# Patient Record
Sex: MALE | Race: WHITE | HISPANIC OR LATINO | Employment: PART TIME | URBAN - METROPOLITAN AREA
[De-identification: names, ages, dates, MRNs, and addresses within clinical notes are randomized per-mention and may not be internally consistent; named-entity substitution may affect disease eponyms.]

---

## 2018-09-30 ENCOUNTER — HOSPITAL ENCOUNTER (INPATIENT)
Facility: HOSPITAL | Age: 43
LOS: 9 days | Discharge: HOME | DRG: 753 | End: 2018-10-09
Attending: PSYCHIATRY & NEUROLOGY | Admitting: PSYCHIATRY & NEUROLOGY
Payer: COMMERCIAL

## 2018-09-30 ENCOUNTER — HOSPITAL ENCOUNTER (EMERGENCY)
Facility: HOSPITAL | Age: 43
End: 2018-09-30
Attending: EMERGENCY MEDICINE
Payer: COMMERCIAL

## 2018-09-30 VITALS
DIASTOLIC BLOOD PRESSURE: 79 MMHG | SYSTOLIC BLOOD PRESSURE: 120 MMHG | HEART RATE: 64 BPM | OXYGEN SATURATION: 98 % | RESPIRATION RATE: 16 BRPM | TEMPERATURE: 97.7 F

## 2018-09-30 DIAGNOSIS — F10.10 ALCOHOL ABUSE, CONTINUOUS: Chronic | ICD-10-CM

## 2018-09-30 DIAGNOSIS — J30.89 SEASONAL ALLERGIC RHINITIS DUE TO OTHER ALLERGIC TRIGGER: ICD-10-CM

## 2018-09-30 DIAGNOSIS — B35.9 TINEA: ICD-10-CM

## 2018-09-30 DIAGNOSIS — Z00.8 MEDICAL CLEARANCE FOR PSYCHIATRIC ADMISSION: Primary | ICD-10-CM

## 2018-09-30 DIAGNOSIS — F29 PSYCHOSIS (HCC): ICD-10-CM

## 2018-09-30 DIAGNOSIS — F12.20 CANNABIS DEPENDENCE, CONTINUOUS (HCC): Chronic | ICD-10-CM

## 2018-09-30 DIAGNOSIS — F31.2 BIPOLAR I DISORDER, MOST RECENT EPISODE MANIC, SEVERE WITH PSYCHOTIC FEATURES (HCC): Primary | Chronic | ICD-10-CM

## 2018-09-30 DIAGNOSIS — Z72.0 TOBACCO USE: ICD-10-CM

## 2018-09-30 LAB
AMPHETAMINES SERPL QL SCN: NEGATIVE
BARBITURATES UR QL: NEGATIVE
BENZODIAZ UR QL: NEGATIVE
COCAINE UR QL: NEGATIVE
ETHANOL EXG-MCNC: 0 MG/DL
METHADONE UR QL: NEGATIVE
OPIATES UR QL SCN: NEGATIVE
PCP UR QL: NEGATIVE
THC UR QL: POSITIVE

## 2018-09-30 PROCEDURE — 82075 ASSAY OF BREATH ETHANOL: CPT | Performed by: EMERGENCY MEDICINE

## 2018-09-30 PROCEDURE — 99285 EMERGENCY DEPT VISIT HI MDM: CPT

## 2018-09-30 PROCEDURE — 80307 DRUG TEST PRSMV CHEM ANLYZR: CPT | Performed by: EMERGENCY MEDICINE

## 2018-09-30 PROCEDURE — 96372 THER/PROPH/DIAG INJ SC/IM: CPT

## 2018-09-30 RX ORDER — HYDROXYZINE HYDROCHLORIDE 25 MG/1
25 TABLET, FILM COATED ORAL EVERY 6 HOURS PRN
Status: DISCONTINUED | OUTPATIENT
Start: 2018-09-30 | End: 2018-10-09 | Stop reason: HOSPADM

## 2018-09-30 RX ORDER — ACETAMINOPHEN 325 MG/1
650 TABLET ORAL EVERY 6 HOURS PRN
Status: CANCELLED | OUTPATIENT
Start: 2018-09-30

## 2018-09-30 RX ORDER — ACETAMINOPHEN 325 MG/1
975 TABLET ORAL EVERY 6 HOURS PRN
Status: CANCELLED | OUTPATIENT
Start: 2018-09-30

## 2018-09-30 RX ORDER — LORAZEPAM 1 MG/1
1 TABLET ORAL EVERY 6 HOURS PRN
Status: CANCELLED | OUTPATIENT
Start: 2018-09-30

## 2018-09-30 RX ORDER — HALOPERIDOL 5 MG/ML
10 INJECTION INTRAMUSCULAR ONCE
Status: COMPLETED | OUTPATIENT
Start: 2018-09-30 | End: 2018-09-30

## 2018-09-30 RX ORDER — BENZTROPINE MESYLATE 0.5 MG/1
1 TABLET ORAL
Status: CANCELLED | OUTPATIENT
Start: 2018-09-30

## 2018-09-30 RX ORDER — HALOPERIDOL 5 MG/ML
5 INJECTION INTRAMUSCULAR EVERY 6 HOURS PRN
Status: CANCELLED | OUTPATIENT
Start: 2018-09-30

## 2018-09-30 RX ORDER — BENZTROPINE MESYLATE 1 MG/ML
1 INJECTION INTRAMUSCULAR; INTRAVENOUS
Status: CANCELLED | OUTPATIENT
Start: 2018-09-30

## 2018-09-30 RX ORDER — TRAZODONE HYDROCHLORIDE 50 MG/1
50 TABLET ORAL
Status: DISCONTINUED | OUTPATIENT
Start: 2018-09-30 | End: 2018-10-09 | Stop reason: HOSPADM

## 2018-09-30 RX ORDER — BENZTROPINE MESYLATE 1 MG/ML
2 INJECTION INTRAMUSCULAR; INTRAVENOUS EVERY 6 HOURS PRN
Status: DISCONTINUED | OUTPATIENT
Start: 2018-09-30 | End: 2018-10-01

## 2018-09-30 RX ORDER — BENZTROPINE MESYLATE 1 MG/1
2 TABLET ORAL EVERY 6 HOURS PRN
Status: DISCONTINUED | OUTPATIENT
Start: 2018-09-30 | End: 2018-10-01

## 2018-09-30 RX ORDER — MAGNESIUM HYDROXIDE/ALUMINUM HYDROXICE/SIMETHICONE 120; 1200; 1200 MG/30ML; MG/30ML; MG/30ML
15 SUSPENSION ORAL EVERY 4 HOURS PRN
Status: DISCONTINUED | OUTPATIENT
Start: 2018-09-30 | End: 2018-10-09 | Stop reason: HOSPADM

## 2018-09-30 RX ORDER — LORAZEPAM 2 MG/ML
INJECTION INTRAMUSCULAR
Status: COMPLETED
Start: 2018-09-30 | End: 2018-09-30

## 2018-09-30 RX ORDER — RISPERIDONE 1 MG/1
1 TABLET, ORALLY DISINTEGRATING ORAL
Status: CANCELLED | OUTPATIENT
Start: 2018-09-30

## 2018-09-30 RX ORDER — ZIPRASIDONE MESYLATE 20 MG/ML
20 INJECTION, POWDER, LYOPHILIZED, FOR SOLUTION INTRAMUSCULAR EVERY 4 HOURS PRN
Status: CANCELLED | OUTPATIENT
Start: 2018-09-30

## 2018-09-30 RX ORDER — MAGNESIUM HYDROXIDE/ALUMINUM HYDROXICE/SIMETHICONE 120; 1200; 1200 MG/30ML; MG/30ML; MG/30ML
30 SUSPENSION ORAL EVERY 4 HOURS PRN
Status: CANCELLED | OUTPATIENT
Start: 2018-09-30

## 2018-09-30 RX ORDER — ACETAMINOPHEN 325 MG/1
650 TABLET ORAL EVERY 4 HOURS PRN
Status: CANCELLED | OUTPATIENT
Start: 2018-09-30

## 2018-09-30 RX ORDER — HALOPERIDOL 5 MG
5 TABLET ORAL EVERY 6 HOURS PRN
Status: CANCELLED | OUTPATIENT
Start: 2018-09-30

## 2018-09-30 RX ORDER — ACETAMINOPHEN 325 MG/1
325 TABLET ORAL EVERY 6 HOURS PRN
Status: DISCONTINUED | OUTPATIENT
Start: 2018-09-30 | End: 2018-10-09 | Stop reason: HOSPADM

## 2018-09-30 RX ADMIN — LORAZEPAM 2 MG: 2 INJECTION INTRAMUSCULAR; INTRAVENOUS at 10:20

## 2018-09-30 RX ADMIN — HALOPERIDOL LACTATE 10 MG: 5 INJECTION, SOLUTION INTRAMUSCULAR at 10:20
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
## 2018-09-30 NOTE — ED NOTES
Insurance Authorization:   Phone call placed to [facility] number: [phone]     Spoke to Judd Chang    3 days approved    Level of care: Inpatient psychiatry  Accepting facility to call and obtain authorization

## 2018-09-30 NOTE — ED NOTES
Patient aware they have been involuntarily committed for inpatient psychiatric treatment  Patient aware of their rights, form provided       302 and Act 77 faxed to [facility] [phone] faxed to Stewart Knowles crisis worker, who will present to [facility]

## 2018-09-30 NOTE — ED NOTES
Patient urinated on self  Nurse, Tech and Security in to clean up and re adjust restraints  Offered food and drink  Changed patient around in fresh scrubs and linens       Lexx Maria  09/30/18 7234

## 2018-09-30 NOTE — EMTALA/ACUTE CARE TRANSFER
[address] [address] [address]  Dept: [phone]      EMTALA TRANSFER CONSENT    NAME Marilyn Route                                         1975                              MRN [id number]    I have been informed of my rights regarding examination, treatment, and transfer   by Dr Live Sales MD    Benefits: Specialized equipment and/or services available at the receiving facility (Include comment)________________________    Risks: Potential for delay in receiving treatment, Potential deterioration of medical condition, Increased discomfort during transfer, Possible worsening of condition or death during transfer      Consent for Transfer:  I acknowledge that my medical condition has been evaluated and explained to me by the emergency department physician or other qualified medical person and/or my attending physician, who has recommended that I be transferred to the service of  Accepting Physician: Dr Paramjit Manning at [address] [place] [address] Name, [facility] : [address]  The above potential benefits of such transfer, the potential risks associated with such transfer, and the probable risks of not being transferred have been explained to me, and I fully understand them  The doctor has explained that, in my case, the benefits of transfer outweigh the risks  I agree to be transferred  I authorize the performance of emergency medical procedures and treatments upon me in both transit and upon arrival at the receiving facility  Additionally, I authorize the release of any and all medical records to the receiving facility and request they be transported with me, if possible  I understand that the safest mode of transportation during a medical emergency is an ambulance and that the Hospital advocates the use of this mode of transport   Risks of traveling to the receiving facility by car, including absence of medical control, life sustaining equipment, such as oxygen, and medical personnel has been explained to me and I fully understand them  (YESSI CORRECT BOX BELOW)  [  ]  I consent to the stated transfer and to be transported by ambulance/helicopter  [  ]  I consent to the stated transfer, but refuse transportation by ambulance and accept full responsibility for my transportation by car  I understand the risks of non-ambulance transfers and I exonerate the Hospital and its staff from any deterioration in my condition that results from this refusal     X___________________________________________    DATE  18  TIME________  Signature of patient or legally responsible individual signing on patient behalf           RELATIONSHIP TO PATIENT_________________________          Provider Certification    NAME Garett Vivas                                         1975                              MRN [id number]    A medical screening exam was performed on the above named patient  Based on the examination:    Condition Necessitating Transfer The primary encounter diagnosis was Medical clearance for psychiatric admission  A diagnosis of Psychosis ([facility] [address] ) was also pertinent to this visit      Patient Condition: The patient has been stabilized such that within reasonable medical probability, no material deterioration of the patient condition or the condition of the unborn child(rey) is likely to result from the transfer    Reason for Transfer: Level of Care needed not available at this facility    Transfer Requirements: [address]   · Space available and qualified personnel available for treatment as acknowledged by    · Agreed to accept transfer and to provide appropriate medical treatment as acknowledged by       Dr Piero Rayo  · Appropriate medical records of the examination and treatment of the patient are provided at the time of transfer   [address] _______  · Transfer will be performed by qualified personnel from and appropriate transfer equipment as required, including the use of necessary and appropriate life support measures  Provider Certification: I have examined the patient and explained the following risks and benefits of being transferred/refusing transfer to the patient/family:  General risk, such as traffic hazards, adverse weather conditions, rough terrain or turbulence, possible failure of equipment (including vehicle or aircraft), or consequences of actions of persons outside the control of the transport personnel      Based on these reasonable risks and benefits to the patient and/or the unborn child(rey), and based upon the information available at the time of the patients examination, I certify that the medical benefits reasonably to be expected from the provision of appropriate medical treatments at another medical facility outweigh the increasing risks, if any, to the individuals medical condition, and in the case of labor to the unborn child, from effecting the transfer      X____________________________________________ DATE 09/30/18        TIME_______      ORIGINAL - SEND TO MEDICAL RECORDS   COPY - SEND WITH PATIENT DURING TRANSFER

## 2018-09-30 NOTE — ED NOTES
Called control team due to patient irate and threat of elopement  Patient placed in 4 point restraints       Candance Breaker A Pasquali  09/30/18 1021

## 2018-09-30 NOTE — ED NOTES
Chief Complaint   Patient presents with   [employer] Psychiatric Evaluation     pt was brought in by ems for inappropriate behavior  per ems police were called multiple times to the residence, he was out in the street saying inappropriate things  per family, pt has a hx of "mental illness" and has been 302 in past; with reoccurrence of symptoms yearly  pt doesn't take previsously prescribed medications  only "self-medicates with marijuana"  his roomate reported agressive behavior and constant burping  brother reports pt has been awake for 4 days     Crisis worker completed Behavioral Health Evaluation and Risk Assessment for 42 y/o male whom presents to ED by [facility] due to being found wondering the street, acting erratic, bizarre, and constantly looking up into the payam  A neighbor called 911 due to patient's behavior  Patient presents agitated, with tangential speech, and appears to be responding to internal stimuli  Patient very paranoid and reports he wants to see his  mother  Patient reports his mom calls his names  Patient denies hallucinations  Patient denies Suicidal ideations and homicidal ideations  Brother reports patient has not slept in four days  Patient reports being involuntarily psychiatrically hospitalized in the past and is self-medicating daily with marijuana  Family reports history of mental illness and that he acts erratic and bizarre this time of year  They  feel he not taking medications and are worried for his safety   Patient denies he needs any treatment and does not wish to sign a 201

## 2018-09-30 NOTE — ED PROVIDER NOTES
68-year-old male brought in by paramedics, needed to be medicated by primary ER provider , I was consult for 302  I saw and examined the patient  , patient very aggressive upon entry to emergency department appeared to be a danger to self and others  patient very tangential, poor to no insight into his illness  , unsure why he is in the emergency department  , seems to be responding to external stimuli , frequently throughout the conversation will keep making references to an ex girlfriend's father and how he owes him money  Patient admits to not taking any medications stating that he does not need them, supplements medications for illegally smoking marijuana  I do recommend patient will require inpatient psychiatric treatment and observation    Therefore will up hold [address],   09/30/18 6483

## 2018-09-30 NOTE — ED NOTES
Patient is accepted at [facility]  Patient is accepted by Dr Audra North per Anne Cuello 32 is arranged with [facility]  Transportation is scheduled for 7:30 pm  Patient may go to the floor at upon arrival        Nurse report is to be called to [phone] prior to patient transfer

## 2018-09-30 NOTE — ED NOTES
Nurse in with security to undue 2 restraints so patient can urinate and stretch  All 4 put back on when finished       Denney Goltz  09/30/18 3309

## 2018-09-30 NOTE — ED NOTES
Pt able to give urine specimen, gave some verbal resistance to being placed back in 4 point locked restraints, but then agreeable  Explained to pt that restraints could begin to be removed if he continued to be calm and cooperative  Pt remains under continuous obs       Shawn Hidalgo RN  09/30/18 1893

## 2018-10-01 PROBLEM — F10.10 ALCOHOL ABUSE, CONTINUOUS: Chronic | Status: ACTIVE | Noted: 2018-10-01

## 2018-10-01 PROBLEM — F31.81 BIPOLAR 2 DISORDER, MAJOR DEPRESSIVE EPISODE (HCC): Chronic | Status: ACTIVE | Noted: 2018-10-01

## 2018-10-01 PROBLEM — F31.2 BIPOLAR I DISORDER, MOST RECENT EPISODE MANIC, SEVERE WITH PSYCHOTIC FEATURES (HCC): Status: ACTIVE | Noted: 2018-10-01

## 2018-10-01 PROBLEM — F12.20 CANNABIS DEPENDENCE, CONTINUOUS (HCC): Chronic | Status: ACTIVE | Noted: 2018-10-01

## 2018-10-01 PROBLEM — F31.2 BIPOLAR I DISORDER, MOST RECENT EPISODE MANIC, SEVERE WITH PSYCHOTIC FEATURES (HCC): Chronic | Status: ACTIVE | Noted: 2018-10-01

## 2018-10-01 PROCEDURE — 99222 1ST HOSP IP/OBS MODERATE 55: CPT | Performed by: PSYCHIATRY & NEUROLOGY

## 2018-10-01 RX ORDER — RISPERIDONE 1 MG/1
2 TABLET, ORALLY DISINTEGRATING ORAL EVERY 4 HOURS PRN
Status: DISCONTINUED | OUTPATIENT
Start: 2018-10-01 | End: 2018-10-09 | Stop reason: HOSPADM

## 2018-10-01 RX ORDER — OLANZAPINE 10 MG/1
10 INJECTION, POWDER, LYOPHILIZED, FOR SOLUTION INTRAMUSCULAR
Status: DISCONTINUED | OUTPATIENT
Start: 2018-10-01 | End: 2018-10-09 | Stop reason: HOSPADM

## 2018-10-01 RX ORDER — LORAZEPAM 2 MG/ML
1 INJECTION INTRAMUSCULAR EVERY 4 HOURS PRN
Status: DISCONTINUED | OUTPATIENT
Start: 2018-10-01 | End: 2018-10-09 | Stop reason: HOSPADM

## 2018-10-01 RX ORDER — ACETAMINOPHEN 325 MG/1
650 TABLET ORAL EVERY 4 HOURS PRN
Status: DISCONTINUED | OUTPATIENT
Start: 2018-10-01 | End: 2018-10-01

## 2018-10-01 RX ORDER — NICOTINE 21 MG/24HR
21 PATCH, TRANSDERMAL 24 HOURS TRANSDERMAL DAILY
Status: DISCONTINUED | OUTPATIENT
Start: 2018-10-01 | End: 2018-10-09 | Stop reason: HOSPADM

## 2018-10-01 RX ORDER — BENZTROPINE MESYLATE 1 MG/1
2 TABLET ORAL EVERY 6 HOURS PRN
Status: DISCONTINUED | OUTPATIENT
Start: 2018-10-01 | End: 2018-10-09 | Stop reason: HOSPADM

## 2018-10-01 RX ORDER — LORAZEPAM 1 MG/1
1 TABLET ORAL 3 TIMES DAILY
Status: COMPLETED | OUTPATIENT
Start: 2018-10-01 | End: 2018-10-02

## 2018-10-01 RX ORDER — ACETAMINOPHEN 325 MG/1
650 TABLET ORAL EVERY 6 HOURS PRN
Status: DISCONTINUED | OUTPATIENT
Start: 2018-10-01 | End: 2018-10-01

## 2018-10-01 RX ORDER — RISPERIDONE 1 MG/1
1 TABLET, ORALLY DISINTEGRATING ORAL 2 TIMES DAILY
Status: DISCONTINUED | OUTPATIENT
Start: 2018-10-01 | End: 2018-10-02

## 2018-10-01 RX ORDER — FOLIC ACID 1 MG/1
1 TABLET ORAL DAILY
Status: DISCONTINUED | OUTPATIENT
Start: 2018-10-01 | End: 2018-10-09 | Stop reason: HOSPADM

## 2018-10-01 RX ORDER — ACETAMINOPHEN 325 MG/1
650 TABLET ORAL EVERY 6 HOURS PRN
Status: DISCONTINUED | OUTPATIENT
Start: 2018-10-01 | End: 2018-10-09 | Stop reason: HOSPADM

## 2018-10-01 RX ORDER — THIAMINE MONONITRATE (VIT B1) 100 MG
100 TABLET ORAL DAILY
Status: DISCONTINUED | OUTPATIENT
Start: 2018-10-01 | End: 2018-10-09 | Stop reason: HOSPADM

## 2018-10-01 RX ORDER — HALOPERIDOL 5 MG
5 TABLET ORAL EVERY 6 HOURS PRN
Status: DISCONTINUED | OUTPATIENT
Start: 2018-10-01 | End: 2018-10-09 | Stop reason: HOSPADM

## 2018-10-01 RX ORDER — HALOPERIDOL 5 MG/ML
5 INJECTION INTRAMUSCULAR EVERY 6 HOURS PRN
Status: DISCONTINUED | OUTPATIENT
Start: 2018-10-01 | End: 2018-10-09 | Stop reason: HOSPADM

## 2018-10-01 RX ORDER — ACETAMINOPHEN 325 MG/1
650 TABLET ORAL EVERY 8 HOURS PRN
Status: DISCONTINUED | OUTPATIENT
Start: 2018-10-01 | End: 2018-10-09 | Stop reason: HOSPADM

## 2018-10-01 RX ORDER — BENZTROPINE MESYLATE 1 MG/ML
2 INJECTION INTRAMUSCULAR; INTRAVENOUS EVERY 6 HOURS PRN
Status: DISCONTINUED | OUTPATIENT
Start: 2018-10-01 | End: 2018-10-09 | Stop reason: HOSPADM

## 2018-10-01 RX ADMIN — THIAMINE HCL TAB 100 MG 100 MG: 100 TAB at 12:22

## 2018-10-01 RX ADMIN — LORAZEPAM 1 MG: 1 TABLET ORAL at 16:43

## 2018-10-01 RX ADMIN — Medication 1 TABLET: at 12:22

## 2018-10-01 RX ADMIN — LORAZEPAM 1 MG: 1 TABLET ORAL at 12:21

## 2018-10-01 RX ADMIN — NICOTINE 21 MG: 21 PATCH, EXTENDED RELEASE TRANSDERMAL at 13:37

## 2018-10-01 RX ADMIN — RISPERIDONE 1 MG: 1 TABLET, ORALLY DISINTEGRATING ORAL at 12:22

## 2018-10-01 RX ADMIN — RISPERIDONE 1 MG: 1 TABLET, ORALLY DISINTEGRATING ORAL at 18:03

## 2018-10-01 RX ADMIN — FOLIC ACID 1 MG: 1 TABLET ORAL at 12:22
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
## 2018-10-01 NOTE — TREATMENT PLAN
TREATMENT PLAN REVIEW - [address] 41 y o  1975 male MRN: [id number]    [address] [place] [address] Room / Bed: [place] [address] [id number] Encounter: [id number]        Admit Date/Time:  9/30/2018  9:20 PM    Treatment Team: Attending Provider: Myrene Fothergill, MD; Consulting Physician: Marvin Landers DO; Occupational Therapy Assistant: Simona Gillis, [address] [place] [address]; Registered Nurse: Loyd Li RN; Patient Care Technician: Ashley Montes De Oca;  : Joseph Moon; Patient Care Technician: Magdaline Heimlich; Registered Nurse: Luis E Andrews RN    Diagnosis: Principal Problem:    Bipolar I disorder, most recent episode manic, severe with psychotic features ([facility] [address] )  Active Problems:    Alcohol abuse, continuous    Cannabis dependence, continuous ([facility] [address] )    Patient Strengths/Assets: negotiates basic needs, stable housing, supportive family      Patient Barriers/Limitations: patient is on an involuntary commitment, poor insight, poor past treatment response, poor reasoning ability, poor self-care, resistance to treatment    Short Term Goals: decrease in manic symptoms, decrease in psychotic symptoms, improvement in reality testing, improvement in reasoning ability, acceptance of need for psychiatric treatment, acceptance of psychiatric medications    Long Term Goals: resolution of manic symptoms, stabilization of mood, resolution of psychotic symptoms, improved reasoning ability, acceptance of need for psychiatric medications    Progress Towards Goals: starting psychiatric medications as prescribed, starting alcohol detoxification protocol    Recommended Treatment: medication management, patient medication education, group therapy, milieu therapy, continued Behavioral Health psychiatric evaluation/assessment process     Treatment Frequency: daily medication monitoring, group and milieu therapy daily, monitoring through interdisciplinary rounds, monitoring through weekly patient care conferences    Expected Discharge Date: 15 days - 10/16/2018    Discharge Plan: referral for outpatient medication management with a psychiatrist, referral for outpatient psychotherapy, return to previous living arrangement    Treatment Plan Created/Updated By: Justine Roberts MD
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
## 2018-10-01 NOTE — PROGRESS NOTES
Pt is out in the milieu  Denies all symptoms  Pt doesn't feel that he needs to be here  Pt is superficial and guarded during interaction  Pt doesn't want to take any medication  States he doesn't even take "Tylenol" when he has a headache  When asked what he does for a headache, pt states takes a "kelsi snack" and laughs
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
## 2018-10-01 NOTE — PROGRESS NOTES
Pt was reluctant to take medications  +Paranoia  Pt questing if the pills really what   I was saying they were  Pt inspected packages and looked at pills  Pt did take the medication  States that this always happens when he ends up in places like this  States that his brothers try to 36 him because they are "jealous" of him

## 2018-10-01 NOTE — PLAN OF CARE
Problem: PSYCHOSIS  Goal: Will report no hallucinations or delusions  Interventions:  - Administer medication as  ordered  - Every waking shifts and PRN assess for the presence of hallucinations and or delusions  - Assist with reality testing to support increasing orientation  - Assess if patient's hallucinations or delusions are encouraging self-harm or harm to others and intervene as appropriate   Outcome: Progressing      Problem: SAFETY, RESTRAINT - VIOLENT/SELF-DESTRUCTIVE  Goal: Remains free of harm/injury from restraints (Restraint for Violent/Self-Destructive Behavior)  INTERVENTIONS:  - Instruct patient/family regarding restraint use   - Assess and monitor physiologic and psychological status   - Provide interventions and comfort measures to meet assessed patient needs   - Ensure continuous in person monitoring is provided   - Identify and implement measures to help patient regain control  - Assess readiness for release of restraint   Outcome: Progressing    Goal: Returns to optimal restraint-free functioning  INTERVENTIONS:  - Assess the patient's behavior and symptoms that indicate continued need for restraint  - Identify and implement measures to help patient regain control  - Assess readiness for release of restraint    Outcome: Progressing
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
## 2018-10-02 LAB
ALBUMIN SERPL BCP-MCNC: 4.2 G/DL (ref 3.5–5)
ALP SERPL-CCNC: 75 U/L (ref 46–116)
ALT SERPL W P-5'-P-CCNC: 35 U/L (ref 12–78)
ANION GAP SERPL CALCULATED.3IONS-SCNC: 7 MMOL/L (ref 4–13)
AST SERPL W P-5'-P-CCNC: 39 U/L (ref 5–45)
BASOPHILS # BLD AUTO: 0.04 THOUSANDS/ΜL (ref 0–0.1)
BASOPHILS NFR BLD AUTO: 0 % (ref 0–1)
BILIRUB SERPL-MCNC: 1.16 MG/DL (ref 0.2–1)
BUN SERPL-MCNC: 11 MG/DL (ref 5–25)
CALCIUM SERPL-MCNC: 9.2 MG/DL (ref 8.3–10.1)
CHLORIDE SERPL-SCNC: 105 MMOL/L (ref 100–108)
CHOLEST SERPL-MCNC: 117 MG/DL (ref 50–200)
CO2 SERPL-SCNC: 28 MMOL/L (ref 21–32)
CREAT SERPL-MCNC: 0.85 MG/DL (ref 0.6–1.3)
EOSINOPHIL # BLD AUTO: 0.19 THOUSAND/ΜL (ref 0–0.61)
EOSINOPHIL NFR BLD AUTO: 2 % (ref 0–6)
ERYTHROCYTE [DISTWIDTH] IN BLOOD BY AUTOMATED COUNT: 13.1 % (ref 11.6–15.1)
GFR SERPL CREATININE-BSD FRML MDRD: 108 ML/MIN/1.73SQ M
GLUCOSE P FAST SERPL-MCNC: 97 MG/DL (ref 65–99)
GLUCOSE SERPL-MCNC: 97 MG/DL (ref 65–140)
HCT VFR BLD AUTO: 51 % (ref 36.5–49.3)
HDLC SERPL-MCNC: 43 MG/DL (ref 40–60)
HGB BLD-MCNC: 17.1 G/DL (ref 12–17)
IMM GRANULOCYTES # BLD AUTO: 0.03 THOUSAND/UL (ref 0–0.2)
IMM GRANULOCYTES NFR BLD AUTO: 0 % (ref 0–2)
LDLC SERPL CALC-MCNC: 61 MG/DL (ref 0–100)
LYMPHOCYTES # BLD AUTO: 3.44 THOUSANDS/ΜL (ref 0.6–4.47)
LYMPHOCYTES NFR BLD AUTO: 28 % (ref 14–44)
MCH RBC QN AUTO: 31.7 PG (ref 26.8–34.3)
MCHC RBC AUTO-ENTMCNC: 33.5 G/DL (ref 31.4–37.4)
MCV RBC AUTO: 95 FL (ref 82–98)
MONOCYTES # BLD AUTO: 1.09 THOUSAND/ΜL (ref 0.17–1.22)
MONOCYTES NFR BLD AUTO: 9 % (ref 4–12)
NEUTROPHILS # BLD AUTO: 7.72 THOUSANDS/ΜL (ref 1.85–7.62)
NEUTS SEG NFR BLD AUTO: 61 % (ref 43–75)
NONHDLC SERPL-MCNC: 74 MG/DL
NRBC BLD AUTO-RTO: 0 /100 WBCS
PLATELET # BLD AUTO: 295 THOUSANDS/UL (ref 149–390)
PMV BLD AUTO: 9.9 FL (ref 8.9–12.7)
POTASSIUM SERPL-SCNC: 4.2 MMOL/L (ref 3.5–5.3)
PROT SERPL-MCNC: 7.9 G/DL (ref 6.4–8.2)
RBC # BLD AUTO: 5.39 MILLION/UL (ref 3.88–5.62)
RPR SER QL: NORMAL
SODIUM SERPL-SCNC: 140 MMOL/L (ref 136–145)
TRIGL SERPL-MCNC: 63 MG/DL
TSH SERPL DL<=0.05 MIU/L-ACNC: 1.45 UIU/ML (ref 0.36–3.74)
WBC # BLD AUTO: 12.51 THOUSAND/UL (ref 4.31–10.16)

## 2018-10-02 PROCEDURE — 84443 ASSAY THYROID STIM HORMONE: CPT | Performed by: PSYCHIATRY & NEUROLOGY

## 2018-10-02 PROCEDURE — 86592 SYPHILIS TEST NON-TREP QUAL: CPT | Performed by: PSYCHIATRY & NEUROLOGY

## 2018-10-02 PROCEDURE — 99232 SBSQ HOSP IP/OBS MODERATE 35: CPT | Performed by: PSYCHIATRY & NEUROLOGY

## 2018-10-02 PROCEDURE — 80053 COMPREHEN METABOLIC PANEL: CPT | Performed by: PSYCHIATRY & NEUROLOGY

## 2018-10-02 PROCEDURE — 85025 COMPLETE CBC W/AUTO DIFF WBC: CPT | Performed by: PSYCHIATRY & NEUROLOGY

## 2018-10-02 PROCEDURE — 80061 LIPID PANEL: CPT | Performed by: PSYCHIATRY & NEUROLOGY

## 2018-10-02 RX ORDER — RISPERIDONE 1 MG/1
1 TABLET, ORALLY DISINTEGRATING ORAL DAILY
Status: DISCONTINUED | OUTPATIENT
Start: 2018-10-03 | End: 2018-10-04

## 2018-10-02 RX ORDER — LORAZEPAM 0.5 MG/1
0.5 TABLET ORAL 3 TIMES DAILY
Status: COMPLETED | OUTPATIENT
Start: 2018-10-02 | End: 2018-10-03

## 2018-10-02 RX ORDER — CARBAMAZEPINE 200 MG/1
200 TABLET ORAL 2 TIMES DAILY
Status: DISCONTINUED | OUTPATIENT
Start: 2018-10-02 | End: 2018-10-05

## 2018-10-02 RX ORDER — RISPERIDONE 1 MG/1
2 TABLET, ORALLY DISINTEGRATING ORAL EVERY EVENING
Status: DISCONTINUED | OUTPATIENT
Start: 2018-10-02 | End: 2018-10-09 | Stop reason: HOSPADM

## 2018-10-02 RX ADMIN — LORAZEPAM 1 MG: 1 TABLET ORAL at 17:06

## 2018-10-02 RX ADMIN — NICOTINE 21 MG: 21 PATCH, EXTENDED RELEASE TRANSDERMAL at 08:22

## 2018-10-02 RX ADMIN — LORAZEPAM 1 MG: 1 TABLET ORAL at 00:08

## 2018-10-02 RX ADMIN — THIAMINE HCL TAB 100 MG 100 MG: 100 TAB at 08:22

## 2018-10-02 RX ADMIN — RISPERIDONE 1 MG: 1 TABLET, ORALLY DISINTEGRATING ORAL at 08:22

## 2018-10-02 RX ADMIN — RISPERIDONE 2 MG: 1 TABLET, ORALLY DISINTEGRATING ORAL at 17:06

## 2018-10-02 RX ADMIN — FOLIC ACID 1 MG: 1 TABLET ORAL at 08:22

## 2018-10-02 RX ADMIN — Medication 1 TABLET: at 08:22

## 2018-10-02 RX ADMIN — LORAZEPAM 1 MG: 1 TABLET ORAL at 08:22

## 2018-10-02 RX ADMIN — CARBAMAZEPINE 200 MG: 200 TABLET ORAL at 12:33

## 2018-10-02 RX ADMIN — CARBAMAZEPINE 200 MG: 200 TABLET ORAL at 17:06

## 2018-10-02 RX ADMIN — LORAZEPAM 0.5 MG: 0.5 TABLET ORAL at 22:18

## 2018-10-02 NOTE — PLAN OF CARE
Problem: PSYCHOSIS  Goal: Will report no hallucinations or delusions  Interventions:  - Administer medication as  ordered  - Every waking shifts and PRN assess for the presence of hallucinations and or delusions  - Assist with reality testing to support increasing orientation  - Assess if patient's hallucinations or delusions are encouraging self-harm or harm to others and intervene as appropriate   Outcome: Progressing      Problem: SAFETY, RESTRAINT - VIOLENT/SELF-DESTRUCTIVE  Goal: Remains free of harm/injury from restraints (Restraint for Violent/Self-Destructive Behavior)  INTERVENTIONS:  - Instruct patient/family regarding restraint use   - Assess and monitor physiologic and psychological status   - Provide interventions and comfort measures to meet assessed patient needs   - Ensure continuous in person monitoring is provided   - Identify and implement measures to help patient regain control  - Assess readiness for release of restraint   Outcome: Progressing    Goal: Returns to optimal restraint-free functioning  INTERVENTIONS:  - Assess the patient's behavior and symptoms that indicate continued need for restraint  - Identify and implement measures to help patient regain control  - Assess readiness for release of restraint    Outcome: Progressing      Problem: DISCHARGE PLANNING  Goal: Discharge to home or other facility with appropriate resources  INTERVENTIONS:  - Identify barriers to discharge w/patient and caregiver  - Arrange for needed discharge resources and transportation as appropriate  - Identify discharge learning needs (meds, wound care, etc )  - Refer to Case Management Department for coordinating discharge planning if the patient needs post-hospital services based on physician/advanced practitioner order or complex needs related to functional status, cognitive ability, or social support system    INTERVENTIONS:  - Identify barriers to discharge w/patient and caregiver  - Arrange for needed discharge resources and transportation as appropriate  - Identify discharge learning needs (meds, wound care, etc )  - Refer to Case Management Department for coordinating discharge planning if the patient needs post-hospital services based on physician/advanced practitioner order or complex needs related to functional status, cognitive ability, or social support system   Outcome: Progressing      Problem: Ineffective Coping  Goal: Participates in unit activities  Interventions:  - Provide therapeutic environment   - Provide required programming   - Redirect inappropriate behaviors    Outcome: Progressing
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
## 2018-10-02 NOTE — CASE MANAGEMENT
ASHU called Lamin [place] [facility], re: need for 303 hearing  Told ASHU to fax him the 303 petition, which ASHU did  He said hearing would be on 104  He'll call ASHU & advise of the hearing time

## 2018-10-02 NOTE — PROGRESS NOTES
Pt denies SI or any other symptoms  He believes it was a misunderstanding that brought him to the hospital  He denies any alcohol withdrawal symptoms  The patient has been very preoccupied and almost appeared paranoid regarding medication  He stated to RN, "I don't really like taking any meds but I will to get outta here " Pt also examined each individual med wrapper closely  When RN went to open packages, he stated "Wait, you're going too fast " Pt did take medications  He has been observed since in the hallway doing jumping jacks, pacing the halls and doing yoga  Will monitor

## 2018-10-02 NOTE — PROGRESS NOTES
Patient pacing back and forth in the hallway  Pt given prn ativan at 0010 and standing underneath lighting dissecting the pill  Pt inspected packages and read before taking pill  Will continue monitor

## 2018-10-02 NOTE — PROGRESS NOTES
Progress Note - Behavioral Health     Yusra Kennedy 41 y o  male MRN: [id number]   Unit/Bed#: UN5 561-98 Encounter: [id number]    Behavior over the last 24 hours: limited improvement  Thiago Patterson remains irritable, labile and psychotic  He is still religiously preoccupied, his speech remains pressured and tangential  Per staff report he has been very paranoid about his medications, "examining" and opening pill packages  Bizarre behavior observed today after the session as well: he was pacing the hallways, exercising, also still "burping" at times and talking to himself  Has been taking medications, but believes he does not need any treatment  Attends some groups      Sleep: slept off and on  Appetite: normal  Medication side effects: No   ROS: no complaints, denies any shortness of breath, chest pain or abdominal pain    Mental Status Evaluation:    Appearance:  disheveled   Behavior:  bizarre, limited eye contact, evasive   Speech:  pressured, tangential   Mood:  labile, irritable, manic   Affect:  labile   Thought Process:  disorganized, illogical   Associations: tangential associations   Thought Content:  persecutory and bizarre delusions   Perceptual Disturbances: denies auditory or visual hallucinations when asked, but appears distracted   Risk Potential: Suicidal ideation - None at present  Homicidal ideation - None  Potential for aggression - Yes, due to poor impulse control   Sensorium:  oriented to person, place and time/date   Memory:  recent and remote memory grossly intact   Consciousness:  alert and awake   Attention: poor concentration and poor attention span   Insight:  poor   Judgment: poor   Gait/Station: normal gait/station and normal balance   Motor Activity: no abnormal movements     Vital signs in last 24 hours:    Temp:  [97 6 °F (36 4 °C)] 97 6 °F (36 4 °C)  HR:  [62-97] 97  Resp:  [16] 16  BP: (142-171)/(81-86) 171/81    Laboratory results:  I have personally reviewed all pertinent laboratory/tests results  Most Recent Labs:   Lab Results   Component Value Date    WBC 12 51 (H) 10/02/2018    RBC 5 39 10/02/2018    HGB 17 1 (H) 10/02/2018    HCT 51 0 (H) 10/02/2018     10/02/2018    RDW 13 1 10/02/2018    NEUTROABS 7 72 (H) 10/02/2018     10/02/2018    K 4 2 10/02/2018     10/02/2018    CO2 28 10/02/2018    BUN 11 10/02/2018    CREATININE 0 85 10/02/2018    GLUC 97 10/02/2018    GLUF 97 10/02/2018    CALCIUM 9 2 10/02/2018    AST 39 10/02/2018    ALT 35 10/02/2018    ALKPHOS 75 10/02/2018    TP 7 9 10/02/2018    ALB 4 2 10/02/2018    TBILI 1 16 (H) 10/02/2018    CHOLESTEROL 117 10/02/2018    HDL 43 10/02/2018    TRIG 63 10/02/2018    LDLCALC 61 10/02/2018    [place] 74 10/02/2018    DXS7ANMNSSXD 1 450 10/02/2018       Progress Toward Goals: no significant improvement, still disorganized, still irritable, remains labile, insight remains poor, poor reality testing    Assessment/Plan   Principal Problem:    Bipolar I disorder, most recent episode manic, severe with psychotic features ([facility] )  Active Problems:    Alcohol abuse, continuous    Cannabis dependence, continuous ([facility] )    Recommended Treatment:     Planned medication and treatment changes: All current active medications have been reviewed  Encourage group therapy, milieu therapy and occupational therapy  Behavioral Health checks every 5 minutes  303 hearing this week    Increase Risperdal to 1 mg daily and 2 mg in the evening  Decrease Ativan to 0 5 mg tid and taper off gradually  Start Tegretol 200 mg bid for mood stabilization - he agrees now to try  Check Tegretol level, CBC and CMP in 3 days     Continue all other medications:    Current Facility-Administered Medications:  acetaminophen 325 mg Oral Q6H PRN Mayela Hand MD   acetaminophen 650 mg Oral Q8H PRN Eber Sebastian MD   acetaminophen 650 mg Oral Q6H PRN Eber Sebastian MD   aluminum-magnesium hydroxide-simethicone 15 mL Oral Q4H PRN Mark Morley Mouna Benitez MD   benztropine 2 mg Intramuscular Q6H PRN Sammy Fails, MD   benztropine 2 mg Oral Q6H PRN Sammy Fails, MD   carBAMazepine 200 mg Oral BID Sammy Fails, MD   folic acid 1 mg Oral Daily Sammy Fails, MD   haloperidol 5 mg Oral Q6H PRN Sammy Fails, MD   haloperidol lactate 5 mg Intramuscular Q6H PRN Sammy Fails, MD   hydrOXYzine HCL 25 mg Oral Q6H PRN Wil Broussard, MD   LORazepam 1 mg Intramuscular Q4H PRN Sammy Fails, MD   LORazepam 0 5 mg Oral TID Sammy Fails, MD   LORazepam 1 mg Oral TID Sammy Fails, MD   magnesium hydroxide 20 mL Oral Daily PRN Wil Broussard, MD   multivitamin-minerals 1 tablet Oral Daily Sammy Fails, MD   nicotine 21 mg Transdermal Daily Sammy Fails, MD   OLANZapine 10 mg Intramuscular Q3H PRN Sammy Fails, MD   [START ON 10/3/2018] risperiDONE 1 mg Oral Daily Sammy Fails, MD   risperiDONE 2 mg Oral Q4H PRN Sammy Fails, MD   risperiDONE 2 mg Oral QPM Sammy Fails, MD   thiamine 100 mg Oral Daily Sammy Fails, MD   traZODone 50 mg Oral HS PRN Wil Broussard MD       Risks / Benefits of Treatment:    Risks, benefits, and possible side effects of medications explained to patient  Patient has limited understanding of risks and benefits of treatment at this time, but agrees to take medications as prescribed  Counseling / Coordination of Care:    Patient's progress discussed with staff in treatment team meeting  Medications, treatment progress and treatment plan reviewed with patient  Medication changes discussed with patient      Lauren Livingston MD 10/02/18

## 2018-10-02 NOTE — PROGRESS NOTES
Pt was very apprehensive about taking the newly ordered Tegretol  RN explained the medication and what it was used for  Pt also requested to see the package of the medication again  He said, "Just because I am asking to see the packages doesn't mean anything  Wouldn't you want to know what you were taking?" Pt requested and will be given information on all the medications

## 2018-10-03 PROCEDURE — 99232 SBSQ HOSP IP/OBS MODERATE 35: CPT | Performed by: PSYCHIATRY & NEUROLOGY

## 2018-10-03 RX ORDER — LORAZEPAM 0.5 MG/1
0.5 TABLET ORAL 2 TIMES DAILY
Status: COMPLETED | OUTPATIENT
Start: 2018-10-04 | End: 2018-10-05

## 2018-10-03 RX ADMIN — RISPERIDONE 2 MG: 1 TABLET, ORALLY DISINTEGRATING ORAL at 17:42

## 2018-10-03 RX ADMIN — NICOTINE 21 MG: 21 PATCH, EXTENDED RELEASE TRANSDERMAL at 09:00

## 2018-10-03 RX ADMIN — Medication 1 TABLET: at 08:53

## 2018-10-03 RX ADMIN — THIAMINE HCL TAB 100 MG 100 MG: 100 TAB at 08:54

## 2018-10-03 RX ADMIN — LORAZEPAM 0.5 MG: 0.5 TABLET ORAL at 08:54

## 2018-10-03 RX ADMIN — FOLIC ACID 1 MG: 1 TABLET ORAL at 08:54

## 2018-10-03 RX ADMIN — LORAZEPAM 0.5 MG: 0.5 TABLET ORAL at 17:43

## 2018-10-03 RX ADMIN — LORAZEPAM 0.5 MG: 0.5 TABLET ORAL at 21:45

## 2018-10-03 RX ADMIN — CARBAMAZEPINE 200 MG: 200 TABLET ORAL at 08:54

## 2018-10-03 RX ADMIN — CARBAMAZEPINE 200 MG: 200 TABLET ORAL at 17:42

## 2018-10-03 RX ADMIN — RISPERIDONE 1 MG: 1 TABLET, ORALLY DISINTEGRATING ORAL at 08:54

## 2018-10-03 NOTE — PLAN OF CARE
Problem: PSYCHOSIS  Goal: Will report no hallucinations or delusions  Interventions:  - Administer medication as  ordered  - Every waking shifts and PRN assess for the presence of hallucinations and or delusions  - Assist with reality testing to support increasing orientation  - Assess if patient's hallucinations or delusions are encouraging self-harm or harm to others and intervene as appropriate   Outcome: Progressing      Problem: SAFETY, RESTRAINT - VIOLENT/SELF-DESTRUCTIVE  Goal: Remains free of harm/injury from restraints (Restraint for Violent/Self-Destructive Behavior)  INTERVENTIONS:  - Instruct patient/family regarding restraint use   - Assess and monitor physiologic and psychological status   - Provide interventions and comfort measures to meet assessed patient needs   - Ensure continuous in person monitoring is provided   - Identify and implement measures to help patient regain control  - Assess readiness for release of restraint   Outcome: Progressing    Goal: Returns to optimal restraint-free functioning  INTERVENTIONS:  - Assess the patient's behavior and symptoms that indicate continued need for restraint  - Identify and implement measures to help patient regain control  - Assess readiness for release of restraint    Outcome: Progressing      Problem: DISCHARGE PLANNING  Goal: Discharge to home or other facility with appropriate resources  INTERVENTIONS:  - Identify barriers to discharge w/patient and caregiver  - Arrange for needed discharge resources and transportation as appropriate  - Identify discharge learning needs (meds, wound care, etc )  - Refer to Case Management Department for coordinating discharge planning if the patient needs post-hospital services based on physician/advanced practitioner order or complex needs related to functional status, cognitive ability, or social support system    INTERVENTIONS:  - Identify barriers to discharge w/patient and caregiver  - Arrange for needed discharge resources and transportation as appropriate  - Identify discharge learning needs (meds, wound care, etc )  - Refer to Case Management Department for coordinating discharge planning if the patient needs post-hospital services based on physician/advanced practitioner order or complex needs related to functional status, cognitive ability, or social support system   Outcome: Not Progressing      Problem: Ineffective Coping  Goal: Participates in unit activities  Interventions:  - Provide therapeutic environment   - Provide required programming   - Redirect inappropriate behaviors    Outcome: Progressing

## 2018-10-03 NOTE — MEDICAL STUDENT
Progress Note - Behavioral Health   Gianna Vasquez 41 y o  male MRN: [id number]  Unit/Bed#: BZ3 502-03 Encounter: [id number]    Assessment/Plan   Principal Problem:    Bipolar I disorder, most recent episode manic, severe with psychotic features ([facility] )  Active Problems:    Alcohol abuse, continuous    Cannabis dependence, continuous ([facility] )    Plan:  1  Bipolar Disorder Type I, with psychotic features  -Patient is still manic on evaluation today and having paranoid delusions  -Continue risperidone at 1mg in the mornings and 2 mg evenings  -Continue carbamazepine 200 mg BID  -Encourage group therapy and milieu therapy    2  Alcohol abuse  -Last drink 4 days ago; patient has exhibited no withdrawal symptoms  -Continue Lorazepam 0 5 mg TID  -Continue folic acid, thiamine and vitamin supplements    Subjective:  Gisel Green reports no concerns today except repeatedly saying that he doesn't need to be here  He is very focused on discharge throughout the entire evaluation  He repeatedly describes his positive interactions with patients and how he helps out by calming them down  He also stated that he cleaned the group room when he woke up this morning at 4:45 am  It seems he may be trying to show us that he is ready for discharge through his behaviors  He also showed me the discharge page from his handbook and was asking me questions about it  He is taking his medications as scheduled and reports no adverse effects  He states that he believes the medications are helping him and that he is feeling better  He is still displaying paranoid behavior about his medication regimen, wanting to read the packages and verify that he is getting the correct dose and pill  He also has handouts on each of the medications he is receiving   I explained to Gisel Green the difference between the generic and the brand name of the medicine as he seemed to be confused about what he was taking since his handouts use the generic name but others have used the brand name when talking to him  Ralf Gauthier is additionally paranoid about people stealing from him  He states that he had his mother's phone number in a book he keeps on his nightstand and that this piece of paper is now missing  He also claims he was admitted with a "gold coin that had the statue of ralf on the front and a man who looks like Angeli Christy on the back" which has since gone missing and wasn't with his belongings when he got here  Behavior over the last 24 hours:  unchanged  Sleep: Slept from 6 pm to 11 am last night and then got up to take his medications and went back to bed until 4:45 am   Appetite: normal  Medication side effects: No  ROS: no complaints    Mental Status Evaluation:  Appearance:  age appropriate and casually dressed   Behavior:  cooperative, calmer but still irritable   Speech:  normal pitch and normal volume   Mood:  manic   Affect:  mood-congruent   Thought Process:  tangential   Thought Content:  delusions  paranoid   Perceptual Disturbances: None   Risk Potential: Suicidal Ideations none, Homicidal Ideations none and Potential for Aggression Yes patient is still manic and irritable but appears to get along with residents and staff   Sensorium:  person, place and time/date   Cognition:  grossly intact   Consciousness:  alert and awake    Attention: attention span and concentration were age appropriate   Insight:  limited   Judgment: limited   Gait/Station: normal gait/station and normal balance   Motor Activity: no abnormal movements     Progress Toward Goals: Patient continues to make no significant progress towards his goals due to poor insight into his condition  Recommended Treatment: Continue with group therapy, milieu therapy and occupational therapy  Risks, benefits and possible side effects of Medications:   Risks, benefits, and possible side effects of medications explained to patient and patient verbalizes understanding        Medications: all current active meds have been reviewed

## 2018-10-03 NOTE — PROGRESS NOTES
Patient denies all psychiatric symptoms but appears paranoid in our interaction  I scanned his morning medications and threw the packages away  Patient stated, "I would like to see the packages "  But they were already garbage  I took them out of accu-dose again and showed him the packages as I gave them  I also gave him hand-outs on Risperdal and Tegretol

## 2018-10-03 NOTE — PROGRESS NOTES
Progress Note - Behavioral Health     Marion Lee 41 y o  male MRN: [id number]   Unit/Bed#: TI0 319-21 Encounter: [id number]    Behavior over the last 24 hours: eliana Elizondo is still irritable and labile, still has rambling, pressured speech with flight of ideas  Remains paranoid and still preoccupied with medication names; still has been "inspecting" medication packages "I need to know what they give me"  Still has Congregational preoccupation and talks about God frequently during the session  Grandaurea, has been "telling other patients what to do, trying to calm them when they get upset  I have a biggest heart that exists"  States that he is proud that he woke up at 4 AM and was "cleaning the whole unit"  Bizarre behavior noted at times, he still makes loud burping noises, talks to self and sings to self frequently  Has been taking medications on the unit, but argues titration schedule and need for continuing medications  Attends some groups       Sleep: slept off and on, early awakening  Appetite: normal  Medication side effects: No   ROS: no complaints, denies any shortness of breath, chest pain or abdominal pain    Mental Status Evaluation:    Appearance:  disheveled, wearing hospital clothes   Behavior:  bizarre, evasive   Speech:  pressured, tangential   Mood:  labile, manic   Affect:  labile   Thought Process:  disorganized, illogical   Associations: tangential associations   Thought Content:  grandiose, paranoid and bizarre delusions, Congregational preoccupation   Perceptual Disturbances: denies auditory or visual hallucinations when asked, but appears preoccupied   Risk Potential: Suicidal ideation - None  Homicidal ideation - None  Potential for aggression - Not at present   Sensorium:  oriented to person, place and time/date   Memory:  recent and remote memory grossly intact   Consciousness:  alert and awake   Attention: poor concentration and poor attention span   Insight:  poor   Judgment: poor Gait/Station: normal gait/station and normal balance   Motor Activity: no abnormal movements     Vital signs in last 24 hours:    Temp:  [98 °F (36 7 °C)-98 2 °F (36 8 °C)] 98 2 °F (36 8 °C)  HR:  [] 87  Resp:  [16] 16  BP: (135-171)/(87-98) 140/98    Laboratory results:  I have personally reviewed all pertinent laboratory/tests results  RPR:   Lab Results   Component Value Date    RPR Non-Reactive 10/02/2018       Progress Toward Goals: no progress, remains disorganized, still labile, poor insight, still has psychotic symptoms    Assessment/Plan   Principal Problem:    Bipolar I disorder, most recent episode manic, severe with psychotic features ([facility] )  Active Problems:    Alcohol abuse, continuous    Cannabis dependence, continuous ([facility] )    Recommended Treatment:     Planned medication and treatment changes: All current active medications have been reviewed  Encourage group therapy, milieu therapy and occupational therapy  Behavioral Health checks every 5 minutes  303 hearing scheduled for tomorrow    Check Tegretol level, CBC and CMP in 2 days  Taper off ativan gradually  Titrate Risperdal    Continue all other medications:    Current Facility-Administered Medications:  acetaminophen 325 mg Oral Q6H PRN Bebeto Yanes MD   acetaminophen 650 mg Oral Q8H PRN Martin Berumen MD   acetaminophen 650 mg Oral Q6H PRN Mercarlos enriquel MD [place]   aluminum-magnesium hydroxide-simethicone 15 mL Oral Q4H PRN Bebeto Yanes MD   benztropine 2 mg Intramuscular Q6H PRN Austin Smith MD   benztropine 2 mg Oral Q6H PRN Austin Smith MD   carBAMazepine 200 mg Oral BID Austin Smith MD   folic acid 1 mg Oral Daily Austin Smith MD   haloperidol 5 mg Oral Q6H PRN Austin Smith MD   haloperidol lactate 5 mg Intramuscular Q6H PRN Austin Smith MD   hydrOXYzine HCL 25 mg Oral Q6H PRN Bebeto Yanes MD   LORazepam 1 mg Intramuscular Q4H PRN Austin Smith MD   LORazepam 0 5 mg Oral TID Lynnette Soliman MD   [START ON 10/4/2018] LORazepam 0 5 mg Oral BID Lynnette Soliman MD   magnesium hydroxide 20 mL Oral Daily PRN Rodríguez Johnson MD   multivitamin-minerals 1 tablet Oral Daily Lynnette Soliman MD   nicotine 21 mg Transdermal Daily Lynnette Soliman MD   OLANZapine 10 mg Intramuscular Q3H PRN Lynnette Soliman MD   risperiDONE 1 mg Oral Daily Lynnette Soliman MD   risperiDONE 2 mg Oral Q4H PRN Lynnette Soliman MD   risperiDONE 2 mg Oral QPM Lynnette Soliman MD   thiamine 100 mg Oral Daily Lynnette Soliman MD   traZODone 50 mg Oral HS PRN Rodríguez Johnson MD       Risks / Benefits of Treatment:    Risks, benefits, and possible side effects of medications explained to patient  Patient has limited understanding of risks and benefits of treatment at this time, but agrees to take medications as prescribed  Counseling / Coordination of Care:    Patient's progress discussed with staff in treatment team meeting  Medications, treatment progress and treatment plan reviewed with patient  Medication changes discussed with patient  Educated on importance of medication and treatment compliance      Yanet Houston MD 10/03/18

## 2018-10-03 NOTE — PROGRESS NOTES
It was reported to me that patient was counseling J F  about what things she should do when she goes home  I explained to patient that he should only concentrate on himself and do not  other patients  Patient denied saying anything about medications only that she should get a job not matter if it is minimum wage  Patient verbalized understanding of what I meant

## 2018-10-03 NOTE — CASE MANAGEMENT
ASHU called Piedad Meigs, county MH, re: time for 303 hearing tomorrow  Terekalen Pelayopooja said hearing would be @ 9 AM  ASHU met with pt  Signed tx plan & ROIs for Olene Blizzard, cousin, [phone] (said she lived in [facility]); [facility], [facility], & [facility] for out pt follow-up  Pt lives with a roommate  Layo Florencia he was employed as a   Currently not involved in out pt tx  Had 2 previous admits (in 2009 & "yrs ago")  Pt tangential  Talked about past issues with brother & his mother  Has a cousin, who is supportive  C/O the dr wanting to increase his meds  Pt said he felt good with the meds he was currently taking  SW spoke to pt re: his marijuana use  Seems pt self-medicates  SW said he did not need that, since he was now on prescribed meds & that drug use was illegal  Pt has hx of incarcerations, which he talked about  Pt agreed with referral for D&A follow-up  ASHU reviewed 303 rts & copy given to pt  Pt hopeful for D/C soon  Agreed with  follow-up  Layo Don he wanted to talk to his  prior to 303 hearing tomorrow  Pt cooperative with SW  Very talkative  Pleasant  Polite  Good eye contact  Said he was putting everything in "God's hands"  Met with pt for over 1 hr  NOTE: NO burping was noted by pt while ASHU was mtg with him

## 2018-10-03 NOTE — CASE MANAGEMENT
Email sent to Luisito Thomas MD to inform of 303 hearing to be held on 10/4/18 at 9am on [address]  The assigned  did attempt to reach this physician by phone yesterday, however, was unable to connect with him

## 2018-10-04 PROCEDURE — 87591 N.GONORRHOEAE DNA AMP PROB: CPT | Performed by: PSYCHIATRY & NEUROLOGY

## 2018-10-04 PROCEDURE — 99232 SBSQ HOSP IP/OBS MODERATE 35: CPT | Performed by: PSYCHIATRY & NEUROLOGY

## 2018-10-04 PROCEDURE — 87491 CHLMYD TRACH DNA AMP PROBE: CPT | Performed by: PSYCHIATRY & NEUROLOGY

## 2018-10-04 RX ORDER — RISPERIDONE 1 MG/1
2 TABLET, ORALLY DISINTEGRATING ORAL DAILY
Status: DISCONTINUED | OUTPATIENT
Start: 2018-10-05 | End: 2018-10-09 | Stop reason: HOSPADM

## 2018-10-04 RX ADMIN — CARBAMAZEPINE 200 MG: 200 TABLET ORAL at 17:53

## 2018-10-04 RX ADMIN — Medication 1 TABLET: at 08:43

## 2018-10-04 RX ADMIN — FOLIC ACID 1 MG: 1 TABLET ORAL at 08:44

## 2018-10-04 RX ADMIN — RISPERIDONE 1 MG: 1 TABLET, ORALLY DISINTEGRATING ORAL at 08:44

## 2018-10-04 RX ADMIN — THIAMINE HCL TAB 100 MG 100 MG: 100 TAB at 08:43

## 2018-10-04 RX ADMIN — NICOTINE 21 MG: 21 PATCH, EXTENDED RELEASE TRANSDERMAL at 08:45

## 2018-10-04 RX ADMIN — LORAZEPAM 0.5 MG: 0.5 TABLET ORAL at 17:53

## 2018-10-04 RX ADMIN — CARBAMAZEPINE 200 MG: 200 TABLET ORAL at 08:44

## 2018-10-04 RX ADMIN — RISPERIDONE 2 MG: 1 TABLET, ORALLY DISINTEGRATING ORAL at 17:53

## 2018-10-04 RX ADMIN — LORAZEPAM 0.5 MG: 0.5 TABLET ORAL at 08:49

## 2018-10-04 NOTE — CASE MANAGEMENT
ASHU rec'd call @ 3:15 PM, from [employer], [facility] crisis, sating that the PD, [employer] [employer], who represented the pt @ this morning's 303 hearing, filed an appeal  The hearing is scheduled on Tues, 10/9 @ 11 Brianna Arellano said that he'd come to the hospital 1/2 hr earlier to set up the video link to court room # 4  Said pt's  would need to testify

## 2018-10-04 NOTE — PROGRESS NOTES
Pt denies all symptoms  He was calm, with an irritable edge  Pt has been cooperative and was hopeful to be d/c today after 303 hearing  Pt has been in behavioral control

## 2018-10-04 NOTE — PROGRESS NOTES
Progress Note - Behavioral Health     Ivania Huynh 41 y o  male MRN: [id number]   Unit/Bed#: WX8 468-29 Encounter: [id number]    Behavior over the last 24 hours: limited improvement  Jessi Baldwin remains religiously preoccupied, hypertalkative and manic  He still has excessive energy, woke up at 4 AM to work out on the unit and "read my Bible"  Remains very grandiose "I am here to love everybody  God gives me 2 hands and 2 feet  If I see a smile I am happy " Has been taking medications  Socializes with peers  Sleep: early awakening  Appetite: normal  Medication side effects: No   ROS: no complaints, denies any shortness of breath, chest pain or abdominal pain    Mental Status Evaluation:    Appearance:  casually dressed   Behavior:  bizarre, evasive   Speech:  pressured, hypertalkative, tangential   Mood:  labile, manic   Affect:  labile   Thought Process:  disorganized, illogical   Associations: tangential associations, flight of ideas   Thought Content:  grandiose, paranoid and bizarre delusions, Christianity preoccupation   Perceptual Disturbances: denies auditory or visual hallucinations when asked, but appears distracted   Risk Potential: Suicidal ideation - None  Homicidal ideation - None  Potential for aggression - Not at present   Sensorium:  oriented to person, place and time/date   Memory:  recent and remote memory grossly intact   Consciousness:  alert and awake   Attention: poor concentration and poor attention span   Insight:  poor   Judgment: poor   Gait/Station: normal gait/station and normal balance   Motor Activity: no abnormal movements     Vital signs in last 24 hours:    Temp:  [97 5 °F (36 4 °C)-97 8 °F (36 6 °C)] 97 5 °F (36 4 °C)  HR:  [80-83] 83  Resp:  [16] 16  BP: (152-169)/(88) 152/88    Laboratory results:  I have personally reviewed all pertinent laboratory/tests results      Progress Toward Goals: no significant improvement, still very manic, still labile, insight remains poor    Assessment/Plan   Principal Problem:    Bipolar I disorder, most recent episode manic, severe with psychotic features ([facility] )  Active Problems:    Alcohol abuse, continuous    Cannabis dependence, continuous ([facility] )    Recommended Treatment:     Planned medication and treatment changes: All current active medications have been reviewed  Encourage group therapy, milieu therapy and occupational therapy  Behavioral Health checks every 5 minutes  On 303 commitment up to 20 days - but filed an appeal  Appeal hearing scheduled for next week    Increase Risperdal to 2 mg bid  Decrease Ativan to 0 5 mg bid and taper off  Check Tegretol level, CBC and CMP in the morning     Continue all other medications:    Current Facility-Administered Medications:  acetaminophen 325 mg Oral Q6H PRN Khadar Pham MD   acetaminophen 650 mg Oral Q8H PRN Suki Pisano MD   acetaminophen 650 mg Oral Q6H PRN Suki Pisano MD   aluminum-magnesium hydroxide-simethicone 15 mL Oral Q4H PRN Khadar Pham MD   benztropine 2 mg Intramuscular Q6H PRN Barak Jeff MD   benztropine 2 mg Oral Q6H PRN Barak Jeff MD   carBAMazepine 200 mg Oral BID Barak Jeff MD   folic acid 1 mg Oral Daily Barak Jeff, MD   haloperidol 5 mg Oral Q6H PRN Barak Jeff MD   haloperidol lactate 5 mg Intramuscular Q6H PRN Barak Jeff MD   hydrOXYzine HCL 25 mg Oral Q6H PRN Khadar Pham MD   LORazepam 1 mg Intramuscular Q4H PRN Barak Jeff MD   LORazepam 0 5 mg Oral BID Barak Jeff MD   magnesium hydroxide 20 mL Oral Daily PRN Khadar Pham MD   multivitamin-minerals 1 tablet Oral Daily Barak Jeff MD   nicotine 21 mg Transdermal Daily Barak Jeff MD   OLANZapine 10 mg Intramuscular Q3H PRN Barak Jeff MD   risperiDONE 2 mg Oral Q4H PRN Barak Jeff MD   risperiDONE 2 mg Oral QPM Barak Jeff MD   [START ON 10/5/2018] risperiDONE 2 mg Oral Daily Barak Jeff, MD   thiamine 100 mg Oral Daily Maria Del Carmen Hernadez MD   traZODone 50 mg Oral HS PRN Grabiel Singer MD       Risks / Benefits of Treatment:    Risks, benefits, and possible side effects of medications explained to patient  Patient has limited understanding of risks and benefits of treatment at this time, but agrees to take medications as prescribed  Counseling / Coordination of Care: Total floor / unit time spent today 35 minutes  Greater than 50% of total time was spent with the patient and / or family counseling and / or coordination of care  A description of counseling / coordination of care:    Patient's progress discussed with staff in treatment team meeting  Medications, treatment progress and treatment plan reviewed with patient  Medication changes discussed with patient  Medication education provided to patient  Importance of medication and treatment compliance reviewed with patient  Discussed with patient acceptance of mental illness diagnosis and need for ongoing treatment after discharge  Reoriented to reality and reassured  I attended and testified at the 303 hearing today, with patient present at the hearing  The patient was committed for up to 20 days at the hearing      Walker Paget, MD 10/04/18

## 2018-10-04 NOTE — CASE MANAGEMENT
303 hearing held this AM  Pt attended  Pt met with PD prior to hearing  Committed for up to 20 days  NOTE: On 10/02, ASHU called [facility], [phone], to advise Dr Roxanne Owen (petitioning dr), of need for his appearance or testimony by phone for 303 hearing  On 10/2  SW spoke to nurse Lin, re: above  She was repeating what ASHU was asking  SW heard a male voice in the background, saying, "well that ain't gonna happen" (meaning that the dr would testify at the hearing)  Lin said she did not have a cell  # for the dr, & said she'd advise her charge nurse of ASHU's request  On 10/3, SW advised Elizabeth Tinsley CM supervisor, of above  She emailed Dr Ирина Marques, advising him of 303 hearing on 10/4, & that either his presence or his availability by phone was needed  Advised the dr to contact ASHU  On 10/5, SW had not heard from Dr Ирина WAKEFIELD called [facility], & spoke to nurse Lin, who ASHU spoke to on 10/2  She said Dr Dominique Sadler had been emailed by their staff re: [phone] hearing  Lin said that the dr was not at their ER today

## 2018-10-04 NOTE — PROGRESS NOTES
Pt denies all symptoms, is cooperative with medication  Pt continues to ask to see all medication packaging, wipes sink clean, appears obsessive  Pt full of energy, yells hello down the halls, can be loud, appears hypomanic  Pt is social and visible in the milieu, very talkative

## 2018-10-04 NOTE — MEDICAL STUDENT
Progress Note - Behavioral Health   Jefferson Stewart 41 y o  male MRN: [id number]  Unit/Bed#: KR0 627-01 Encounter: [id number]    Assessment/Plan   Principal Problem:    Bipolar I disorder, most recent episode manic, severe with psychotic features ([facility] )  Active Problems:    Alcohol abuse, continuous    C[facility] dependence, continuous ([facility] )    Plan:  1  Bipolar Disorder Type I, with psychotic features   -Patient is still exhibiting features of citlali  -Continue risperidone at 1 mg in the mornings and 2 mg in the evenings  -Continue carbamazepine 200 mg BID; check carbamazepine levels, CBC and CMP in the AM  -Encourage group therapy, occupational therapy and milieu therapy    2  Alcohol abuse   -Last drink 5 days ago; patient has exhibited no withdrawal symptoms  -Continue Lorazepam 0 5 mg BID; last dose tomorrow PM  -Continue folic acid, thiamine and vitamin supplements    Subjective:    Stephany Mccormick has no complaints today  He states his mood has improved since being admitted but again didn't offer how he has improved  He rambles at times during our conversation and is very tangential  He also stated he woke up in the middle of the night and exercised  He has been taking his medications, however he is still paranoid about them and continues checking the packages to ensure he is receiving the correct medications  Dannyia Anny has been counseling other patients on what to do to keep calm while they're in the unit and what to do after discharge  I asked Stephany Javierrené about what changes he has made to help himself improve rather than focusing on other patients and he only offered that he "changed the way I think"  He had his 303 hearing this morning where it was decided to continue his admission for up to 20 days      Behavior over the last 24 hours:  unchanged  Sleep: States he went to bed at 10:30 pm, woke up at 4:30 to read the bible and exercise and then went to sleep for another hour  Appetite: normal  Medication side effects: No  ROS: no complaints    Mental Status Evaluation:  Appearance:  age appropriate and casually dressed   Behavior:  cooperative   Speech:  pressured, tangential and repetitive   Mood:  Manic   Affect:  mood-congruent   Thought Process:  disorganized and tangential   Thought Content:  grandiose, paranoid ideations, Buddhist preoccupation   Perceptual Disturbances: None   Risk Potential: Suicidal Ideations none, Homicidal Ideations none and Potential for Aggression Yes patient is still manic   Sensorium:  person, place and time/date   Cognition:  grossly intact   Consciousness:  alert and awake    Attention: Poor concentration and attention span   Insight:  limited   Judgment: limited   Gait/Station: normal gait/station and normal balance   Motor Activity: no abnormal movements     Progress Toward Goals: Patient continues to make no improvement in his condition due to poor insight and inability to accept his diagnosis  Recommended Treatment: Continue with group therapy, milieu therapy and occupational therapy  Risks, benefits and possible side effects of Medications:   Risks, benefits, and possible side effects of medications explained to patient and patient verbalizes understanding  Medications: all current active meds have been reviewed

## 2018-10-05 LAB
ALBUMIN SERPL BCP-MCNC: 3.8 G/DL (ref 3.5–5)
ALP SERPL-CCNC: 79 U/L (ref 46–116)
ALT SERPL W P-5'-P-CCNC: 27 U/L (ref 12–78)
ANION GAP SERPL CALCULATED.3IONS-SCNC: 5 MMOL/L (ref 4–13)
AST SERPL W P-5'-P-CCNC: 20 U/L (ref 5–45)
BASOPHILS # BLD AUTO: 0.08 THOUSANDS/ΜL (ref 0–0.1)
BASOPHILS NFR BLD AUTO: 1 % (ref 0–1)
BILIRUB SERPL-MCNC: 0.29 MG/DL (ref 0.2–1)
BUN SERPL-MCNC: 13 MG/DL (ref 5–25)
CALCIUM SERPL-MCNC: 8.6 MG/DL (ref 8.3–10.1)
CARBAMAZEPINE SERPL-MCNC: 5.5 UG/ML (ref 4–12)
CHLAMYDIA DNA CVX QL NAA+PROBE: NORMAL
CHLORIDE SERPL-SCNC: 105 MMOL/L (ref 100–108)
CO2 SERPL-SCNC: 28 MMOL/L (ref 21–32)
CREAT SERPL-MCNC: 0.77 MG/DL (ref 0.6–1.3)
EOSINOPHIL # BLD AUTO: 0.26 THOUSAND/ΜL (ref 0–0.61)
EOSINOPHIL NFR BLD AUTO: 2 % (ref 0–6)
ERYTHROCYTE [DISTWIDTH] IN BLOOD BY AUTOMATED COUNT: 13.2 % (ref 11.6–15.1)
GFR SERPL CREATININE-BSD FRML MDRD: 113 ML/MIN/1.73SQ M
GLUCOSE P FAST SERPL-MCNC: 95 MG/DL (ref 65–99)
GLUCOSE SERPL-MCNC: 95 MG/DL (ref 65–140)
HBV CORE AB SER QL: NORMAL
HBV CORE IGM SER QL: NORMAL
HBV SURFACE AG SER QL: NORMAL
HCT VFR BLD AUTO: 46.6 % (ref 36.5–49.3)
HCV AB SER QL: NORMAL
HGB BLD-MCNC: 16.1 G/DL (ref 12–17)
HIV 1+2 AB+HIV1 P24 AG SERPL QL IA: NORMAL
HIV1 P24 AG SER QL: NORMAL
IMM GRANULOCYTES # BLD AUTO: 0.05 THOUSAND/UL (ref 0–0.2)
IMM GRANULOCYTES NFR BLD AUTO: 0 % (ref 0–2)
LYMPHOCYTES # BLD AUTO: 2.69 THOUSANDS/ΜL (ref 0.6–4.47)
LYMPHOCYTES NFR BLD AUTO: 23 % (ref 14–44)
MCH RBC QN AUTO: 32.7 PG (ref 26.8–34.3)
MCHC RBC AUTO-ENTMCNC: 34.5 G/DL (ref 31.4–37.4)
MCV RBC AUTO: 95 FL (ref 82–98)
MONOCYTES # BLD AUTO: 1.08 THOUSAND/ΜL (ref 0.17–1.22)
MONOCYTES NFR BLD AUTO: 9 % (ref 4–12)
N GONORRHOEA DNA GENITAL QL NAA+PROBE: NORMAL
NEUTROPHILS # BLD AUTO: 7.76 THOUSANDS/ΜL (ref 1.85–7.62)
NEUTS SEG NFR BLD AUTO: 65 % (ref 43–75)
NRBC BLD AUTO-RTO: 0 /100 WBCS
PLATELET # BLD AUTO: 272 THOUSANDS/UL (ref 149–390)
PMV BLD AUTO: 9.6 FL (ref 8.9–12.7)
POTASSIUM SERPL-SCNC: 4.2 MMOL/L (ref 3.5–5.3)
PROT SERPL-MCNC: 7.5 G/DL (ref 6.4–8.2)
RBC # BLD AUTO: 4.92 MILLION/UL (ref 3.88–5.62)
SODIUM SERPL-SCNC: 138 MMOL/L (ref 136–145)
WBC # BLD AUTO: 11.92 THOUSAND/UL (ref 4.31–10.16)

## 2018-10-05 PROCEDURE — 86705 HEP B CORE ANTIBODY IGM: CPT | Performed by: PSYCHIATRY & NEUROLOGY

## 2018-10-05 PROCEDURE — 80053 COMPREHEN METABOLIC PANEL: CPT | Performed by: PSYCHIATRY & NEUROLOGY

## 2018-10-05 PROCEDURE — 87806 HIV AG W/HIV1&2 ANTB W/OPTIC: CPT | Performed by: PSYCHIATRY & NEUROLOGY

## 2018-10-05 PROCEDURE — 99232 SBSQ HOSP IP/OBS MODERATE 35: CPT | Performed by: PSYCHIATRY & NEUROLOGY

## 2018-10-05 PROCEDURE — 86704 HEP B CORE ANTIBODY TOTAL: CPT | Performed by: PSYCHIATRY & NEUROLOGY

## 2018-10-05 PROCEDURE — 86803 HEPATITIS C AB TEST: CPT | Performed by: PSYCHIATRY & NEUROLOGY

## 2018-10-05 PROCEDURE — 87340 HEPATITIS B SURFACE AG IA: CPT | Performed by: PSYCHIATRY & NEUROLOGY

## 2018-10-05 PROCEDURE — 80156 ASSAY CARBAMAZEPINE TOTAL: CPT | Performed by: PSYCHIATRY & NEUROLOGY

## 2018-10-05 PROCEDURE — 85025 COMPLETE CBC W/AUTO DIFF WBC: CPT | Performed by: PSYCHIATRY & NEUROLOGY

## 2018-10-05 RX ORDER — CARBAMAZEPINE 200 MG/1
200 TABLET ORAL DAILY
Status: DISCONTINUED | OUTPATIENT
Start: 2018-10-06 | End: 2018-10-09 | Stop reason: HOSPADM

## 2018-10-05 RX ORDER — CARBAMAZEPINE 200 MG/1
400 TABLET ORAL EVERY EVENING
Status: DISCONTINUED | OUTPATIENT
Start: 2018-10-05 | End: 2018-10-09 | Stop reason: HOSPADM

## 2018-10-05 RX ADMIN — Medication 1 TABLET: at 09:00

## 2018-10-05 RX ADMIN — CARBAMAZEPINE 200 MG: 200 TABLET ORAL at 09:00

## 2018-10-05 RX ADMIN — RISPERIDONE 2 MG: 1 TABLET, ORALLY DISINTEGRATING ORAL at 17:11

## 2018-10-05 RX ADMIN — THIAMINE HCL TAB 100 MG 100 MG: 100 TAB at 09:00

## 2018-10-05 RX ADMIN — FOLIC ACID 1 MG: 1 TABLET ORAL at 09:00

## 2018-10-05 RX ADMIN — LORAZEPAM 0.5 MG: 0.5 TABLET ORAL at 08:59

## 2018-10-05 RX ADMIN — NICOTINE 21 MG: 21 PATCH, EXTENDED RELEASE TRANSDERMAL at 08:59

## 2018-10-05 RX ADMIN — CARBAMAZEPINE 400 MG: 200 TABLET ORAL at 17:11

## 2018-10-05 RX ADMIN — RISPERIDONE 2 MG: 1 TABLET, ORALLY DISINTEGRATING ORAL at 09:00
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
## 2018-10-05 NOTE — CASE MANAGEMENT
This Clinical Student spoke with Berlin Orozco, he reports he is aware of the appeal hearing on Tuesday 10/9, here on the unit  He reports he will attend the hearing
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
## 2018-10-05 NOTE — PROGRESS NOTES
Patient was running in murray, was able to redirect patient  Patient is grandiose    Patient speech remains pressured

## 2018-10-05 NOTE — PLAN OF CARE
DISCHARGE PLANNING     Discharge to home or other facility with appropriate resources Progressing        Ineffective Coping     Participates in unit activities [address] Will report no hallucinations or delusions Progressing        SAFETY, RESTRAINT - VIOLENT/SELF-DESTRUCTIVE     Remains free of harm/injury from restraints (Restraint for Violent/Self-Destructive Behavior) Progressing     Returns to optimal restraint-free functioning Progressing

## 2018-10-05 NOTE — PROGRESS NOTES
Patient called his   Patient thinks he is being discharged and is upset medications were increased/changed  Patient speech remains pressured/rapid  Patient has good eye contact and is cooperative

## 2018-10-05 NOTE — PROGRESS NOTES
Pt denies all symptoms  He displays some manic behavior, is sometimes loud, full of energy, exhibits pressured speech  He is cooperative with medications, possibly paranoid or obsessive- needs to see all medication packaging  Pt father visited today, visit went well  He is social and visible in the milieu

## 2018-10-06 PROCEDURE — 99232 SBSQ HOSP IP/OBS MODERATE 35: CPT | Performed by: PSYCHIATRY & NEUROLOGY

## 2018-10-06 RX ORDER — CLOTRIMAZOLE 1 %
CREAM (GRAM) TOPICAL 2 TIMES DAILY
Status: DISCONTINUED | OUTPATIENT
Start: 2018-10-06 | End: 2018-10-09 | Stop reason: HOSPADM

## 2018-10-06 RX ADMIN — NICOTINE 21 MG: 21 PATCH, EXTENDED RELEASE TRANSDERMAL at 09:03

## 2018-10-06 RX ADMIN — CLOTRIMAZOLE 1 APPLICATION: 10 CREAM TOPICAL at 17:17

## 2018-10-06 RX ADMIN — FOLIC ACID 1 MG: 1 TABLET ORAL at 09:04

## 2018-10-06 RX ADMIN — CARBAMAZEPINE 400 MG: 200 TABLET ORAL at 17:19

## 2018-10-06 RX ADMIN — THIAMINE HCL TAB 100 MG 100 MG: 100 TAB at 09:04

## 2018-10-06 RX ADMIN — CARBAMAZEPINE 200 MG: 200 TABLET ORAL at 09:04

## 2018-10-06 RX ADMIN — RISPERIDONE 2 MG: 1 TABLET, ORALLY DISINTEGRATING ORAL at 17:19

## 2018-10-06 RX ADMIN — RISPERIDONE 2 MG: 1 TABLET, ORALLY DISINTEGRATING ORAL at 09:03

## 2018-10-06 RX ADMIN — Medication 1 TABLET: at 09:03

## 2018-10-06 RX ADMIN — CLOTRIMAZOLE 1 APPLICATION: 10 CREAM TOPICAL at 13:12

## 2018-10-06 NOTE — PROGRESS NOTES
Patient out on unit, overly bright, pressured speech, intrusive, and social   Patient is med compliant  Patient denies SI/HI and A/V hallucinations

## 2018-10-06 NOTE — PROGRESS NOTES
Pt approached the nurses station at 0330 stating that he feels guilty that he has not been completely honest with staff  Pt then showed this writer a vape pen that he had in his possession  Pt states, "I had this in my things but no one noticed that I had it  I didn't use it, the battery isn't even working  It's a gift I got from my son and I wanted to keep it with me  I just feel bad that I haven't been honest " Pt's vape pen taken and placed in a bag with pt's label, and placed in the nurses station with other pt tobacco products
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
## 2018-10-06 NOTE — PROGRESS NOTES
Progress Note - Behavioral Health     Tres Keith 41 y o  male MRN: [id number]   Unit/Bed#: HF3 187-46 Encounter: [id number]    Behavior over the last 24 hours: limited improvement  Hector Gil is still grandiose and labile, but seems less irritable and has slightly less pressured speech  Compliant with medications  Attends groups  Sleep: early awakening  Appetite: normal  Medication side effects: No   ROS: no complaints, denies any headache, shortness of breath or back pain    Mental Status Evaluation:    Appearance:  casually dressed   Behavior:  cooperative   Speech:  tangential, slightly less pressured   Mood:  labile, less irritable   Affect:  labile   Thought Process:  tangential   Associations: tangential associations   Thought Content:  grandiose and paranoid delusions   Perceptual Disturbances: no auditory hallucinations, no visual hallucinations   Risk Potential: Suicidal ideation - None  Homicidal ideation - None  Potential for aggression - No   Sensorium:  oriented to person, place and time/date   Memory:  recent and remote memory grossly intact   Consciousness:  alert and awake   Attention: decreased concentration and decreased attention span   Insight:  impaired   Judgment: impaired   Gait/Station: normal gait/station and normal balance   Motor Activity: no abnormal movements     Vital signs in last 24 hours:    Temp:  [97 4 °F (36 3 °C)-98 °F (36 7 °C)] 98 °F (36 7 °C)  HR:  [63-72] 63  Resp:  [16] 16  BP: (153-159)/(86-93) 153/93    Laboratory results:  I have personally reviewed all pertinent laboratory/tests results      Hepatitis Panel:   Lab Results   Component Value Date    HEPBIGM Non-reactive 10/05/2018    HEPBCAB Non-reactive 10/05/2018    HEPBSAG Non-reactive 10/05/2018    HEPCAB Non-reactive 10/05/2018   HIV Tests:   Lab Results   Component Value Date    [id number] Non-Reactive 10/05/2018   MARISSA Mcclain   Lab Results   Component Value Date    LABNGO N  gonorrhoeae Amplified DNA Negative 10/04/2018   Chlamydia, DNA   Lab Results   Component Value Date    LABCHLA C  trachomatis Amplified DNA Negative 10/04/2018       Progress Toward Goals: limited progress, less irritable, still labile, insight remains poor    Assessment/Plan   Principal Problem:    Bipolar I disorder, most recent episode manic, severe with psychotic features ([facility] )  Active Problems:    Alcohol abuse, continuous    Cannabis dependence, continuous ([facility] )    Recommended Treatment:     Planned medication and treatment changes: All current active medications have been reviewed  Encourage group therapy, milieu therapy and occupational therapy  Behavioral Health checks every 5 minutes  On 303 commitment up to 20 days    Discontinue scheduled Ativan  Recheck Tegretol level in the morning     Continue all other medications:    Current Facility-Administered Medications:  acetaminophen 325 mg Oral Q6H PRN Mane Goodson MD   acetaminophen 650 mg Oral Q8H PRN Singh Beck MD   acetaminophen 650 mg Oral Q6H PRN Singh Beck MD   aluminum-magnesium hydroxide-simethicone 15 mL Oral Q4H PRN Mane Goodson MD   benztropine 2 mg Intramuscular Q6H PRN Lynne Gomez MD   benztropine 2 mg Oral Q6H PRN Lynne Gomez MD   carBAMazepine 200 mg Oral Daily Lynne Gomez MD   carBAMazepine 400 mg Oral QPM Lynne Gomez MD   folic acid 1 mg Oral Daily Lynne Gomez MD   haloperidol 5 mg Oral Q6H PRN Lynne Gomez MD   haloperidol lactate 5 mg Intramuscular Q6H PRN Lynne Gomez MD   hydrOXYzine HCL 25 mg Oral Q6H PRN Mane Goodson MD   LORazepam 1 mg Intramuscular Q4H PRN Lynne Gomez MD   magnesium hydroxide 20 mL Oral Daily PRN Mane Goodson MD   multivitamin-minerals 1 tablet Oral Daily Lynne oGmez MD   nicotine 21 mg Transdermal Daily Lynne Gomez MD   OLANZapine 10 mg Intramuscular Q3H PRN Lynne Gomez MD   risperiDONE 2 mg Oral Q4H PRN Lynne Gomez MD risperiDONE 2 mg Oral QPM Sammy Fisher MD   risperiDONE 2 mg Oral Daily Sammy Fisher MD   thiamine 100 mg Oral Daily Sammy Fisher MD   traZODone 50 mg Oral HS PRN Wil Broussard MD       Risks / Benefits of Treatment:    Risks, benefits, and possible side effects of medications explained to patient  Patient has limited understanding of risks and benefits of treatment at this time, but agrees to take medications as prescribed  Counseling / Coordination of Care:    Patient's progress reviewed with nursing staff  Medications, treatment progress and treatment plan reviewed with patient      Lauren Livingston MD 10/06/18

## 2018-10-06 NOTE — PLAN OF CARE
DISCHARGE PLANNING     Discharge to home or other facility with appropriate resources Progressing        Ineffective Coping     Participates in unit activities [address] Will report no hallucinations or delusions Progressing        SAFETY, RESTRAINT - VIOLENT/SELF-DESTRUCTIVE     Remains free of harm/injury from restraints (Restraint for Violent/Self-Destructive Behavior) Progressing     Returns to optimal restraint-free functioning Progressing
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
## 2018-10-07 LAB — CARBAMAZEPINE SERPL-MCNC: 7.2 UG/ML (ref 4–12)

## 2018-10-07 PROCEDURE — 99232 SBSQ HOSP IP/OBS MODERATE 35: CPT | Performed by: PSYCHIATRY & NEUROLOGY

## 2018-10-07 PROCEDURE — 80156 ASSAY CARBAMAZEPINE TOTAL: CPT | Performed by: PSYCHIATRY & NEUROLOGY

## 2018-10-07 RX ADMIN — CLOTRIMAZOLE 1 APPLICATION: 10 CREAM TOPICAL at 09:28

## 2018-10-07 RX ADMIN — NICOTINE 21 MG: 21 PATCH, EXTENDED RELEASE TRANSDERMAL at 09:26

## 2018-10-07 RX ADMIN — CARBAMAZEPINE 200 MG: 200 TABLET ORAL at 09:35

## 2018-10-07 RX ADMIN — RISPERIDONE 2 MG: 1 TABLET, ORALLY DISINTEGRATING ORAL at 09:26

## 2018-10-07 RX ADMIN — CARBAMAZEPINE 400 MG: 200 TABLET ORAL at 17:15

## 2018-10-07 RX ADMIN — RISPERIDONE 2 MG: 1 TABLET, ORALLY DISINTEGRATING ORAL at 17:15

## 2018-10-07 RX ADMIN — Medication 1 TABLET: at 09:25

## 2018-10-07 RX ADMIN — CLOTRIMAZOLE 1 APPLICATION: 10 CREAM TOPICAL at 17:12

## 2018-10-07 RX ADMIN — THIAMINE HCL TAB 100 MG 100 MG: 100 TAB at 09:25

## 2018-10-07 RX ADMIN — FOLIC ACID 1 MG: 1 TABLET ORAL at 09:27

## 2018-10-07 NOTE — PLAN OF CARE
DISCHARGE PLANNING     Discharge to home or other facility with appropriate resources Progressing        Ineffective Coping     Participates in unit activities [address] Will report no hallucinations or delusions Progressing        SAFETY, RESTRAINT - VIOLENT/SELF-DESTRUCTIVE     Remains free of harm/injury from restraints (Restraint for Violent/Self-Destructive Behavior) Progressing     Returns to optimal restraint-free functioning Progressing
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
## 2018-10-07 NOTE — PROGRESS NOTES
Patient is out on unit, social, loud, intrusive  Patient stated "Renetta Smith try out for the [employer] "  This writer reminded patient of his age and patient replied "I still got what it takes "  Patient has his room set up as a studio apartment  Patient was reminded that he may get a room mate Monday and he would need to rearrange furnishings to accommodate a new patient  Patient response was "It better not be some asshole "  Patient appears to be very comfortable on the unit

## 2018-10-08 PROCEDURE — 99232 SBSQ HOSP IP/OBS MODERATE 35: CPT | Performed by: PSYCHIATRY & NEUROLOGY

## 2018-10-08 RX ADMIN — Medication 1 TABLET: at 08:22

## 2018-10-08 RX ADMIN — CARBAMAZEPINE 200 MG: 200 TABLET ORAL at 08:23

## 2018-10-08 RX ADMIN — NICOTINE 21 MG: 21 PATCH, EXTENDED RELEASE TRANSDERMAL at 08:24

## 2018-10-08 RX ADMIN — CARBAMAZEPINE 400 MG: 200 TABLET ORAL at 17:32

## 2018-10-08 RX ADMIN — RISPERIDONE 2 MG: 1 TABLET, ORALLY DISINTEGRATING ORAL at 17:32

## 2018-10-08 RX ADMIN — CLOTRIMAZOLE 1 APPLICATION: 10 CREAM TOPICAL at 17:32

## 2018-10-08 RX ADMIN — THIAMINE HCL TAB 100 MG 100 MG: 100 TAB at 08:23

## 2018-10-08 RX ADMIN — RISPERIDONE 2 MG: 1 TABLET, ORALLY DISINTEGRATING ORAL at 08:23

## 2018-10-08 RX ADMIN — CLOTRIMAZOLE: 10 CREAM TOPICAL at 08:22

## 2018-10-08 RX ADMIN — FOLIC ACID 1 MG: 1 TABLET ORAL at 08:23
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
## 2018-10-08 NOTE — PROGRESS NOTES
Patient stated first thing about his medications, "You know I like to see the packages before they are opened?" I said that I was not informed of this but this request is not uncommon and no problem  Patient also instructed about the fact that there are 2 names for each drug, which he verbalized understanding  Patient denies SI/HI and denies any hallucinations at this time  Patient informed me that he slept well, already exercised this morning and is ready for his day  Patient also stated when asked about his treatment plan,"I don't want to talk about it but I know I want to go home " Patient was cooperative with medications and ate his breakfast as well

## 2018-10-08 NOTE — PROGRESS NOTES
Asha Irizarry CANDIDO helped patient shave his head and afterward he stated, "How does it look, it looks better right?"

## 2018-10-08 NOTE — PROGRESS NOTES
Progress Note - Behavioral Health     Garett Vivas 41 y o  male MRN: [id number]   Unit/Bed#: WH7 795-80 Encounter: [id number]    Behavior over the last 24 hours: improving  [place] continues to improve, is calm, cooperative with session and pleasant  His speech is much less pressured now and more organized  Does not seem irritable or paranoid during the session, although he still has some [employer] preoccupation (likely baseline)  Went to [employer] last night  Agrees now that he has bipolar disorder "being here woke me up", states he will take medications after discharge  Sleep: improved  Appetite: normal  Medication side effects: No   ROS: no complaints, denies any shortness of breath, chest pain or back pain    Mental Status Evaluation:    Appearance:  casually dressed   Behavior:  pleasant, cooperative, calm   Speech:  more organized, no longer pressured   Mood:  euthymic, no longer irritable   Affect:  appropriate   Thought Process:  concrete, more organized   Associations: concrete associations   Thought Content:  no overt delusions, [employer] preoccupation, paranoid ideation is resolved   Perceptual Disturbances: no auditory hallucinations, no visual hallucinations   Risk Potential: Suicidal ideation - None  Homicidal ideation - None  Potential for aggression - No   Sensorium:  oriented to person, place and time/date   Memory:  recent and remote memory grossly intact   Consciousness:  alert and awake   Attention: attention span and concentration are improving   Insight:  improving and partial   Judgment: improving and partial   Gait/Station: normal gait/station and normal balance   Motor Activity: no abnormal movements     Vital signs in last 24 hours:    Temp:  [97 8 °F (36 6 °C)] 97 8 °F (36 6 °C)  HR:  [74-76] 76  Resp:  [16] 16  BP: (151-161)/(82-83) 161/83    Laboratory results:  I have personally reviewed all pertinent laboratory/tests results      Progress Toward Goals: progressing, insight is slowly improving, mood is stabilizing, discharge planning    Assessment/Plan   Principal Problem:    Bipolar I disorder, most recent episode manic, severe with psychotic features ([facility] [address] )  Active Problems:    Alcohol abuse, continuous    Cannabis dependence, continuous ([facility] [address] )    Recommended Treatment:     Planned medication and treatment changes: All current active medications have been reviewed  Encourage group therapy, milieu therapy and occupational therapy  Behavioral Health checks every 5 minutes  On 303 commitment up to 20 days - appeal scheduled for tomorrow  Possible discharge tomorrow if he continues to improve - at present no criteria to further extend 303  Considering cancelling 303 appeal tomorrow if he continues to make progress     to contact father to assess support and baseline  Recheck Tegretol level, CBC and CMP in the morning     Continue current medications:    Current Facility-Administered Medications:  acetaminophen 325 mg Oral Q6H PRN Yovana Fung MD   acetaminophen 650 mg Oral Q8H PRN Aayush Lord MD   acetaminophen 650 mg Oral Q6H PRN Aayush Lord MD   aluminum-magnesium hydroxide-simethicone 15 mL Oral Q4H PRN Yovana Fung MD   benztropine 2 mg Intramuscular Q6H PRN Constance Haywood, MD   benztropine 2 mg Oral Q6H PRN Constance Haywood, MD   carBAMazepine 200 mg Oral Daily Constance Haywood, MD   carBAMazepine 400 mg Oral QPM Constance Haywood, MD   clotrimazole  Topical BID Constance Haywood, MD   folic acid 1 mg Oral Daily Constance Haywood, MD   haloperidol 5 mg Oral Q6H PRN Constance Haywood, MD   haloperidol lactate 5 mg Intramuscular Q6H PRN Constance Haywood, MD   hydrOXYzine HCL 25 mg Oral Q6H PRN Yovana Fung MD   LORazepam 1 mg Intramuscular Q4H PRN Constance Haywood, MD   magnesium hydroxide 20 mL Oral Daily PRN Yovana Fung MD   multivitamin-minerals 1 tablet Oral Daily Constance Haywood, MD   nicotine 21 mg Transdermal Daily Cristian Batch Hermila Arellano MD   OLANZapine 10 mg Intramuscular Q3H PRN Morena Abebe MD   risperiDONE 2 mg Oral Q4H PRN Morena Abebe MD   risperiDONE 2 mg Oral QPM Morena Abebe MD   risperiDONE 2 mg Oral Daily Morena Abebe MD   thiamine 100 mg Oral Daily Morena Abebe MD   traZODone 50 mg Oral HS PRN Sheri Charles MD       Risks / Benefits of Treatment:    Risks, benefits, and possible side effects of medications explained to patient and patient verbalizes understanding and agreement for treatment  Counseling / Coordination of Care:    Patient's progress discussed with staff in treatment team meeting  Medications, treatment progress and treatment plan reviewed with patient  Discharge plan discussed with patient      [place] Sad[place] MD 10/08/18

## 2018-10-08 NOTE — DISCHARGE INSTR - OTHER ORDERS
[facility] - [phone]    [place] [address] Hotline # - [phone]                       - Attend AA meetings as scheduled - list of meetings given to pt    NA Hotline # - [phone]                       - Attend NA meetings as scheduled - list of meetings given to pt

## 2018-10-08 NOTE — PROGRESS NOTES
Patient has repeatedly asked for the razor to shave his head  At first, there were no blades for the razor and then there was no staff to stay with him  Patient wanted this writer to sit and watch him shave  I told him, if I am caught up and no one has sat with him to shave then I would do it  I explained that his request is not being ignored, just cannot be done now

## 2018-10-08 NOTE — CASE MANAGEMENT
ASHU met with pt  SW asked pt about his behavior over past wkend re: removing things off of doors, etc  Pt denied doing anything like that  Asked pt to sign DAMIAN for his dad, Jennifer Beard, [phone], which pt did  SW said he told her that he was "ramírez"  Pt said that his brother was ramírez  Ramesh Stable his dad visited over past wkend  Said their visit was good  Denied S/S of admission  Pleasant  Polite  ASHU called [facility]  Scheduled intake appt on 10/13 @ 11:15 AM with Erika WAKEFIELD called TERRENCE [facility] office)  Intake appt scheduled on 10/17 @ 1:30 PM with Anay WAKEFIELD called pt's dad  Said he's talked to pt via phone & has visited pt twice  Dad said that pt seemed better  Dad asked about D/C  ASHU said she didn't know when pt would be D/C  ASHU asked if he had any concerns re: pt's D/C  Dad said no  He asked about follow-up  ASHU advised him of appts for MH & D&A  He was in agreement  He agreed with D/C plan  SW rec'd call from [facility], re: appeal hearing for tomorrow @ 11 AM  Told SW to contact her IT dept to hook-up the computer to the [facility] for the hearing  SW called IT dept  Spoke to [employer] re: above  Said he'd come to the unit tomorrow @ 10:30 AM,  to hook-up the computer for the hearing

## 2018-10-08 NOTE — PLAN OF CARE
Problem: PSYCHOSIS  Goal: Will report no hallucinations or delusions  Interventions:  - Administer medication as  ordered  - Every waking shifts and PRN assess for the presence of hallucinations and or delusions  - Assist with reality testing to support increasing orientation  - Assess if patient's hallucinations or delusions are encouraging self-harm or harm to others and intervene as appropriate   Outcome: Progressing      Problem: SAFETY, RESTRAINT - VIOLENT/SELF-DESTRUCTIVE  Goal: Remains free of harm/injury from restraints (Restraint for Violent/Self-Destructive Behavior)  INTERVENTIONS:  - Instruct patient/family regarding restraint use   - Assess and monitor physiologic and psychological status   - Provide interventions and comfort measures to meet assessed patient needs   - Ensure continuous in person monitoring is provided   - Identify and implement measures to help patient regain control  - Assess readiness for release of restraint   Outcome: Progressing    Goal: Returns to optimal restraint-free functioning  INTERVENTIONS:  - Assess the patient's behavior and symptoms that indicate continued need for restraint  - Identify and implement measures to help patient regain control  - Assess readiness for release of restraint    Outcome: Progressing      Problem: DISCHARGE PLANNING  Goal: Discharge to home or other facility with appropriate resources  INTERVENTIONS:  - Identify barriers to discharge w/patient and caregiver  - Arrange for needed discharge resources and transportation as appropriate  - Identify discharge learning needs (meds, wound care, etc )  - Refer to Case Management Department for coordinating discharge planning if the patient needs post-hospital services based on physician/advanced practitioner order or complex needs related to functional status, cognitive ability, or social support system    INTERVENTIONS:  - Identify barriers to discharge w/patient and caregiver  - Arrange for needed discharge resources and transportation as appropriate  - Identify discharge learning needs (meds, wound care, etc )  - Refer to Case Management Department for coordinating discharge planning if the patient needs post-hospital services based on physician/advanced practitioner order or complex needs related to functional status, cognitive ability, or social support system   Outcome: Progressing      Problem: Ineffective Coping  Goal: Participates in unit activities  Interventions:  - Provide therapeutic environment   - Provide required programming   - Redirect inappropriate behaviors    Outcome: Progressing      Comments: Patient reports no hallucinations and has been cooperative so far

## 2018-10-09 VITALS
BODY MASS INDEX: 25.86 KG/M2 | WEIGHT: 174.6 LBS | HEART RATE: 101 BPM | RESPIRATION RATE: 16 BRPM | HEIGHT: 69 IN | SYSTOLIC BLOOD PRESSURE: 153 MMHG | TEMPERATURE: 97.9 F | DIASTOLIC BLOOD PRESSURE: 88 MMHG

## 2018-10-09 LAB
ALBUMIN SERPL BCP-MCNC: 3.9 G/DL (ref 3.5–5)
ALP SERPL-CCNC: 66 U/L (ref 46–116)
ALT SERPL W P-5'-P-CCNC: 27 U/L (ref 12–78)
ANION GAP SERPL CALCULATED.3IONS-SCNC: 4 MMOL/L (ref 4–13)
AST SERPL W P-5'-P-CCNC: 15 U/L (ref 5–45)
BASOPHILS # BLD AUTO: 0.11 THOUSANDS/ΜL (ref 0–0.1)
BASOPHILS NFR BLD AUTO: 1 % (ref 0–1)
BILIRUB SERPL-MCNC: 0.3 MG/DL (ref 0.2–1)
BUN SERPL-MCNC: 12 MG/DL (ref 5–25)
CALCIUM SERPL-MCNC: 8.7 MG/DL (ref 8.3–10.1)
CARBAMAZEPINE SERPL-MCNC: 7.4 UG/ML (ref 4–12)
CHLORIDE SERPL-SCNC: 104 MMOL/L (ref 100–108)
CO2 SERPL-SCNC: 30 MMOL/L (ref 21–32)
CREAT SERPL-MCNC: 0.74 MG/DL (ref 0.6–1.3)
EOSINOPHIL # BLD AUTO: 0.3 THOUSAND/ΜL (ref 0–0.61)
EOSINOPHIL NFR BLD AUTO: 3 % (ref 0–6)
ERYTHROCYTE [DISTWIDTH] IN BLOOD BY AUTOMATED COUNT: 13.1 % (ref 11.6–15.1)
GFR SERPL CREATININE-BSD FRML MDRD: 115 ML/MIN/1.73SQ M
GLUCOSE P FAST SERPL-MCNC: 82 MG/DL (ref 65–99)
GLUCOSE SERPL-MCNC: 82 MG/DL (ref 65–140)
HCT VFR BLD AUTO: 48.2 % (ref 36.5–49.3)
HGB BLD-MCNC: 16.1 G/DL (ref 12–17)
IMM GRANULOCYTES # BLD AUTO: 0.03 THOUSAND/UL (ref 0–0.2)
IMM GRANULOCYTES NFR BLD AUTO: 0 % (ref 0–2)
LYMPHOCYTES # BLD AUTO: 2.38 THOUSANDS/ΜL (ref 0.6–4.47)
LYMPHOCYTES NFR BLD AUTO: 25 % (ref 14–44)
MCH RBC QN AUTO: 32.1 PG (ref 26.8–34.3)
MCHC RBC AUTO-ENTMCNC: 33.4 G/DL (ref 31.4–37.4)
MCV RBC AUTO: 96 FL (ref 82–98)
MONOCYTES # BLD AUTO: 0.99 THOUSAND/ΜL (ref 0.17–1.22)
MONOCYTES NFR BLD AUTO: 10 % (ref 4–12)
NEUTROPHILS # BLD AUTO: 5.86 THOUSANDS/ΜL (ref 1.85–7.62)
NEUTS SEG NFR BLD AUTO: 61 % (ref 43–75)
NRBC BLD AUTO-RTO: 0 /100 WBCS
PLATELET # BLD AUTO: 276 THOUSANDS/UL (ref 149–390)
PMV BLD AUTO: 9.5 FL (ref 8.9–12.7)
POTASSIUM SERPL-SCNC: 3.9 MMOL/L (ref 3.5–5.3)
PROT SERPL-MCNC: 7.6 G/DL (ref 6.4–8.2)
RBC # BLD AUTO: 5.02 MILLION/UL (ref 3.88–5.62)
SODIUM SERPL-SCNC: 138 MMOL/L (ref 136–145)
WBC # BLD AUTO: 9.67 THOUSAND/UL (ref 4.31–10.16)

## 2018-10-09 PROCEDURE — 85025 COMPLETE CBC W/AUTO DIFF WBC: CPT | Performed by: PSYCHIATRY & NEUROLOGY

## 2018-10-09 PROCEDURE — 80156 ASSAY CARBAMAZEPINE TOTAL: CPT | Performed by: PSYCHIATRY & NEUROLOGY

## 2018-10-09 PROCEDURE — 80053 COMPREHEN METABOLIC PANEL: CPT | Performed by: PSYCHIATRY & NEUROLOGY

## 2018-10-09 PROCEDURE — 99239 HOSP IP/OBS DSCHRG MGMT >30: CPT | Performed by: PSYCHIATRY & NEUROLOGY

## 2018-10-09 RX ORDER — CARBAMAZEPINE 200 MG/1
200 TABLET ORAL DAILY
Qty: 90 TABLET | Refills: 1 | Status: SHIPPED | OUTPATIENT
Start: 2018-10-10 | End: 2021-09-27

## 2018-10-09 RX ORDER — RISPERIDONE 2 MG/1
2 TABLET, FILM COATED ORAL 2 TIMES DAILY
Qty: 60 TABLET | Refills: 1 | Status: SHIPPED | OUTPATIENT
Start: 2018-10-09 | End: 2021-09-27

## 2018-10-09 RX ORDER — CLOTRIMAZOLE 1 %
CREAM (GRAM) TOPICAL 2 TIMES DAILY
Qty: 30 G | Refills: 0 | Status: SHIPPED | OUTPATIENT
Start: 2018-10-09 | End: 2021-09-27

## 2018-10-09 RX ORDER — NICOTINE 21 MG/24HR
1 PATCH, TRANSDERMAL 24 HOURS TRANSDERMAL DAILY
Qty: 21 PATCH | Refills: 0 | Status: SHIPPED | OUTPATIENT
Start: 2018-10-10 | End: 2021-09-27

## 2018-10-09 RX ADMIN — RISPERIDONE 2 MG: 1 TABLET, ORALLY DISINTEGRATING ORAL at 08:55

## 2018-10-09 RX ADMIN — CLOTRIMAZOLE: 10 CREAM TOPICAL at 08:56

## 2018-10-09 RX ADMIN — CARBAMAZEPINE 200 MG: 200 TABLET ORAL at 08:55

## 2018-10-09 RX ADMIN — NICOTINE 21 MG: 21 PATCH, EXTENDED RELEASE TRANSDERMAL at 08:54

## 2018-10-09 RX ADMIN — Medication 1 TABLET: at 08:55

## 2018-10-09 RX ADMIN — FOLIC ACID 1 MG: 1 TABLET ORAL at 08:55

## 2018-10-09 RX ADMIN — THIAMINE HCL TAB 100 MG 100 MG: 100 TAB at 08:55

## 2018-10-09 NOTE — PLAN OF CARE
Problem: PSYCHOSIS  Goal: Will report no hallucinations or delusions  Interventions:  - Administer medication as  ordered  - Every waking shifts and PRN assess for the presence of hallucinations and or delusions  - Assist with reality testing to support increasing orientation  - Assess if patient's hallucinations or delusions are encouraging self-harm or harm to others and intervene as appropriate   Outcome: Completed Date Met: 10/09/18      Problem: SAFETY, RESTRAINT - VIOLENT/SELF-DESTRUCTIVE  Goal: Remains free of harm/injury from restraints (Restraint for Violent/Self-Destructive Behavior)  INTERVENTIONS:  - Instruct patient/family regarding restraint use   - Assess and monitor physiologic and psychological status   - Provide interventions and comfort measures to meet assessed patient needs   - Ensure continuous in person monitoring is provided   - Identify and implement measures to help patient regain control  - Assess readiness for release of restraint   Outcome: Completed Date Met: 10/09/18    Goal: Returns to optimal restraint-free functioning  INTERVENTIONS:  - Assess the patient's behavior and symptoms that indicate continued need for restraint  - Identify and implement measures to help patient regain control  - Assess readiness for release of restraint    Outcome: Completed Date Met: 10/09/18      Problem: DISCHARGE PLANNING  Goal: Discharge to home or other facility with appropriate resources  INTERVENTIONS:  - Identify barriers to discharge w/patient and caregiver  - Arrange for needed discharge resources and transportation as appropriate  - Identify discharge learning needs (meds, wound care, etc )  - Refer to Case Management Department for coordinating discharge planning if the patient needs post-hospital services based on physician/advanced practitioner order or complex needs related to functional status, cognitive ability, or social support system    INTERVENTIONS:  - Identify barriers to discharge w/patient and caregiver  - Arrange for needed discharge resources and transportation as appropriate  - Identify discharge learning needs (meds, wound care, etc )  - Refer to Case Management Department for coordinating discharge planning if the patient needs post-hospital services based on physician/advanced practitioner order or complex needs related to functional status, cognitive ability, or social support system   Outcome: Completed Date Met: 10/09/18      Problem: Ineffective Coping  Goal: Participates in unit activities  Interventions:  - Provide therapeutic environment   - Provide required programming   - Redirect inappropriate behaviors    Outcome: Completed Date Met: 10/09/18

## 2018-10-09 NOTE — CASE MANAGEMENT
ASHU rec'd ronaldo from [facility], re: appeal hearing scheduled for today @ 11 AM  He heard that the PD heard that pt to be D/C today  Kelsie Lawler he was going to a hearing & would see PD  Will advise after speaking to PD  SW was advised that pt spoke to PD, & advised that he was being D/C today  ASHU called Juan & advised of pt's D/C  He said that the appeal hearing would be cancelled  ASHU called IT dept  Spoke to Ion, & asked if she could let [place] know of cancelled hearing (no need to connect to [facility])  Said she'd [employer] advising he did not have to come to [address]      ASHU spoke to pt prior to D/C  Affect bright  Pt looking forward to D/C  ASHU called pt's father, Aruna Cid, [phone], @ 10:10 AM, & advised of pt's D/C  Said he spoke to pt this AM re: D/C  Kelsie Lawler he was currently on a bus to come to pick pt up  Will be at hospital within an hr  ASHU advised Cynthia Moffett, nurse, of father's arrival time  ASHU scheduled intake appts @ [facility] on 1013 @ 11:15 AM with Raisa Meade, & @ [facility] on 10/17 @ 1:30 PM with Celia Valdez

## 2018-10-09 NOTE — PROGRESS NOTES
Patient spent most of the evening in small TV room with 2-3 peers  They were laughing and socializing all evening

## 2018-10-09 NOTE — DISCHARGE INSTR - ACTIVITY
Contact Information: If you have any questions, concerns, pended studies, tests and/or procedures, or emergencies regarding your inpatient behavioral health visit  Please contact [facility] [phone] and ask to speak to a , nurse or physician  A contact is available 24 hours/ 7 days a week at this number  Summary of Procedures Performed During your Stay:  Below is a list of major procedures performed during your hospital stay and a summary of results:  - No major procedures performed  Pending Studies     Start     Ordered    10/09/18 0000  Carbamazepine level, total      10/09/18 0923    10/09/18 0000  CBC and differential      10/09/18 0923    10/09/18 0000  Comprehensive metabolic panel      26/82/98 0923        If studies are pending at discharge, follow up with your PCP and/or referring provider

## 2018-10-09 NOTE — DISCHARGE INSTRUCTIONS
Abuse of Alcohol   WHAT YOU NEED TO KNOW:   What is alcohol abuse? · Alcohol abuse is unhealthy drinking behavior  You may drink too much once a week, or continue to drink too much daily  You continue to drink even though it causes problems  The problems may include legal problems, problems at work, or problems with relationships  · If you drink too much at one time, you are binge drinking  Binge drinking is when you have a large amount of alcohol in a short time  Your blood alcohol concentrations (REJI) goes above 0 08 g/dLlevel during binge drinking  For men, this usually happens with more than 4 drinks in 2 hours  For women, it is more than 3 drinks in 2 hours  A drink is 12 ounces of beer, 4 ounces of wine, or 1½ ounces of liquor  What are the signs and symptoms of alcohol abuse? Each person that abuses alcohol may have different symptoms  The following are common signs and symptoms of alcohol abuse:  · Loss of interest in activities, work, and school    · Decreased interest in family and friends    · Depression    · Constant thoughts about drinking    · Not able to control the amount you drink    · Restlessness, or erratic and violent behavior  What are the long-term effects of alcohol abuse? · Blackouts    · Memory loss    · Dementia    · Liver disease    · Thiamine (vitamin B1) deficiency  What treatments or therapies are used to treat alcohol abuse? · Detoxification (detox) and withdrawal  is a program that helps you to safely get alcohol out of your body  Detox can also help get rid of the physical need to drink  Healthcare providers monitor the physical symptoms of withdrawal  They may give you medicines to help decrease nausea, dehydration, and seizures  Healthcare providers will also monitor your blood pressure, heart and breathing rates, and your temperature  Symptoms of anxiety, depression, and suicidal thoughts are also monitored and managed during detox   Healthcare providers may give you medicines for these symptoms and therapy sessions will be available to you  Detox is usually done at a detox center or in a hospital  Healthcare providers do not recommend that you try to detox at home or by yourself  Withdrawal symptoms may become life-threatening  The center can help you find 12 step programs or an individual therapist to help with emotional support after detox  · Inpatient and outpatient treatment  focus on your personal needs to help you stop drinking  Treatment helps you understand the reasons you abuse alcohol  Counselors and therapists provide you with support and help you find ways to cope instead of drinking  You may need inpatient treatment to provide a controlled environment  You may need outpatient treatment after your inpatient treatment is complete  · Alcohol aversion therapy  takes away the desire to drink by causing a negative reaction when you drink  Healthcare providers may give you medicines that cause nausea and vomiting when you drink alcohol  They may instead give you a medicine that decreases your urge to drink alcohol  These medicines are used to help you stop drinking or reduce the amount you drink  They can also help you avoid relapse  What are the risks of alcohol abuse? Alcohol abuse increases your risk for gastrointestinal cancers, brain damage and problems with your immune system  It also increases your risk for heart, kidney, and lung damage  The risk of stroke increases with alcohol abuse  If you are pregnant and drink alcohol, you and your baby are at risk for serious health problems  Where can I find support and more information? · Alcoholics Anonymous  Web Address: http://www payne info/  · Substance Abuse and [address] [address]  Web Address: https://Pososhok.ru/  Call 919 for the following:   · You have sudden chest pain or trouble breathing  · You want to harm yourself or others      · You have a seizure or have shaking or trembling  When should I seek immediate care? · You have hallucinations (you see or hear things that are not real)  · You cannot stop vomiting or you vomit blood  When should I contact my healthcare provider? · You need help to stop drinking alcohol  · You have questions or concerns about your condition or care  CARE AGREEMENT:   You have the right to help plan your care  Learn about your health condition and how it may be treated  Discuss treatment options with your caregivers to decide what care you want to receive  You always have the right to refuse treatment  The above information is an  only  It is not intended as medical advice for individual conditions or treatments  Talk to your doctor, nurse or pharmacist before following any medical regimen to see if it is safe and effective for you  © 2017 [address] Information is for End User's use only and may not be sold, redistributed or otherwise used for commercial purposes  All illustrations and images included in CareNotes® are the copyrighted property of [employer]  or [employer]  Bipolar Disorder   WHAT YOU NEED TO KNOW:   What is bipolar disorder? Bipolar disorder is a long-term chemical imbalance that causes rapid changes in mood and behavior  High moods are called citlali  Low moods are called depression  Sometimes you will feel manic and sometimes you will feel depressed  You can have alternating episodes of citlali and depression  This is called a mixed bipolar state  What increases my risk for bipolar disorder? Bipolar disorder is caused by a chemical imbalance  You are more likely to have bipolar disorder if someone in your family has a mood disorder  Stress, and drug or alcohol abuse are the most common triggers for bipolar disorder symptoms  What are the signs and symptoms of citlali?    · Being easily distracted or agitated, or focusing all your attention on a goal    · Insomnia (trouble sleeping) or not needing as much sleep as usual    · Inflated self-esteem or belief in abilities    · Racing thoughts that may not make sense or be understood by others    · Speech that is faster than usual, or you talk more than usual    · Increased thoughts about sex    · Happy and care free, with a sudden change to anger or irritability    · Hallucinations that cause you to see and hear things that are not really there  What are the signs and symptoms depression? · Anger, worry, anxiousness, or irritability    · Lack of energy    · Sadness or emptiness    · Crying for long periods    · Low self-esteem or sense of worthlessness    · Negative thoughts or not caring about anything    · Too much or too little sleep  How is bipolar disorder treated? There is no cure for bipolar disorder, but medicines may be used to control your mood swings  You may need to see a therapist or psychiatrist regularly for counseling  You may need to go into the hospital for tests and treatment  Where can I find support and more information? · [address] [facility], Public Information & Communication Branch  [address], [address], [address]  [place] [address]   Phone: [phone]  Phone: [phone]  Web Address: [facility]  · Depression and [address] ([facility])  [address] [place] [address] , [address] [place] [address]  Phone: [phone]  Web Address: [employer]   Call 911 if:   · You think about hurting yourself or someone else  When should I contact my healthcare provider? · You are having trouble managing your bipolar disorder  · You cannot sleep, or are sleeping all the time  · You cannot eat, or are eating more than usual     · You feel dizzy or your stomach is upset  · You cannot make it to your next appointment      · You have questions or concerns about your condition or care   CARE AGREEMENT:   You have the right to help plan your care  Learn about your health condition and how it may be treated  Discuss treatment options with your caregivers to decide what care you want to receive  You always have the right to refuse treatment  The above information is an  only  It is not intended as medical advice for individual conditions or treatments  Talk to your doctor, nurse or pharmacist before following any medical regimen to see if it is safe and effective for you  © 2017 [address] [employer] Information is for End User's use only and may not be sold, redistributed or otherwise used for commercial purposes  All illustrations and images included in CareNotes® are the copyrighted property of A [employer] M [employer]  or [employer]  Psychotic Disorder   WHAT YOU NEED TO KNOW:   What is a psychotic disorder? A psychotic disorder is a medical condition that causes hallucinations and delusions  Schizophrenia and schizoaffective disorder are examples of psychotic disorders  What are the signs and symptoms of a psychotic disorder? You may have hallucinations and delusions  Hallucinations are seeing, hearing, tasting, or feeling things that are not real  Delusions are beliefs that something is real, true, or right when it is not  These false beliefs do not go away even if there is proof that they are not true  You may believe someone is spying on you, chasing after you, or controlling your mind  You may also believe there is something wrong with how your body works   You may also have any of the following:  · Feeling scared or confused    · Speaking quickly, loudly, or slowly, or saying things that do not make sense    · Feeling restless or irritable    · Quick changes in your mood    · Avoiding others or acting nervous around others    · Trouble concentrating or thinking clearly    · Thoughts of self-harm, suicide, or your own death    · Poor school or work performance    · Poor personal hygiene  What causes or increases my risk for a psychotic disorder? The cause of a psychotic disorder may not be known  A psychotic disorder may happen with other mental illnesses, such as borderline personality disorder or bipolar disorder  Your risk for a psychotic disorder may be higher with any of the following:  · Drug or alcohol abuse    · Stressful events such as the death of a loved one, abuse, parents' divorce, or loss of a friendship    · Certain medicines, such as steroids, antidepressants, or digoxin    · Parents, siblings, or other family members with a history of a psychotic disorder    · Medical conditions such as Parkinson's disease, HIV, stroke, or epilepsy    · Infection, a brain injury, or a brain tumor  How is a psychotic disorder diagnosed? Your healthcare provider will ask about your symptoms and how long you have had them  He or she will ask if you have any family members with a mental illness  Tell your healthcare provider about any stressful events in your life  He or she may ask about any other health conditions or medicines you take  You may need blood tests to get information about your overall health  CT or MRI pictures may show problems in your brain that can cause a psychotic disorder  How is a psychotic disorder treated? A psychotic disorder can be treated  Treatment can help decrease your symptoms and make you feel better  You may need any of the following:  · Medicines  may be given to decrease your symptoms  You may need 1 or more medicines  You may need to take your medicine for several weeks before you begin to feel better  Tell your healthcare provider about any side effects or problems you have with your medicines  The type or amount of medicine may need to be changed  · Cognitive behavioral therapy  (CBT) helps you create more realistic, appropriate thoughts about yourself and your behaviors   You may work individually with a mental health provider  CBT may also be done with a group of people that have a psychotic disorder  CBT may be combined with medicines that help treat your psychotic disorder  · Family counseling  can help you and your family work together to understand and manage your illness  What can I do to manage my symptoms? · Get support  It may be difficult to cope with your illness  You may feel lonely, anxious, or depressed  It may help to join a support group  A support group lets you talk with others who have a mental illness  For information and more support visit:  [employer] on Mental Illness  [address]  [facility]  , [address] [facility] , [address] [place] [address]  Phone: [phone]  Phone: [phone]  Web Address: http://www yang com/  org      · Do not drink alcohol or use illegal drugs  Alcohol and illegal drugs can make your symptoms worse  Ask your healthcare provider for information if you currently drink alcohol or use illegal drugs and need help to quit  · Do not smoke  Nicotine and other chemicals in cigarettes and cigars can cause lung damage  They can also decrease how well your medicine works  Ask your healthcare provider for information if you currently smoke and need help to quit  E-cigarettes or smokeless tobacco still contain nicotine  Talk to your healthcare provider before you use these products  · Exercise regularly  Exercise can help improve your mood and decrease symptoms  Ask about the best exercise plan for you  · Manage your stress  Stress can make your condition worse  Ask your healthcare provider for more information about practicing mindfulness and deep breathing exercises to help decrease your stress  You may learn other ways to manage stress during therapy  Call 931 if:   · You feel like you could harm yourself or someone else  When should I contact my healthcare provider? · Your symptoms do not improve  · You cannot make it to your next appointment       · You have new symptoms  · You have questions or concerns about your condition or care  CARE AGREEMENT:   You have the right to help plan your care  Learn about your health condition and how it may be treated  Discuss treatment options with your caregivers to decide what care you want to receive  You always have the right to refuse treatment  The above information is an  only  It is not intended as medical advice for individual conditions or treatments  Talk to your doctor, nurse or pharmacist before following any medical regimen to see if it is safe and effective for you  © 2017 [address] [employer]  Information is for End User's use only and may not be sold, redistributed or otherwise used for commercial purposes  All illustrations and images included in CareNotes® are the copyrighted property of [employer] M [employer]  or [employer] Chávez

## 2018-12-04 NOTE — PROGRESS NOTES
Patient was very preoccupied this evening with how he could get the 302 reversed  Once the process was explained to him he accepted this  He was ambivalent about taking medication but when the rationale for the medication was explained he accepted this also  He said he didn't think he needed to be here but he was going to stay calm  He has been appropriate in the milieu and has maintained impulse control  No withdrawal symptoms noted or reported  He did not receive HS Ativan because he was asleep at that time  All other review of systems negative, except as noted in HPI

## 2021-09-27 ENCOUNTER — HOSPITAL ENCOUNTER (EMERGENCY)
Facility: HOSPITAL | Age: 46
Discharge: HOME | End: 2021-09-27

## 2021-09-27 ENCOUNTER — APPOINTMENT (EMERGENCY)
Dept: RADIOLOGY | Facility: HOSPITAL | Age: 46
End: 2021-09-27

## 2021-09-27 VITALS
WEIGHT: 210.4 LBS | DIASTOLIC BLOOD PRESSURE: 103 MMHG | HEART RATE: 69 BPM | RESPIRATION RATE: 18 BRPM | OXYGEN SATURATION: 98 % | SYSTOLIC BLOOD PRESSURE: 153 MMHG | BODY MASS INDEX: 31.16 KG/M2 | TEMPERATURE: 98 F | HEIGHT: 69 IN

## 2021-09-27 DIAGNOSIS — S62.635B OPEN DISPLACED FRACTURE OF DISTAL PHALANX OF LEFT RING FINGER, INITIAL ENCOUNTER: Primary | ICD-10-CM

## 2021-09-27 PROCEDURE — 90471 IMMUNIZATION ADMIN: CPT

## 2021-09-27 PROCEDURE — 12001 RPR S/N/AX/GEN/TRNK 2.5CM/<: CPT | Performed by: PHYSICIAN ASSISTANT

## 2021-09-27 PROCEDURE — 99283 EMERGENCY DEPT VISIT LOW MDM: CPT

## 2021-09-27 PROCEDURE — 73140 X-RAY EXAM OF FINGER(S): CPT

## 2021-09-27 PROCEDURE — 90715 TDAP VACCINE 7 YRS/> IM: CPT | Performed by: PHYSICIAN ASSISTANT

## 2021-09-27 PROCEDURE — 99284 EMERGENCY DEPT VISIT MOD MDM: CPT | Performed by: PHYSICIAN ASSISTANT

## 2021-09-27 RX ORDER — BACITRACIN, NEOMYCIN, POLYMYXIN B 400; 3.5; 5 [USP'U]/G; MG/G; [USP'U]/G
1 OINTMENT TOPICAL ONCE
Status: COMPLETED | OUTPATIENT
Start: 2021-09-27 | End: 2021-09-27

## 2021-09-27 RX ORDER — CEPHALEXIN 250 MG/1
500 CAPSULE ORAL ONCE
Status: COMPLETED | OUTPATIENT
Start: 2021-09-27 | End: 2021-09-27

## 2021-09-27 RX ORDER — CEPHALEXIN 500 MG/1
500 CAPSULE ORAL EVERY 8 HOURS SCHEDULED
Qty: 21 CAPSULE | Refills: 0 | Status: SHIPPED | OUTPATIENT
Start: 2021-09-27 | End: 2021-10-04

## 2021-09-27 RX ORDER — IBUPROFEN 600 MG/1
600 TABLET ORAL EVERY 6 HOURS PRN
Qty: 30 TABLET | Refills: 0 | Status: SHIPPED | OUTPATIENT
Start: 2021-09-27 | End: 2022-03-11 | Stop reason: ALTCHOICE

## 2021-09-27 RX ORDER — BUPIVACAINE HYDROCHLORIDE 5 MG/ML
5 INJECTION, SOLUTION EPIDURAL; INTRACAUDAL ONCE
Status: COMPLETED | OUTPATIENT
Start: 2021-09-27 | End: 2021-09-27

## 2021-09-27 RX ORDER — OXYCODONE HYDROCHLORIDE AND ACETAMINOPHEN 5; 325 MG/1; MG/1
1 TABLET ORAL EVERY 8 HOURS PRN
Qty: 5 TABLET | Refills: 0 | Status: SHIPPED | OUTPATIENT
Start: 2021-09-27 | End: 2021-09-29

## 2021-09-27 RX ADMIN — BACITRACIN, NEOMYCIN, POLYMYXIN B 1 SMALL APPLICATION: 400; 3.5; 5 OINTMENT TOPICAL at 16:48

## 2021-09-27 RX ADMIN — CEPHALEXIN 500 MG: 250 CAPSULE ORAL at 16:48

## 2021-09-27 RX ADMIN — TETANUS TOXOID, REDUCED DIPHTHERIA TOXOID AND ACELLULAR PERTUSSIS VACCINE, ADSORBED 0.5 ML: 5; 2.5; 8; 8; 2.5 SUSPENSION INTRAMUSCULAR at 15:25

## 2021-09-27 RX ADMIN — BUPIVACAINE HYDROCHLORIDE 5 ML: 5 INJECTION, SOLUTION EPIDURAL; INTRACAUDAL; PERINEURAL at 15:29

## 2022-03-11 ENCOUNTER — APPOINTMENT (EMERGENCY)
Dept: CT IMAGING | Facility: HOSPITAL | Age: 47
End: 2022-03-11
Payer: MEDICARE

## 2022-03-11 ENCOUNTER — HOSPITAL ENCOUNTER (EMERGENCY)
Facility: HOSPITAL | Age: 47
End: 2022-03-14
Attending: EMERGENCY MEDICINE | Admitting: EMERGENCY MEDICINE
Payer: MEDICARE

## 2022-03-11 DIAGNOSIS — R79.89 LOW TSH LEVEL: ICD-10-CM

## 2022-03-11 DIAGNOSIS — F31.2 BIPOLAR I DISORDER, MOST RECENT EPISODE MANIC, SEVERE WITH PSYCHOTIC FEATURES (HCC): Primary | Chronic | ICD-10-CM

## 2022-03-11 LAB
ALBUMIN SERPL BCP-MCNC: 4.6 G/DL (ref 3.5–5)
ALP SERPL-CCNC: 67 U/L (ref 34–104)
ALT SERPL W P-5'-P-CCNC: 41 U/L (ref 7–52)
AMPHETAMINES SERPL QL SCN: NEGATIVE
ANION GAP SERPL CALCULATED.3IONS-SCNC: 8 MMOL/L (ref 4–13)
AST SERPL W P-5'-P-CCNC: 73 U/L (ref 13–39)
BARBITURATES UR QL: NEGATIVE
BASOPHILS # BLD AUTO: 0.04 THOUSANDS/ΜL (ref 0–0.1)
BASOPHILS NFR BLD AUTO: 0 % (ref 0–1)
BENZODIAZ UR QL: NEGATIVE
BILIRUB SERPL-MCNC: 0.77 MG/DL (ref 0.2–1)
BUN SERPL-MCNC: 22 MG/DL (ref 5–25)
CALCIUM SERPL-MCNC: 9.4 MG/DL (ref 8.4–10.2)
CHLORIDE SERPL-SCNC: 103 MMOL/L (ref 96–108)
CO2 SERPL-SCNC: 26 MMOL/L (ref 21–32)
COCAINE UR QL: NEGATIVE
CREAT SERPL-MCNC: 0.88 MG/DL (ref 0.6–1.3)
EOSINOPHIL # BLD AUTO: 0.04 THOUSAND/ΜL (ref 0–0.61)
EOSINOPHIL NFR BLD AUTO: 0 % (ref 0–6)
ERYTHROCYTE [DISTWIDTH] IN BLOOD BY AUTOMATED COUNT: 12.7 % (ref 11.6–15.1)
ETHANOL EXG-MCNC: 0 MG/DL
FLUAV RNA RESP QL NAA+PROBE: NEGATIVE
FLUBV RNA RESP QL NAA+PROBE: NEGATIVE
GFR SERPL CREATININE-BSD FRML MDRD: 104 ML/MIN/1.73SQ M
GLUCOSE SERPL-MCNC: 166 MG/DL (ref 65–140)
HCT VFR BLD AUTO: 43.5 % (ref 36.5–49.3)
HGB BLD-MCNC: 15.4 G/DL (ref 12–17)
IMM GRANULOCYTES # BLD AUTO: 0.05 THOUSAND/UL (ref 0–0.2)
IMM GRANULOCYTES NFR BLD AUTO: 0 % (ref 0–2)
LYMPHOCYTES # BLD AUTO: 2 THOUSANDS/ΜL (ref 0.6–4.47)
LYMPHOCYTES NFR BLD AUTO: 14 % (ref 14–44)
MCH RBC QN AUTO: 31.3 PG (ref 26.8–34.3)
MCHC RBC AUTO-ENTMCNC: 35.4 G/DL (ref 31.4–37.4)
MCV RBC AUTO: 88 FL (ref 82–98)
METHADONE UR QL: NEGATIVE
MONOCYTES # BLD AUTO: 0.86 THOUSAND/ΜL (ref 0.17–1.22)
MONOCYTES NFR BLD AUTO: 6 % (ref 4–12)
NEUTROPHILS # BLD AUTO: 11.08 THOUSANDS/ΜL (ref 1.85–7.62)
NEUTS SEG NFR BLD AUTO: 80 % (ref 43–75)
NRBC BLD AUTO-RTO: 0 /100 WBCS
OPIATES UR QL SCN: NEGATIVE
OXYCODONE+OXYMORPHONE UR QL SCN: NEGATIVE
PCP UR QL: NEGATIVE
PLATELET # BLD AUTO: 247 THOUSANDS/UL (ref 149–390)
PMV BLD AUTO: 9.2 FL (ref 8.9–12.7)
POTASSIUM SERPL-SCNC: 4.3 MMOL/L (ref 3.5–5.3)
PROT SERPL-MCNC: 7.7 G/DL (ref 6.4–8.4)
RBC # BLD AUTO: 4.92 MILLION/UL (ref 3.88–5.62)
RSV RNA RESP QL NAA+PROBE: NEGATIVE
SARS-COV-2 RNA RESP QL NAA+PROBE: NEGATIVE
SODIUM SERPL-SCNC: 137 MMOL/L (ref 135–147)
THC UR QL: POSITIVE
TSH SERPL DL<=0.05 MIU/L-ACNC: 0.39 UIU/ML (ref 0.45–5.33)
WBC # BLD AUTO: 14.07 THOUSAND/UL (ref 4.31–10.16)

## 2022-03-11 PROCEDURE — 99285 EMERGENCY DEPT VISIT HI MDM: CPT | Performed by: EMERGENCY MEDICINE

## 2022-03-11 PROCEDURE — 0241U HB NFCT DS VIR RESP RNA 4 TRGT: CPT | Performed by: EMERGENCY MEDICINE

## 2022-03-11 PROCEDURE — 99285 EMERGENCY DEPT VISIT HI MDM: CPT

## 2022-03-11 PROCEDURE — 36415 COLL VENOUS BLD VENIPUNCTURE: CPT | Performed by: EMERGENCY MEDICINE

## 2022-03-11 PROCEDURE — 80307 DRUG TEST PRSMV CHEM ANLYZR: CPT | Performed by: EMERGENCY MEDICINE

## 2022-03-11 PROCEDURE — 82075 ASSAY OF BREATH ETHANOL: CPT | Performed by: EMERGENCY MEDICINE

## 2022-03-11 PROCEDURE — G1004 CDSM NDSC: HCPCS

## 2022-03-11 PROCEDURE — 99203 OFFICE O/P NEW LOW 30 MIN: CPT | Performed by: PSYCHIATRY & NEUROLOGY

## 2022-03-11 PROCEDURE — 70450 CT HEAD/BRAIN W/O DYE: CPT

## 2022-03-11 PROCEDURE — 85025 COMPLETE CBC W/AUTO DIFF WBC: CPT | Performed by: EMERGENCY MEDICINE

## 2022-03-11 PROCEDURE — 80053 COMPREHEN METABOLIC PANEL: CPT | Performed by: EMERGENCY MEDICINE

## 2022-03-11 PROCEDURE — 84443 ASSAY THYROID STIM HORMONE: CPT | Performed by: EMERGENCY MEDICINE

## 2022-03-11 RX ORDER — OLANZAPINE 5 MG/1
10 TABLET, ORALLY DISINTEGRATING ORAL DAILY
Status: DISCONTINUED | OUTPATIENT
Start: 2022-03-12 | End: 2022-03-11

## 2022-03-11 RX ORDER — OLANZAPINE 5 MG/1
10 TABLET, ORALLY DISINTEGRATING ORAL DAILY
Status: DISCONTINUED | OUTPATIENT
Start: 2022-03-11 | End: 2022-03-14 | Stop reason: HOSPADM

## 2022-03-11 RX ORDER — LORAZEPAM 1 MG/1
2 TABLET ORAL ONCE
Status: COMPLETED | OUTPATIENT
Start: 2022-03-11 | End: 2022-03-11

## 2022-03-11 RX ADMIN — OLANZAPINE 10 MG: 5 TABLET, ORALLY DISINTEGRATING ORAL at 18:36

## 2022-03-11 RX ADMIN — LORAZEPAM 2 MG: 1 TABLET ORAL at 13:45
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
## 2022-03-11 NOTE — ED NOTES
Pt sleeping at this time with easy respirations, will continue to monitor       Maynor Means  03/11/22 5944
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
## 2022-03-11 NOTE — ED NOTES
Assumed watch of pt  Pt laying on stretcher at this time with eyes closed and easy respirations  Will continue to monitor       Christine Oro  03/11/22 3059

## 2022-03-11 NOTE — ED NOTES
Patient is a 42 y o, male with reported past psychiatric h/o bipolar disorder, PTSD, ETOC abuse, brought to the ED by EMS on delegated 302 petitioned by patient's brother, Floridalma Moreau  Per 302, patient has h/o numerous inpatient hospitalizations; currently not been taking any psychiatric medications; not sleeping; walking up to is [facility] homes while making loud noises and gestures and wearing a pillow on his head, and barefoot; paranoid and delusional, believing that people are out to kill him; patient is decompensated, a danger to self/ others, and unable to care for self due to his mental illness and in need of further evaluation  Patient medicated with Ativan in ED  Upon assessment patient presented anxious, irritable, manic  His speech was rapid, pressured and loud  He would intermittently belch loudly and pace  Patient was easily distracted with poor concentration  Patient was paranoid  He accused his brother and a previous girlfriend of setting him up  Says that his brother has cameras that are always watching and recording him  Also accused this worker of looking like the Gregoria Sandoval for his pre trial services and demanded that this writer show him his "badge" to identify himself after this writer verbally identified himself multiple times to patient  Patient denied any SI/HI/AVH  Patient is possibly hearing voices and responding to internal stimuli  Reported he is eating and sleeping normal  Patient reported to not taking any medications or following up with any outpatient psychiatry care  Patient does not believe he needs any medications  Stated he was forced to take "2000mg of depakote" in the past  Patient reported he works and stays at a house owned by his brother  Patient reportedly is unable to return and is homeless  His brother reportedly changed the locks due to patient history of damaging property  Patient reported he smokes marijuana, but not today  He denied any additional drug or ETOH use   BAT 0 00 in ED  UDS is pending  Patient reportedly quit his job 2 days ago and had a recent break up with girlfriend per brother  Patient stated he has had pre trial services for the past 2 years and does not know when his hearing is scheduled  Patient is paranoid and delusional  Poor judgement, insight  No aggression in ED, verbally redirectable at present  EMR indicated patient was committed in September 2018 for similar symptoms and was  treated at [facility]  Patient unable to care for himself  Patient refused to sign a 201 after treatment options were explained  YOSELIN Griffin upheld 302  Patient given/served his rights  He does not appear to understand his rights   Intake assessment completed, Safety Risk Assessment completed    Faxed completed 302 and ACT 77 to [facility]

## 2022-03-11 NOTE — ED NOTES
Pt told they have to provide urine sample  Pt unable to provide sample at this time  Will continue to monitor        Antonio Chau  03/11/22 0751

## 2022-03-11 NOTE — ED NOTES
Sadia Verma, pt's friend, called would like pt to call her  [phone]         Melanie Bull RN  03/11/22 0393

## 2022-03-11 NOTE — ED NOTES
Pt unable to stay still  Pt opened shower gel and rubbed it on his head for about 2 minutes without rinsing shower gel off  Pt then ran his hands under water for a few minutes without washing them just stared at hands under water  Pt also walked up to computer trying to read what was on screen  Explained to pt that he can not read what is on screen to protect all pt privacy  Pt looked a little agitated after I explained  Pt then expressed how hot it was in the room and tried to move stretcher on his own and was being very loud  This tech walked into room and informed pt he is not to move stretcher on his own  Pt seems a little irritable at the moment talking to nurse about why he shouldn't take the olanzapine  Pt is reluctant but after about 5 minutes of pt rambling he took the medication  Pt now laying on stretcher watching tv with easy respiration  Will continue to monitor         Maynor Means  03/11/22 2719

## 2022-03-11 NOTE — ED NOTES
Assumed 1:1 of pt at the time  Pt is sitting on stretcher talking loudly to staff member(s)  Pt is currently not behaving in a threatening manor towards staff but occasionally curses in conversation but is speaking loudly and rambling with conversation  Will continue to monitor         Caro Ax  03/11/22 8282

## 2022-03-11 NOTE — ED NOTES
Pt talking with Psych consult at this time  Will continue to monitor       Maynor Means  03/11/22 0958

## 2022-03-11 NOTE — ED NOTES
Pt ambulated to shower with [facility] at this time  Will continue to monitor       Tushar Kim  03/11/22 5337

## 2022-03-11 NOTE — ED NOTES
Pt given phone to call friend Tomás Viveros  Was advised to take phone if pt is agitated  Pt otp now will continue to monitor          Loretta Vukimmie  03/11/22 6698

## 2022-03-12 RX ORDER — LORAZEPAM 1 MG/1
2 TABLET ORAL ONCE
Status: COMPLETED | OUTPATIENT
Start: 2022-03-12 | End: 2022-03-13

## 2022-03-12 RX ADMIN — OLANZAPINE 10 MG: 5 TABLET, ORALLY DISINTEGRATING ORAL at 08:21

## 2022-03-12 NOTE — ED NOTES
Comfort Note    Pt has taken shower & performed oral care; pt bed sheet, pillow cases, & blankets switched out with fresh supply       John Mccain  03/12/22 4471

## 2022-03-12 NOTE — ED NOTES
Pt's mother called requesting to speak speak with pt  However pt is sleeping  Will inform pt in morning       Rodrigo Juarez  03/11/22 2101

## 2022-03-12 NOTE — ED NOTES
Patient is accepted at [facility]  Patient is accepted by Dr Nelida Saldana per [address] [address] is arranged with [facility] is scheduled for **  TBD  Patient may go to the floor at **  Saturday after 1930 pm        Nurse report is to be called to [phone] prior to patient transfer

## 2022-03-12 NOTE — ED NOTES
Hourly Note    Pt is quiet, awake, and pleasant; pt reading newspaper; will continue to monitor        [place] Epp  03/12/22 140

## 2022-03-12 NOTE — ED NOTES
Spoke to Pinky Cooley at [facility] to give transport specifics:  Not appropriate for C[facility], going to [place] and cannot arrive before 1930 on Saturday  Pinky Cooley advised she will call with time

## 2022-03-12 NOTE — ED NOTES
Nourishment Note    Pt has received lunch tray; pt currently eating       Montse Zamudion  03/12/22 5603

## 2022-03-12 NOTE — ED NOTES
Bed Search    Suzan:  No beds for today or tomorrow per Juliet Aguilar:  No male beds per Joycelyn Care:  No beds per Rona Marcus  First:  Fax clinical per Gabriel Hanna:  [facility]  Nothing now but try overnight per [facility]:  No appropriate beds per Zeke Cunningham:  Fax clinical per Zeny Race:   Full for the weekend per Guillermina  PPI:  No admits this weekend per Kimberly Ríos:  No availability before Monday per [facility]    302 faxed to Intake
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
## 2022-03-12 NOTE — ED CARE HANDOFF
Emergency Department Sign Out Note        Signout and transfer of care from my colleague, Dr Joan Escalante  See Separate Emergency Department note  The patient, Adrienne Akhtar, was evaluated by the previous provider for bizarre behavior  Labs Reviewed   RAPID DRUG SCREEN, URINE - Abnormal       Result Value Ref Range Status    Amph/Meth UR Negative  Negative Final    Barbiturate Ur Negative  Negative Final    Benzodiazepine Urine Negative  Negative Final    Cocaine Urine Negative  Negative Final    Methadone Urine Negative  Negative Final    Opiate Urine Negative  Negative Final    PCP Ur Negative  Negative Final    THC Urine Positive (*) Negative Final    Oxycodone Urine Negative  Negative Final    Narrative:     Presumptive report  If requested, specimen will be sent to reference lab for confirmation  FOR MEDICAL PURPOSES ONLY  IF CONFIRMATION NEEDED PLEASE CONTACT THE LAB WITHIN 5 DAYS      Drug Screen Cutoff Levels:  AMPHETAMINE/METHAMPHETAMINES  1000 ng/mL  BARBITURATES     200 ng/mL  BENZODIAZEPINES     200 ng/mL  COCAINE      300 ng/mL  METHADONE      300 ng/mL  OPIATES      300 ng/mL  PHENCYCLIDINE     25 ng/mL  THC       50 ng/mL  OXYCODONE      100 ng/mL   CBC AND DIFFERENTIAL - Abnormal    WBC 14 07 (*) 4 31 - 10 16 Thousand/uL Final    RBC 4 92  3 88 - 5 62 Million/uL Final    Hemoglobin 15 4  12 0 - 17 0 g/dL Final    Hematocrit 43 5  36 5 - 49 3 % Final    MCV 88  82 - 98 fL Final    MCH 31 3  26 8 - 34 3 pg Final    MCHC 35 4  31 4 - 37 4 g/dL Final    RDW 12 7  11 6 - 15 1 % Final    MPV 9 2  8 9 - 12 7 fL Final    Platelets 596  498 - 390 Thousands/uL Final    nRBC 0  /100 WBCs Final    Neutrophils Relative 80 (*) 43 - 75 % Final    Immat GRANS % 0  0 - 2 % Final    Lymphocytes Relative 14  14 - 44 % Final    Monocytes Relative 6  4 - 12 % Final    Eosinophils Relative 0  0 - 6 % Final    Basophils Relative 0  0 - 1 % Final    Neutrophils Absolute 11 08 (*) 1 85 - 7 62 Thousands/µL Final Immature Grans Absolute 0 05  0 00 - 0 20 Thousand/uL Final    Lymphocytes Absolute 2 00  0 60 - 4 47 Thousands/µL Final    Monocytes Absolute 0 86  0 17 - 1 22 Thousand/µL Final    Eosinophils Absolute 0 04  0 00 - 0 61 Thousand/µL Final    Basophils Absolute 0 04  0 00 - 0 10 Thousands/µL Final   COMPREHENSIVE METABOLIC PANEL - Abnormal    Sodium 137  135 - 147 mmol/L Final    Potassium 4 3  3 5 - 5 3 mmol/L Final    Chloride 103  96 - 108 mmol/L Final    CO2 26  21 - 32 mmol/L Final    ANION GAP 8  4 - 13 mmol/L Final    BUN 22  5 - 25 mg/dL Final    Creatinine 0 88  0 60 - 1 30 mg/dL Final    Comment: Standardized to IDMS reference method    Glucose 166 (*) 65 - 140 mg/dL Final    Comment: If the patient is fasting, the ADA then defines impaired fasting glucose as > 100 mg/dL and diabetes as > or equal to 123 mg/dL  Specimen collection should occur prior to Sulfasalazine administration due to the potential for falsely depressed results  Specimen collection should occur prior to Sulfapyridine administration due to the potential for falsely elevated results  Calcium 9 4  8 4 - 10 2 mg/dL Final    AST 73 (*) 13 - 39 U/L Final    Comment: Specimen collection should occur prior to Sulfasalazine administration due to the potential for falsely depressed results  ALT 41  7 - 52 U/L Final    Comment: Specimen collection should occur prior to Sulfasalazine administration due to the potential for falsely depressed results       Alkaline Phosphatase 67  34 - 104 U/L Final    Total Protein 7 7  6 4 - 8 4 g/dL Final    Albumin 4 6  3 5 - 5 0 g/dL Final    Total Bilirubin 0 77  0 20 - 1 00 mg/dL Final    eGFR 104  ml/min/1 73sq m Final    Narrative:     [place] guidelines for Chronic Kidney Disease (CKD):     Stage 1 with normal or high GFR (GFR > 90 mL/min/1 73 square meters)    Stage 2 Mild CKD (GFR = 60-89 mL/min/1 73 square meters)    Stage 3A Moderate CKD (GFR = 45-59 mL/min/1 73 square meters)    Stage 3B Moderate CKD (GFR = 30-44 mL/min/1 73 square meters)    Stage 4 Severe CKD (GFR = 15-29 mL/min/1 73 square meters)    Stage 5 End Stage CKD (GFR <15 mL/min/1 73 square meters)  Note: GFR calculation is accurate only with a steady state creatinine   TSH, 3RD GENERATION - Abnormal    TSH 3RD GENERATON 0 392 (*) 0 450 - 5 330 uIU/mL Final    Narrative:     Patients undergoing fluorescein dye angiography may retain small amounts of fluorescein in the body for 48-72 hours post procedure  Samples containing fluorescein can produce falsely depressed TSH values  If the patient had this procedure,a specimen should be resubmitted post fluorescein clearance  COVID19, INFLUENZA A/B, RSV PCR, [facility] - Normal    SARS-CoV-2 Negative  Negative Final    INFLUENZA A PCR Negative  Negative Final    INFLUENZA B PCR Negative  Negative Final    RSV PCR Negative  Negative Final    Narrative:     FOR PEDIATRIC PATIENTS - copy/paste COVID Guidelines URL to browser: [email or web address]/  [employer]    SARS-CoV-2 assay is a Nucleic Acid Amplification assay intended for the  qualitative detection of nucleic acid from SARS-CoV-2 in nasopharyngeal  swabs  Results are for the presumptive identification of SARS-CoV-2 RNA  Positive results are indicative of infection with SARS-CoV-2, the virus  causing COVID-19, but do not rule out bacterial infection or co-infection  with other viruses  Laboratories within the [place] and its  territories are required to report all positive results to the appropriate  public health authorities  Negative results do not preclude SARS-CoV-2  infection and should not be used as the sole basis for treatment or other  patient management decisions  Negative results must be combined with  clinical observations, patient history, and epidemiological information  This test has not been FDA cleared or approved      This test has been authorized by FDA under an Emergency Use Authorization  (EUA)  This test is only authorized for the duration of time the  declaration that circumstances exist justifying the authorization of the  emergency use of an in vitro diagnostic tests for detection of SARS-CoV-2  virus and/or diagnosis of COVID-19 infection under section 564(b)(1) of  the Act, 21 U  S C  637QBU-8(F)(4), unless the authorization is terminated  or revoked sooner  The test has been validated but independent review by FDA  and CLIA is pending  Test performed using [employer] Gene[employer]pert: This RT-PCR assay targets N2,  a region unique to SARS-CoV-2  A conserved region in the E-gene was chosen  for pan-Sarbecovirus detection which includes SARS-CoV-2  POCT ALCOHOL BREATH TEST - Normal    EXTBreath Alcohol 0 00   Final     Patient is medically cleared, on bed search for psychiatric admission  No acute events during shift  Patient is to be signed out to my colleague at change of shift, on bed search for psychiatric admission  ED Course as of 03/12/22 0614   Fri Mar 11, 2022   2007 CT BRAIN - WITHOUT CONTRAST     INDICATION:   Mental status change, unknown cause  Mental status change, persistent or worsening  mental status changes, potentially psychiatric, r/o organic/trauma;       COMPARISON:  None      TECHNIQUE:  CT examination of the brain was performed  In addition to axial images, sagittal and coronal 2D reformatted images were created and submitted for interpretation      Radiation dose length product (DLP) for this visit:  917 mGy-cm   This examination, like all CT scans performed in the [facility], was performed utilizing techniques to minimize radiation dose exposure, including the use of iterative   reconstruction and automated exposure control        IMAGE QUALITY:  Diagnostic      FINDINGS:     PARENCHYMA:  No intracranial mass, mass effect or midline shift  No CT signs of acute infarction    No acute parenchymal hemorrhage      VENTRICLES AND EXTRA-AXIAL SPACES:  Normal for the patient's age      VISUALIZED ORBITS AND PARANASAL SINUSES:  No acute abnormality involving the orbits  Mild scattered sinus mucosal thickening is noted  No fluid levels are seen      CALVARIUM AND EXTRACRANIAL SOFT TISSUES:  Normal      IMPRESSION:     No acute intracranial abnormality  Sat Mar 12, 2022   0541 CT head without contrast     Procedures  MDM        Disposition  Final diagnoses:   Bipolar I disorder, most recent episode manic, severe with psychotic features ([facility] [address] )   Low TSH level     Time reflects when diagnosis was documented in both MDM as applicable and the Disposition within this note     Time User Action Codes Description Comment    3/11/2022  3:34 PM Nicholas Higginbotham Add [F31 2] Bipolar I disorder, most recent episode manic, severe with psychotic features ([facility] [address] )     3/12/2022 12:58 AM Brandy Galicia Add [R79 89] Low TSH level       ED Disposition     ED Disposition Condition Date/Time Comment    Transfer to [facility]  Fri Mar 11, 2022  3:36 PM Ivar Face should be transferred out to d and has been medically cleared          MD Documentation      Most Recent Value   Patient Condition The patient has been stabilized such that within reasonable medical probability, no material deterioration of the patient condition or the condition of the unborn child(rey) is likely to result from the transfer   Reason for Transfer Level of Care needed not available at this facility  [facility]]   Benefits of Transfer Specialized equipment and/or services available at the receiving facility (Include comment)________________________  [BHU]   Risks of Transfer Potential for delay in receiving treatment   Accepting Physician Dyllan Nunez [place]    (Name & Tel number) Shellie Acosta   Transported by [facility] and Unit #) Radha Coon Sending MD Dr Mena Figures   Provider Certification General risk, such as traffic hazards, adverse weather conditions, rough terrain or turbulence, possible failure of equipment (including vehicle or aircraft), or consequences of actions of persons outside the control of the transport personnel      RN Documentation      Most [facility] Name, [place]    (Name & Tel number) Jigar Greenberg   Transported by [employer] and Unit #) [facility]      Follow-up Information    None       Patient's Medications   Discharge Prescriptions    No medications on file     No discharge procedures on file         ED Provider  Electronically Signed by     Adama Byrne MD  03/11/22 2006       Adama Byrne MD  03/12/22 0674       Adama Byrne MD  03/12/22 5020

## 2022-03-12 NOTE — ED NOTES
Spoke with Tod Seals from [facility] regarding transport time, [facility] still working on     [employer], [place]  03/12/22 4869

## 2022-03-12 NOTE — ED NOTES
Nourishment Note    Pt has received breakfast tray; pt currently eating        Tyler Beltre  03/12/22 1803

## 2022-03-12 NOTE — ED NOTES
Hourly Note    Pt is quiet, awake, and pleasant; pt laying in bed eating snacks; will continue to monitor        Augie Mims  03/12/22 1201

## 2022-03-12 NOTE — ED NOTES
Hourly Note    Pt is quiet, awake, and pleasant; pt reading newspaper while humming along to music from T V; will continue to monitor        [facility]  03/12/22 3641

## 2022-03-12 NOTE — ED NOTES
Hourly Note    Pt is quiet, awake, and pleasant; pt laying in bed & watching T V; will continue to monitor        Tavon Loud  03/12/22 8351

## 2022-03-12 NOTE — ED CARE HANDOFF
Emergency Department Sign Out Note        Sign out and transfer of care from Dr Tiffany Kowalski  See Separate Emergency Department note  The patient, Josselin Corona, was evaluated by the previous provider for bipolar disorder    Workup Completed: All work up completed  ED Course / Workup Pending (followup):  Pt is loud and has period of agitation  Pt started on zyprexa  Pt waiting for bed search to be admitted to behavioral health  Procedures  MDM        Disposition  Final diagnoses:   Bipolar I disorder, most recent episode manic, severe with psychotic features ([facility] [address] )   Low TSH level     Time reflects when diagnosis was documented in both MDM as applicable and the Disposition within this note     Time User Action Codes Description Comment    3/11/2022  3:34 PM Jeanette Carranza Add [F31 2] Bipolar I disorder, most recent episode manic, severe with psychotic features ([facility] [address] )     3/12/2022 12:58 AM Matthew Ramos Add [R79 89] Low TSH level       ED Disposition     ED Disposition Condition Date/Time Comment    Transfer to [facility]  Fri Mar 11, 2022  3:36 PM Josselin Corona should be transferred out to [facility] and has been medically cleared          MD Documentation      Most Recent Value   Patient Condition The patient has been stabilized such that within reasonable medical probability, no material deterioration of the patient condition or the condition of the unborn child(rye) is likely to result from the transfer   Reason for Transfer Level of Care needed not available at this facility  [facility]]   Benefits of Transfer Specialized equipment and/or services available at the receiving facility (Include comment)________________________  [BHU]   Risks of Transfer Potential for delay in receiving treatment   Accepting Physician Dr Chalino Martinez, [place]    (Name & Tel number) Joesph Dunham Transported by [employer] and Unit #) Meryle Ligas Sending MD Dr Vernell Gelinas   Provider Certification General risk, such as traffic hazards, adverse weather conditions, rough terrain or turbulence, possible failure of equipment (including vehicle or aircraft), or consequences of actions of persons outside the control of the transport personnel      RN Documentation      Most [facility] Name, [facility] [place]    (Name & Tel number) Vidya Noss   Transported by [employer] and Unit #) [facility]      Follow-up Information    None       Patient's Medications   Discharge Prescriptions    No medications on file     No discharge procedures on file         ED Provider  Electronically Signed by     Arie Real MD  03/14/22 5658

## 2022-03-12 NOTE — ED NOTES
Hourly Note    Pt is alert, awake, restless, & pleasant; pt is eating snacks & watching T V; will continue to monitor        Orlena Factor  03/12/22 4520

## 2022-03-12 NOTE — ED NOTES
Insurance Authorization for admission:   Phone call placed to [employer]  Phone number: [phone]  Spoke to [employer]     4 days approved  Level of care: involuntary BH  Review on ** TBD   Authorization # **  Facility to call when patient arrives        PROMISe:  Tianna Singleton0

## 2022-03-12 NOTE — ED NOTES
Cw called and notified [facility] about transport time for Monday morning at 8am

## 2022-03-12 NOTE — ED NOTES
Hourly Note    Pt is quiet, awake, and pleasant; pt eating breakfast & watching T V; will continue to monitor        Shanti Roselyn  03/12/22 3808

## 2022-03-13 VITALS
OXYGEN SATURATION: 99 % | RESPIRATION RATE: 18 BRPM | WEIGHT: 211.64 LBS | SYSTOLIC BLOOD PRESSURE: 145 MMHG | HEIGHT: 69 IN | HEART RATE: 73 BPM | DIASTOLIC BLOOD PRESSURE: 76 MMHG | BODY MASS INDEX: 31.35 KG/M2 | TEMPERATURE: 97.6 F

## 2022-03-13 RX ORDER — LORAZEPAM 1 MG/1
2 TABLET ORAL ONCE
Status: COMPLETED | OUTPATIENT
Start: 2022-03-13 | End: 2022-03-13

## 2022-03-13 RX ADMIN — LORAZEPAM 2 MG: 1 TABLET ORAL at 23:26

## 2022-03-13 RX ADMIN — LORAZEPAM 2 MG: 1 TABLET ORAL at 00:00

## 2022-03-13 RX ADMIN — OLANZAPINE 10 MG: 5 TABLET, ORALLY DISINTEGRATING ORAL at 08:11

## 2022-03-13 NOTE — ED NOTES
Pt ambulated to restroom and provided with a fresh water, orange juice, pretzels, and a breakfast sandwich       Candelario Sacks  03/13/22 5219 palpation

## 2022-03-13 NOTE — ED NOTES
Hourly Note    Pt is quiet, awake, and pleasant; pt watching T V; will continue to monitor        Dakota Gage  03/13/22 5591

## 2022-03-13 NOTE — ED NOTES
Hourly Note    Pt is alert, awake, and pleasant; pt talking on phone with mother; will continue to monitor        Nicole Ramirez  03/13/22 4887

## 2022-03-13 NOTE — ED NOTES
Hourly Note    Pt is quiet, awake, and pleasant; pt watching T V while eating snacks; will continue to monitor        Rafakorina Gabby  03/13/22 5329

## 2022-03-13 NOTE — ED NOTES
Nourishment Note    Pt has received breakfast tray; pt currently eating        Levada Gabby  03/13/22 0956

## 2022-03-13 NOTE — ED NOTES
Hourly Note    Pt is quiet, awake, and pleasant; pt watching T V; will continue to monitor        Alberto Shannon  03/13/22 3536

## 2022-03-13 NOTE — ED NOTES
Nourishment Note    Pt has received dinner tray; pt currently eating        Montse Zamudion  03/13/22 3602

## 2022-03-13 NOTE — ED NOTES
Hourly Note    Pt is quiet, awake, and pleasant; pt on phone with Aunt; will continue to monitor        Fernie Echevarria  03/13/22 0351

## 2022-03-13 NOTE — ED NOTES
Nourishment Note    Pt has received lunch tray; pt currently eating; will continue to monitor        Andriy Atwood  03/13/22 5198

## 2022-03-13 NOTE — ED NOTES
Hourly Note    Pt is quiet, awake, and pleasant; pt on phone with Mother; will continue to monitor        Prasanna Lewismichael  03/13/22 0120

## 2022-03-13 NOTE — ED NOTES
Hourly Note    Pt is quiet, awake, and pleasant; pt on phone with Mother; will continue to monitor        Misty Serrano  03/13/22 1101

## 2022-03-13 NOTE — ED NOTES
Hourly Note    Pt is quiet, awake, and pleasant; pt washing face in sink /w soap & wash cloth; will continue to monitor        Feliciano Evans  03/13/22 8630

## 2022-03-13 NOTE — ED NOTES
Hourly Note    Pt is quiet, awake, and pleasant; on phone /w Son; will continue to monitor        Ann Marie Crane  03/13/22 1272

## 2022-03-13 NOTE — ED NOTES
Pt ambulated to the restroom and provided with pretzels and a peanut butter and jelly sandwich as requested       Bri Tian  03/13/22 1711 Zabrina Major  03/13/22 0035

## 2022-03-13 NOTE — ED NOTES
Hourly Note    Pt is alert, awake, & pleasant; pt doing pushups; will continue to monitor        Montse Farias  03/13/22 6702

## 2022-03-13 NOTE — ED NOTES
Hourly Note    Pt is quiet, awake, and pleasant; pt watching T V; will continue to monitor        Dakota Gage  03/13/22 6736

## 2022-03-14 NOTE — ED NOTES
KYRA needed, patient leaving to [address] [place] [address] transport time for Monday morning at 8am

## 2022-03-14 NOTE — ED NOTES
Pt care taken over at this time and pt walking around room 1:1 continued for pt safety      Aryan Beaverton, RADHA  03/14/22 0783

## 2022-03-14 NOTE — EMTALA/ACUTE CARE TRANSFER
[facility] EMERGENCY DEPARTMENT  [address] [facility] [address]  Dept: [phone]      EMTALA TRANSFER CONSENT    NAME Kathy Clarke                                         1975                              MRN [id number]    I have been informed of my rights regarding examination, treatment, and transfer   by Dr Jennifer Castle DO    Benefits: Specialized equipment and/or services available at the receiving facility (Include comment)________________________ (Inpatient psych)    Risks: Potential for delay in receiving treatment,Potential deterioration of medical condition,Increased discomfort during transfer,Possible worsening of condition or death during transfer      Consent for Transfer:  I acknowledge that my medical condition has been evaluated and explained to me by the emergency department physician or other qualified medical person and/or my attending physician, who has recommended that I be transferred to the service of  Accepting Physician: Inga Craig at [address] Name, [address] : [place]  The above potential benefits of such transfer, the potential risks associated with such transfer, and the probable risks of not being transferred have been explained to me, and I fully understand them  The doctor has explained that, in my case, the benefits of transfer outweigh the risks  I agree to be transferred  I authorize the performance of emergency medical procedures and treatments upon me in both transit and upon arrival at the receiving facility  Additionally, I authorize the release of any and all medical records to the receiving facility and request they be transported with me, if possible  I understand that the safest mode of transportation during a medical emergency is an ambulance and that the Hospital advocates the use of this mode of transport   Risks of traveling to the receiving facility by car, including absence of medical control, life sustaining equipment, such as oxygen, and medical personnel has been explained to me and I fully understand them  (YESSI CORRECT BOX BELOW)  [  ]  I consent to the stated transfer and to be transported by ambulance/helicopter  [  ]  I consent to the stated transfer, but refuse transportation by ambulance and accept full responsibility for my transportation by car  I understand the risks of non-ambulance transfers and I exonerate the Hospital and its staff from any deterioration in my condition that results from this refusal     X___________________________________________    DATE  22  TIME________  Signature of patient or legally responsible individual signing on patient behalf           RELATIONSHIP TO PATIENT_________________________          Provider Certification    NAME Jarocho Stafford                                         1975                              MRN [id number]    A medical screening exam was performed on the above named patient  Based on the examination:    Condition Necessitating Transfer The primary encounter diagnosis was Bipolar I disorder, most recent episode manic, severe with psychotic features ([facility] [address] )  A diagnosis of Low TSH level was also pertinent to this visit      Patient Condition: The patient has been stabilized such that within reasonable medical probability, no material deterioration of the patient condition or the condition of the unborn child(rey) is likely to result from the transfer    Reason for Transfer: No bed available at level of patient's needs    Transfer Requirements: Facility [facility] available and qualified personnel available for treatment as acknowledged by [employer]  · Agreed to accept transfer and to provide appropriate medical treatment as acknowledged by       Kenneth  · Appropriate medical records of the examination and treatment of the patient are provided at the time of transfer   [address] _______  · Transfer will be performed by qualified personnel from [address]  and appropriate transfer equipment as required, including the use of necessary and appropriate life support measures  Provider Certification: I have examined the patient and explained the following risks and benefits of being transferred/refusing transfer to the patient/family:  General risk, such as traffic hazards, adverse weather conditions, rough terrain or turbulence, possible failure of equipment (including vehicle or aircraft), or consequences of actions of persons outside the control of the transport personnel,Unanticipated needs of medical equipment and personnel during transport,Risk of worsening condition      Based on these reasonable risks and benefits to the patient and/or the unborn child(rey), and based upon the information available at the time of the patients examination, I certify that the medical benefits reasonably to be expected from the provision of appropriate medical treatments at another medical facility outweigh the increasing risks, if any, to the individuals medical condition, and in the case of labor to the unborn child, from effecting the transfer      X____________________________________________ DATE 03/14/22        TIME_______      ORIGINAL - SEND TO MEDICAL RECORDS   COPY - SEND WITH PATIENT DURING TRANSFER

## 2022-04-09 ENCOUNTER — HOSPITAL ENCOUNTER (EMERGENCY)
Facility: HOSPITAL | Age: 47
Discharge: HOME | End: 2022-04-09
Attending: EMERGENCY MEDICINE | Admitting: EMERGENCY MEDICINE
Payer: MEDICARE

## 2022-04-09 VITALS
WEIGHT: 212 LBS | DIASTOLIC BLOOD PRESSURE: 91 MMHG | HEIGHT: 69 IN | SYSTOLIC BLOOD PRESSURE: 139 MMHG | HEART RATE: 100 BPM | TEMPERATURE: 97.7 F | OXYGEN SATURATION: 96 % | BODY MASS INDEX: 31.4 KG/M2 | RESPIRATION RATE: 20 BRPM

## 2022-04-09 DIAGNOSIS — F31.9 BIPOLAR DISORDER (HCC): Primary | ICD-10-CM

## 2022-04-09 DIAGNOSIS — Z00.8 ENCOUNTER FOR PSYCHOLOGICAL EVALUATION: ICD-10-CM

## 2022-04-09 LAB
ALBUMIN SERPL BCP-MCNC: 3.8 G/DL (ref 3.5–5)
ALP SERPL-CCNC: 84 U/L (ref 46–116)
ALT SERPL W P-5'-P-CCNC: 62 U/L (ref 12–78)
AMPHETAMINES SERPL QL SCN: NEGATIVE
ANION GAP SERPL CALCULATED.3IONS-SCNC: 10 MMOL/L (ref 4–13)
APAP SERPL-MCNC: <2 UG/ML (ref 10–20)
AST SERPL W P-5'-P-CCNC: 63 U/L (ref 5–45)
BARBITURATES UR QL: NEGATIVE
BASOPHILS # BLD AUTO: 0.04 THOUSANDS/ΜL (ref 0–0.1)
BASOPHILS NFR BLD AUTO: 0 % (ref 0–1)
BENZODIAZ UR QL: NEGATIVE
BILIRUB SERPL-MCNC: 0.46 MG/DL (ref 0.2–1)
BUN SERPL-MCNC: 15 MG/DL (ref 5–25)
CALCIUM SERPL-MCNC: 8.6 MG/DL (ref 8.3–10.1)
CHLORIDE SERPL-SCNC: 105 MMOL/L (ref 100–108)
CO2 SERPL-SCNC: 25 MMOL/L (ref 21–32)
COCAINE UR QL: NEGATIVE
CREAT SERPL-MCNC: 0.74 MG/DL (ref 0.6–1.3)
EOSINOPHIL # BLD AUTO: 0.14 THOUSAND/ΜL (ref 0–0.61)
EOSINOPHIL NFR BLD AUTO: 1 % (ref 0–6)
ERYTHROCYTE [DISTWIDTH] IN BLOOD BY AUTOMATED COUNT: 13 % (ref 11.6–15.1)
ETHANOL SERPL-MCNC: <3 MG/DL (ref 0–3)
FLUAV RNA RESP QL NAA+PROBE: NEGATIVE
FLUBV RNA RESP QL NAA+PROBE: NEGATIVE
GFR SERPL CREATININE-BSD FRML MDRD: 112 ML/MIN/1.73SQ M
GLUCOSE SERPL-MCNC: 166 MG/DL (ref 65–140)
HCT VFR BLD AUTO: 44.2 % (ref 36.5–49.3)
HGB BLD-MCNC: 15.4 G/DL (ref 12–17)
IMM GRANULOCYTES # BLD AUTO: 0.04 THOUSAND/UL (ref 0–0.2)
IMM GRANULOCYTES NFR BLD AUTO: 0 % (ref 0–2)
LYMPHOCYTES # BLD AUTO: 2.54 THOUSANDS/ΜL (ref 0.6–4.47)
LYMPHOCYTES NFR BLD AUTO: 24 % (ref 14–44)
MCH RBC QN AUTO: 31.8 PG (ref 26.8–34.3)
MCHC RBC AUTO-ENTMCNC: 34.8 G/DL (ref 31.4–37.4)
MCV RBC AUTO: 91 FL (ref 82–98)
METHADONE UR QL: NEGATIVE
MONOCYTES # BLD AUTO: 1.27 THOUSAND/ΜL (ref 0.17–1.22)
MONOCYTES NFR BLD AUTO: 12 % (ref 4–12)
NEUTROPHILS # BLD AUTO: 6.58 THOUSANDS/ΜL (ref 1.85–7.62)
NEUTS SEG NFR BLD AUTO: 63 % (ref 43–75)
NRBC BLD AUTO-RTO: 0 /100 WBCS
OPIATES UR QL SCN: NEGATIVE
OXYCODONE+OXYMORPHONE UR QL SCN: NEGATIVE
PCP UR QL: NEGATIVE
PLATELET # BLD AUTO: 315 THOUSANDS/UL (ref 149–390)
PMV BLD AUTO: 8.7 FL (ref 8.9–12.7)
POTASSIUM SERPL-SCNC: 3.7 MMOL/L (ref 3.5–5.3)
PROT SERPL-MCNC: 7 G/DL (ref 6.4–8.2)
RBC # BLD AUTO: 4.85 MILLION/UL (ref 3.88–5.62)
RSV RNA RESP QL NAA+PROBE: NEGATIVE
SALICYLATES SERPL-MCNC: <3 MG/DL (ref 3–20)
SARS-COV-2 RNA RESP QL NAA+PROBE: NEGATIVE
SODIUM SERPL-SCNC: 140 MMOL/L (ref 136–145)
THC UR QL: POSITIVE
TSH SERPL DL<=0.05 MIU/L-ACNC: 0.66 UIU/ML (ref 0.45–4.5)
WBC # BLD AUTO: 10.61 THOUSAND/UL (ref 4.31–10.16)

## 2022-04-09 PROCEDURE — 36415 COLL VENOUS BLD VENIPUNCTURE: CPT | Performed by: EMERGENCY MEDICINE

## 2022-04-09 PROCEDURE — 99285 EMERGENCY DEPT VISIT HI MDM: CPT | Performed by: EMERGENCY MEDICINE

## 2022-04-09 PROCEDURE — 99284 EMERGENCY DEPT VISIT MOD MDM: CPT

## 2022-04-09 PROCEDURE — 84443 ASSAY THYROID STIM HORMONE: CPT | Performed by: EMERGENCY MEDICINE

## 2022-04-09 PROCEDURE — 80307 DRUG TEST PRSMV CHEM ANLYZR: CPT | Performed by: EMERGENCY MEDICINE

## 2022-04-09 PROCEDURE — 96372 THER/PROPH/DIAG INJ SC/IM: CPT

## 2022-04-09 PROCEDURE — 82077 ASSAY SPEC XCP UR&BREATH IA: CPT | Performed by: EMERGENCY MEDICINE

## 2022-04-09 PROCEDURE — 80053 COMPREHEN METABOLIC PANEL: CPT | Performed by: EMERGENCY MEDICINE

## 2022-04-09 PROCEDURE — 80143 DRUG ASSAY ACETAMINOPHEN: CPT | Performed by: EMERGENCY MEDICINE

## 2022-04-09 PROCEDURE — 85025 COMPLETE CBC W/AUTO DIFF WBC: CPT | Performed by: EMERGENCY MEDICINE

## 2022-04-09 PROCEDURE — 0241U HB NFCT DS VIR RESP RNA 4 TRGT: CPT | Performed by: EMERGENCY MEDICINE

## 2022-04-09 PROCEDURE — 80179 DRUG ASSAY SALICYLATE: CPT | Performed by: EMERGENCY MEDICINE

## 2022-04-09 RX ORDER — ZIPRASIDONE MESYLATE 20 MG/ML
20 INJECTION, POWDER, LYOPHILIZED, FOR SOLUTION INTRAMUSCULAR ONCE
Status: COMPLETED | OUTPATIENT
Start: 2022-04-09 | End: 2022-04-09

## 2022-04-09 RX ORDER — LORAZEPAM 1 MG/1
1 TABLET ORAL ONCE
Status: DISCONTINUED | OUTPATIENT
Start: 2022-04-09 | End: 2022-04-09

## 2022-04-09 RX ORDER — GINSENG 100 MG
1 CAPSULE ORAL ONCE
Status: COMPLETED | OUTPATIENT
Start: 2022-04-09 | End: 2022-04-09

## 2022-04-09 RX ORDER — LORAZEPAM 2 MG/ML
2 INJECTION INTRAMUSCULAR ONCE
Status: DISCONTINUED | OUTPATIENT
Start: 2022-04-09 | End: 2022-04-09

## 2022-04-09 RX ORDER — LORAZEPAM 2 MG/ML
2 INJECTION INTRAMUSCULAR ONCE
Status: COMPLETED | OUTPATIENT
Start: 2022-04-09 | End: 2022-04-09

## 2022-04-09 RX ORDER — OLANZAPINE 10 MG/1
10 INJECTION, POWDER, LYOPHILIZED, FOR SOLUTION INTRAMUSCULAR ONCE
Status: DISCONTINUED | OUTPATIENT
Start: 2022-04-09 | End: 2022-04-09

## 2022-04-09 RX ORDER — OLANZAPINE 5 MG/1
10 TABLET, ORALLY DISINTEGRATING ORAL ONCE
Status: DISCONTINUED | OUTPATIENT
Start: 2022-04-09 | End: 2022-04-09

## 2022-04-09 RX ADMIN — WATER 10 ML: 1 INJECTION INTRAMUSCULAR; INTRAVENOUS; SUBCUTANEOUS at 01:58

## 2022-04-09 RX ADMIN — LORAZEPAM 2 MG: 2 INJECTION INTRAMUSCULAR; INTRAVENOUS at 01:58

## 2022-04-09 RX ADMIN — BACITRACIN 1 LARGE APPLICATION: 500 OINTMENT TOPICAL at 10:32

## 2022-04-09 RX ADMIN — ZIPRASIDONE MESYLATE 20 MG: 20 INJECTION, POWDER, LYOPHILIZED, FOR SOLUTION INTRAMUSCULAR at 01:58

## 2022-04-09 NOTE — ED NOTES
Application of bacitracin administered per order to superficial aspects of pts proximal knuckles        Jeri Beth, RADHA  04/09/22 9452

## 2022-04-09 NOTE — ED CARE HANDOFF
Emergency Department Sign Out Note        Sign out and transfer of care from Dr Aviles Sees at 6929  See Separate Emergency Department note  The patient, Nikia Shoulder, was evaluated by the previous provider for bipolar citlali, psychosis  Workup Completed:  Patient has been medically cleared by previous team   Medically cleared, awaiting crisis evaluation  ED Course / Workup Pending (followup): Patient seen evaluated by crisis worker  Patient denying any suicidal or homicidal ideation  No hallucinations  He has a history of bipolar disorder, recent hospitalization but at this point does not want to sign a 201 and meets no criteria for involuntary hospitalization  I saw and evaluated patient  He is alert, oriented, redirectable  Not psychotic  Not hallucinating, not suicidal or homicidal   At this point, no acute indication for involuntary commitment  Patient offered outpatient resources, partial program and is discharged from the emergency department in stable condition                                   Procedures  MDM        Disposition  Final diagnoses:   Bipolar disorder ([facility] 75 )   Encounter for psychological evaluation     Time reflects when diagnosis was documented in both MDM as applicable and the Disposition within this note     Time User Action Codes Description Comment    4/9/2022  1:05 AM Jasen Kraft Add [F31 9] [facility] ([facility] )     4/9/2022  1:52 PM Sameer Ngo Add [Z00 8] Encounter for psychological evaluation       ED Disposition     ED Disposition Condition Date/Time Comment    Discharge Stable Sat Apr 9, 2022  1:52 PM Nikia Shoulder discharge to home/self care              MD Documentation      Most Recent Value   Sending MD Dr William Mays MD      Follow-up Information     Follow up With Specialties Details Why Contact Info Additional [address] Emergency Department Emergency Medicine Go to  If symptoms worsen [address] [address] [facility] [address] [facility] Emergency Department, [address], [place] [facility] [place], [place], [address] [facility]  Family Medicine Schedule an appointment as soon as possible for a visit in 2 days Establish care with primary care doctor [address] [facility] [id number]  [address] , [place], [place] [facility] [place], [place], [address]        Patient's Medications    No medications on file     No discharge procedures on file         ED Provider  Electronically Signed by     Lemon Cogan, MD  04/09/22 6174

## 2022-04-09 NOTE — ED NOTES
[facility]    Name and Date of Birth:  Karolina Marina 42 y o  1975    Date of Referral: April 9, 2022    Presenting Symptoms and Stressors:      Symptoms:  manic symptoms  Stressors:  family issues and being homeless    Access to Weapons:  No    Smoking Status: a few cigarette per day    Substance Use:  Cannabis: current use    Suicidal Ideation: None    Homicidal Ideation: None    Depressed Mood: None    Cecile/Hypomania: poor concentration, difficulty sleeping    Psychosis: None    Agitation: No    Appetite Changes: normal appetite    Sleep Disturbance: decreased sleep    Diagnoses:  1  PTSD  2   ADHD, Hyperactive-Impulsive Type    Current Psychiatrist or Therapist:    Psychiatrist: None  Therapist: Therapist Kaity Call from   [address] they Require Ambulatory Assistance: No    Communication Assistance: not required     Legal Issues: history of past incarcerations        [facility]

## 2022-04-09 NOTE — ED NOTES
Per charge RN, next shift crisis is going to see crisis  Made aware they should be arriving to this facility soon        Rodrigue Dubose, RADHA  04/09/22 6918

## 2022-04-09 NOTE — ED NOTES
Pt medicated for agitation, was exhibiting manic mildly aggressive behavior      Pablo Shepherd RN  04/09/22 4787

## 2022-04-09 NOTE — ED NOTES
Pt sleeping much calmer     Remains one to one      Caterina Weiner, [address] [facility]  04/09/22 5474

## 2022-04-09 NOTE — ED NOTES
Pt in and out of the room, into the hallway, speaking to other patients  Conversation with patient concerning ambulating outside of room, tone and verbage used when vocalating  Offered pt PO meds to relax, pt adamantly refused  Pt voices he "does not need or does not take anything to calm down" that he is "calm"  Conversation with patient about staying in the room, keeping voice down, respecting others privacy  Pt able to verbalize agreement with this  Made pt aware we will be monitoring his agitation and energy level  MD made aware        Calli Carcamo RN  04/09/22 5583

## 2022-04-09 NOTE — ED NOTES
Shift/tech change for 1:1  Recieved report from previous staff  Pt remains sleeping on stretcher with no socks on  Pt appears in no distress, normal bilateral breathing movements noted  Will continue to monitor          Crissy Berkowitz RN  04/09/22 6574

## 2022-04-09 NOTE — ED NOTES
Pt ambulated to room nine to shower, pt remains 1:1 with tech staff at [facility]         Tonja Hess RN  04/09/22 1634

## 2022-04-09 NOTE — ED NOTES
Pt remains sleeping  One to one continued  Due to sleeping VSS not obtained        Argenis Royal RN  04/09/22 1171
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
## 2022-04-09 NOTE — ED NOTES
Breakfast tray provided per pts request  Pt awake, alert, and oriented  Pt calm and cooperative at this time  Aware we are awaiting crisis        Epifanio Jackson RN  04/09/22 2623

## 2022-04-09 NOTE — ED NOTES
Pt remains sleeping  Safe environment, remains one to one   Sitter remains at bedside      Dmitry [facility]  04/09/22 8316

## 2022-04-09 NOTE — ED TRIAGE NOTES
Pt to ed via ems with complaint of manic behavior  Has not been compliant with meds and has been arguing with family and neighbors  Has been wandering around neighborhood by his house for several days  This RN witnessed pt with visual hallucination during triage
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
## 2022-04-09 NOTE — ED NOTES
This writer discussed the patients current presentation and recommended discharge plan with Tarah Parra MD They agree with the patient being discharged at this time with referrals and/or information about [facility] Partial program     The patient was Instructed to follow up with their [facility] therapist, and [facility] partial [facility]   The patient was provided with referral information for:shelters, and outpatient mental health treatment  This writer and the patient completed a safety plan  The patient was provided with a copy of their safety plan with encouragement to utilize the plan following discharge  In addition, the patient was instructed to call [facility] crisis, other crisis services, [facility] or to go to the nearest ER immediately if their situation changes at any time  This writer discussed discharge plans with the patient and family- (if applicable, if not, delete), who agrees with and understands the discharge plans  SAFETY PLAN  Warning Signs (thoughts, images, mood, behavior, situations) of a potential crisis: Increased aggression, thoughts of self harm, or thoughts of harming others  Coping Skills (what can I do to take my mind off the problem, or to keep myself safe): talk with friends, take a walk, listen to music      Outside Support (who can I reach out to for support and help):  [facility] therapist, [facility] partial program         National Suicide Prevention Hotline:  0-239.385.2682    [facility] [address] [phone] - LVF Crisis/Mobile Crisis   [phone] - SLPF Crisis   [facility]: [phone]  [facility]: [place] [address] [place] [address] [address] [phone] - Crisis   [phone] - Peer Support Talk Line (1-9pm daily)  [phone] - Teen Support Talk Line (1-9pm daily)  [address] [place] ([place]) [phone] - [address] [facility]

## 2022-04-11 ENCOUNTER — TELEPHONE (OUTPATIENT)
Dept: PSYCHOLOGY | Facility: CLINIC | Age: 47
End: 2022-04-11

## 2022-04-11 NOTE — TELEPHONE ENCOUNTER
Tried to call patient to offer PHP but according to the message, patient doesn't take calls at this time and no voicemail option  Can try later      Dwayne

## 2022-04-13 ENCOUNTER — HOSPITAL ENCOUNTER (EMERGENCY)
Facility: HOSPITAL | Age: 47
End: 2022-04-18
Attending: EMERGENCY MEDICINE
Payer: MEDICARE

## 2022-04-13 DIAGNOSIS — F12.20 CANNABIS DEPENDENCE, CONTINUOUS (HCC): ICD-10-CM

## 2022-04-13 DIAGNOSIS — F31.2 BIPOLAR I DISORDER, MOST RECENT EPISODE MANIC, SEVERE WITH PSYCHOTIC FEATURES (HCC): Primary | Chronic | ICD-10-CM

## 2022-04-13 DIAGNOSIS — F10.10 ALCOHOL ABUSE, CONTINUOUS: ICD-10-CM

## 2022-04-13 LAB
AMPHETAMINES SERPL QL SCN: NEGATIVE
ANION GAP SERPL CALCULATED.3IONS-SCNC: 6 MMOL/L (ref 4–13)
BARBITURATES UR QL: NEGATIVE
BASOPHILS # BLD AUTO: 0.06 THOUSANDS/ΜL (ref 0–0.1)
BASOPHILS NFR BLD AUTO: 1 % (ref 0–1)
BENZODIAZ UR QL: NEGATIVE
BUN SERPL-MCNC: 13 MG/DL (ref 5–25)
CALCIUM SERPL-MCNC: 9.1 MG/DL (ref 8.4–10.2)
CHLORIDE SERPL-SCNC: 105 MMOL/L (ref 96–108)
CO2 SERPL-SCNC: 25 MMOL/L (ref 21–32)
COCAINE UR QL: NEGATIVE
CREAT SERPL-MCNC: 0.71 MG/DL (ref 0.6–1.3)
EOSINOPHIL # BLD AUTO: 0.25 THOUSAND/ΜL (ref 0–0.61)
EOSINOPHIL NFR BLD AUTO: 2 % (ref 0–6)
ERYTHROCYTE [DISTWIDTH] IN BLOOD BY AUTOMATED COUNT: 12.6 % (ref 11.6–15.1)
ETHANOL SERPL-MCNC: <10 MG/DL
FLUAV RNA RESP QL NAA+PROBE: NEGATIVE
FLUBV RNA RESP QL NAA+PROBE: NEGATIVE
GFR SERPL CREATININE-BSD FRML MDRD: 114 ML/MIN/1.73SQ M
GLUCOSE SERPL-MCNC: 131 MG/DL (ref 65–140)
HCT VFR BLD AUTO: 44.9 % (ref 36.5–49.3)
HGB BLD-MCNC: 15.5 G/DL (ref 12–17)
IMM GRANULOCYTES # BLD AUTO: 0.04 THOUSAND/UL (ref 0–0.2)
IMM GRANULOCYTES NFR BLD AUTO: 0 % (ref 0–2)
LYMPHOCYTES # BLD AUTO: 2.53 THOUSANDS/ΜL (ref 0.6–4.47)
LYMPHOCYTES NFR BLD AUTO: 22 % (ref 14–44)
MCH RBC QN AUTO: 31 PG (ref 26.8–34.3)
MCHC RBC AUTO-ENTMCNC: 34.5 G/DL (ref 31.4–37.4)
MCV RBC AUTO: 90 FL (ref 82–98)
METHADONE UR QL: NEGATIVE
MONOCYTES # BLD AUTO: 0.89 THOUSAND/ΜL (ref 0.17–1.22)
MONOCYTES NFR BLD AUTO: 8 % (ref 4–12)
NEUTROPHILS # BLD AUTO: 7.57 THOUSANDS/ΜL (ref 1.85–7.62)
NEUTS SEG NFR BLD AUTO: 67 % (ref 43–75)
NRBC BLD AUTO-RTO: 0 /100 WBCS
OPIATES UR QL SCN: NEGATIVE
OXYCODONE+OXYMORPHONE UR QL SCN: NEGATIVE
PCP UR QL: NEGATIVE
PLATELET # BLD AUTO: 314 THOUSANDS/UL (ref 149–390)
PMV BLD AUTO: 8.6 FL (ref 8.9–12.7)
POTASSIUM SERPL-SCNC: 3.7 MMOL/L (ref 3.5–5.3)
RBC # BLD AUTO: 5 MILLION/UL (ref 3.88–5.62)
RSV RNA RESP QL NAA+PROBE: NEGATIVE
SARS-COV-2 RNA RESP QL NAA+PROBE: NEGATIVE
SODIUM SERPL-SCNC: 136 MMOL/L (ref 135–147)
THC UR QL: POSITIVE
WBC # BLD AUTO: 11.34 THOUSAND/UL (ref 4.31–10.16)

## 2022-04-13 PROCEDURE — 80307 DRUG TEST PRSMV CHEM ANLYZR: CPT | Performed by: EMERGENCY MEDICINE

## 2022-04-13 PROCEDURE — 80048 BASIC METABOLIC PNL TOTAL CA: CPT | Performed by: EMERGENCY MEDICINE

## 2022-04-13 PROCEDURE — 36415 COLL VENOUS BLD VENIPUNCTURE: CPT | Performed by: EMERGENCY MEDICINE

## 2022-04-13 PROCEDURE — 99285 EMERGENCY DEPT VISIT HI MDM: CPT

## 2022-04-13 PROCEDURE — 96372 THER/PROPH/DIAG INJ SC/IM: CPT

## 2022-04-13 PROCEDURE — 82077 ASSAY SPEC XCP UR&BREATH IA: CPT | Performed by: EMERGENCY MEDICINE

## 2022-04-13 PROCEDURE — 85025 COMPLETE CBC W/AUTO DIFF WBC: CPT | Performed by: EMERGENCY MEDICINE

## 2022-04-13 PROCEDURE — 0241U HB NFCT DS VIR RESP RNA 4 TRGT: CPT | Performed by: EMERGENCY MEDICINE

## 2022-04-13 PROCEDURE — 99285 EMERGENCY DEPT VISIT HI MDM: CPT | Performed by: EMERGENCY MEDICINE

## 2022-04-13 RX ORDER — DIPHENHYDRAMINE HYDROCHLORIDE 50 MG/ML
50 INJECTION INTRAMUSCULAR; INTRAVENOUS ONCE
Status: COMPLETED | OUTPATIENT
Start: 2022-04-13 | End: 2022-04-13

## 2022-04-13 RX ORDER — ZIPRASIDONE MESYLATE 20 MG/ML
20 INJECTION, POWDER, LYOPHILIZED, FOR SOLUTION INTRAMUSCULAR ONCE
Status: COMPLETED | OUTPATIENT
Start: 2022-04-13 | End: 2022-04-13

## 2022-04-13 RX ORDER — NICOTINE 21 MG/24HR
21 PATCH, TRANSDERMAL 24 HOURS TRANSDERMAL ONCE
Status: COMPLETED | OUTPATIENT
Start: 2022-04-13 | End: 2022-04-14

## 2022-04-13 RX ORDER — ZIPRASIDONE MESYLATE 20 MG/ML
20 INJECTION, POWDER, LYOPHILIZED, FOR SOLUTION INTRAMUSCULAR ONCE
Status: DISCONTINUED | OUTPATIENT
Start: 2022-04-13 | End: 2022-04-14

## 2022-04-13 RX ORDER — LORAZEPAM 2 MG/ML
2 INJECTION INTRAMUSCULAR ONCE
Status: COMPLETED | OUTPATIENT
Start: 2022-04-13 | End: 2022-04-13

## 2022-04-13 RX ADMIN — ZIPRASIDONE MESYLATE 20 MG: 20 INJECTION, POWDER, LYOPHILIZED, FOR SOLUTION INTRAMUSCULAR at 06:38

## 2022-04-13 RX ADMIN — LORAZEPAM 2 MG: 2 INJECTION INTRAMUSCULAR; INTRAVENOUS at 06:38

## 2022-04-13 RX ADMIN — DIPHENHYDRAMINE HYDROCHLORIDE 50 MG: 50 INJECTION, SOLUTION INTRAMUSCULAR; INTRAVENOUS at 06:38

## 2022-04-13 RX ADMIN — WATER: 1 INJECTION INTRAMUSCULAR; INTRAVENOUS; SUBCUTANEOUS at 06:38

## 2022-04-13 RX ADMIN — NICOTINE 21 MG: 21 PATCH, EXTENDED RELEASE TRANSDERMAL at 06:36

## 2022-04-13 NOTE — ED NOTES
Patient escorted to bathroom by Renetta Mooney, EDT to shower; supplies and new paper scrubs given to patient  Clean sheets placed on patient stretcher        Amaury Chiu  04/13/22 3361
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
## 2022-04-13 NOTE — ED NOTES
Pt asking to leave Isela Wadsworth from [facility] asked to come to explain  His right to pt        Timmy Branch, RADHA  04/13/22 6961

## 2022-04-13 NOTE — LETTER
[facility] EMERGENCY DEPARTMENT  [address] [facility] [address]  Dept: [phone]      EMTALA TRANSFER CONSENT    NAME Mali Ribeiro                                         1975                              MRN [id number]    I have been informed of my rights regarding examination, treatment, and transfer   by Dr Joanne Flores MD    Benefits: Specialized equipment and/or services available at the receiving facility (Include comment)________________________ [facility])    Risks: Potential for delay in receiving treatment      Transfer Request   I acknowledge that my medical condition has been evaluated and explained to me by the emergency department physician or other qualified medical person and/or my attending physician who has recommended and offered to me further medical examination and treatment  I understand the Hospital's obligation with respect to the treatment and stabilization of my emergency medical condition  I nevertheless request to be transferred  I release the Hospital, the doctor, and any other persons caring for me from all responsibility or liability for any injury or ill effects that may result from my transfer and agree to accept all responsibility for the consequences of my choice to transfer, rather than receive stabilizing treatment at the Hospital  I understand that because the transfer is my request, my insurance may not provide reimbursement for the services  The Hospital will assist and direct me and my family in how to make arrangements for transfer, but the hospital is not liable for any fees charged by the transport service  In spite of this understanding, I refuse to consent to further medical examination and treatment which has been offered to me, and request transfer to [address], [address] : [address] [place], [place]   I authorize the performance of emergency medical procedures and treatments upon me in both transit and upon arrival at the receiving facility  Additionally, I authorize the release of any and all medical records to the receiving facility and request they be transported with me, if possible  I authorize the performance of emergency medical procedures and treatments upon me in both transit and upon arrival at the receiving facility  Additionally, I authorize the release of any and all medical records to the receiving facility and request they be transported with me, if possible  I understand that the safest mode of transportation during a medical emergency is an ambulance and that the Hospital advocates the use of this mode of transport  Risks of traveling to the receiving facility by car, including absence of medical control, life sustaining equipment, such as oxygen, and medical personnel has been explained to me and I fully understand them  (YESSI CORRECT BOX BELOW)  [  ]  I consent to the stated transfer and to be transported by ambulance/helicopter  [  ]  I consent to the stated transfer, but refuse transportation by ambulance and accept full responsibility for my transportation by car  I understand the risks of non-ambulance transfers and I exonerate the Hospital and its staff from any deterioration in my condition that results from this refusal     X___________________________________________    DATE  22  TIME________  Signature of patient or legally responsible individual signing on patient behalf           RELATIONSHIP TO PATIENT_________________________          Provider Certification    NAME Juan Carlos Carreon                                        [facility] 1975                              MRN [id number]    A medical screening exam was performed on the above named patient  Based on the examination:    Condition Necessitating Transfer The primary encounter diagnosis was Bipolar I disorder, most recent episode manic, severe with psychotic features ([facility] [address] )   Diagnoses of Alcohol abuse, continuous and Cannabis dependence, continuous ([facility] [address] ) were also pertinent to this visit  Patient Condition: The patient has been stabilized such that within reasonable medical probability, no material deterioration of the patient condition or the condition of the unborn child(rey) is likely to result from the transfer    Reason for Transfer: Level of Care needed not available at this facility ([address])    Transfer Requirements: [address] [place], [place]   · Space available and qualified personnel available for treatment as acknowledged by Ashleigh Aguilar  [phone]  · Agreed to accept transfer and to provide appropriate medical treatment as acknowledged by       Dr Edwin Patrick  · Appropriate medical records of the examination and treatment of the patient are provided at the time of transfer   [address], [address] _______  · Transfer will be performed by qualified personnel from    and appropriate transfer equipment as required, including the use of necessary and appropriate life support measures  Provider Certification: I have examined the patient and explained the following risks and benefits of being transferred/refusing transfer to the patient/family:         Based on these reasonable risks and benefits to the patient and/or the unborn child(rey), and based upon the information available at the time of the patients examination, I certify that the medical benefits reasonably to be expected from the provision of appropriate medical treatments at another medical facility outweigh the increasing risks, if any, to the individuals medical condition, and in the case of labor to the unborn child, from effecting the transfer      X____________________________________________ DATE 04/18/22        TIME_______      ORIGINAL - SEND TO MEDICAL RECORDS   COPY - SEND WITH PATIENT DURING TRANSFER

## 2022-04-13 NOTE — ED NOTES
Patient eating turkey sandwich, applesauce and pretzels  Warm blanket provided as well  No complaints or outward signs of distress noted at this time        Mimi Chiu  04/13/22 1026
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
## 2022-04-13 NOTE — ED NOTES
Pt changed and given lunch, as well as phone to call his brother       [address], RN  04/13/22 0219

## 2022-04-13 NOTE — ED NOTES
No [facility] or [address] accepted at the following facilities: [facility], [address], [employer] ([address]), [place], [place], [address], [place], [place], [address], [facility], [facility], [facility], [address] [place] [address] / Jorge Lopez / Roxy Alexander (294 petitioner must appear in person and facilities are several hours away)  [facility] - per Zarina Preciado, no acute beds at present    [facility] - per Lai Nair, they can review; faxed clinical and [address], [address] per Naif Goncalves, they have 1 male acute bed, with multiple referrals, but can review; faxed clincal and 302 - subsequently denied, no appropriate beds  [facility] - per Rosana Bone, no beds     [facility] - per Yashira Pagan - no beds     [facility] - per Mariya, no beds at present    [facility] - per Vicki Bartholomew, they can review; faxed clinical and [address] [facility],[address] - per [place], no beds  Intake has 302  [place] - per [employer], no beds    Referrals were faxed to the following facilities: [facility] and [facility]  Crisis to follow-up

## 2022-04-13 NOTE — ED NOTES
Pt asking for a nicotine patch  Pt advised it was placed on at 0630 this morning and would be good until tomorrow at the same time        Flor Stout RN  04/13/22 7376

## 2022-04-13 NOTE — ED NOTES
Insurance Authorization for admission:   Phone call placed to [facility] number: [phone] to Jonah Renae  3 days approved  Level of care: [facility]; 302  Review on TBD  Authorization # to be given to accepting facility upon patient's arrival         EVS (Eligibility Verification System) called - [phone]    Automated system indicates: Eligible for [address] [place] [address] for Transportation:    TBD

## 2022-04-13 NOTE — ED NOTES
Security currently trying to change patient and gather belongings  Patient continues to be restless and yelling out       Belen Farias RN  04/13/22 3819
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
## 2022-04-13 NOTE — ED NOTES
Order for Geodon put on hold at this time  Dr Yogesh Caro aware, pt went back to sleep after changing       Flor Stout, RN  04/13/22 1037
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
## 2022-04-13 NOTE — ED NOTES
RN, CIS and security at patient bedside to discuss patient rights, obtain blood work and change patient into paper scrubs        Sterling Schulte  04/13/22 0676

## 2022-04-13 NOTE — ED NOTES
1:1 observation of patient initiated at this time  Patient resting comfortably in stretcher with no outward signs of distress at this time  Will continue to monitor        Eddye Calender  04/13/22 0122

## 2022-04-14 PROCEDURE — 99243 OFF/OP CNSLTJ NEW/EST LOW 30: CPT | Performed by: PSYCHIATRY & NEUROLOGY

## 2022-04-14 RX ORDER — ZIPRASIDONE HYDROCHLORIDE 20 MG/1
60 CAPSULE ORAL ONCE
Status: COMPLETED | OUTPATIENT
Start: 2022-04-14 | End: 2022-04-14

## 2022-04-14 RX ORDER — OLANZAPINE 5 MG/1
5 TABLET, ORALLY DISINTEGRATING ORAL 4 TIMES DAILY
Status: DISCONTINUED | OUTPATIENT
Start: 2022-04-14 | End: 2022-04-14

## 2022-04-14 RX ORDER — DIVALPROEX SODIUM 500 MG/1
500 TABLET, EXTENDED RELEASE ORAL 2 TIMES DAILY
Status: DISCONTINUED | OUTPATIENT
Start: 2022-04-14 | End: 2022-04-18 | Stop reason: HOSPADM

## 2022-04-14 RX ORDER — LORAZEPAM 2 MG/ML
2 INJECTION INTRAMUSCULAR ONCE
Status: COMPLETED | OUTPATIENT
Start: 2022-04-14 | End: 2022-04-15

## 2022-04-14 RX ORDER — DIPHENHYDRAMINE HYDROCHLORIDE 50 MG/ML
50 INJECTION INTRAMUSCULAR; INTRAVENOUS ONCE
Status: DISCONTINUED | OUTPATIENT
Start: 2022-04-14 | End: 2022-04-18 | Stop reason: HOSPADM

## 2022-04-14 RX ORDER — CLONAZEPAM 1 MG/1
1 TABLET ORAL 2 TIMES DAILY
Status: DISCONTINUED | OUTPATIENT
Start: 2022-04-14 | End: 2022-04-18 | Stop reason: HOSPADM

## 2022-04-14 RX ORDER — OLANZAPINE 5 MG/1
5 TABLET, ORALLY DISINTEGRATING ORAL 4 TIMES DAILY
Status: DISCONTINUED | OUTPATIENT
Start: 2022-04-14 | End: 2022-04-18 | Stop reason: HOSPADM

## 2022-04-14 RX ORDER — HALOPERIDOL 5 MG/ML
5 INJECTION INTRAMUSCULAR ONCE
Status: COMPLETED | OUTPATIENT
Start: 2022-04-14 | End: 2022-04-15

## 2022-04-14 RX ORDER — LORAZEPAM 1 MG/1
2 TABLET ORAL ONCE
Status: COMPLETED | OUTPATIENT
Start: 2022-04-14 | End: 2022-04-14

## 2022-04-14 RX ADMIN — LORAZEPAM 2 MG: 1 TABLET ORAL at 08:41

## 2022-04-14 RX ADMIN — OLANZAPINE 5 MG: 5 TABLET, ORALLY DISINTEGRATING ORAL at 07:22

## 2022-04-14 RX ADMIN — OLANZAPINE 5 MG: 5 TABLET, ORALLY DISINTEGRATING ORAL at 18:01

## 2022-04-14 RX ADMIN — ZIPRASIDONE HYDROCHLORIDE 60 MG: 20 CAPSULE ORAL at 08:41

## 2022-04-14 RX ADMIN — CLONAZEPAM 1 MG: 1 TABLET ORAL at 18:02

## 2022-04-14 NOTE — ED RE-EVALUATION NOTE
I received sign-out on the patient at change of shift  1  Bipolar I disorder, most recent episode manic, severe with psychotic features [facility])      ED Course as of 04/14/22 2250   Thu Apr 14, 2022 1914 Case signed out to me pending psychiatric placement  2250 Case signed out to Dr Ottoniel Stockton pending psychiatric placement  Disposition: Awaiting psychiatric placement         Day Vegas MD  04/14/22 2250

## 2022-04-14 NOTE — ED NOTES
[address] [facility]: Kept ringing    [place]: Per Ashleigh Ill, no beds   [facility]: Per [facility], no beds   [facility]: Per Lele Morning, no [employer]: Per John, no beds   [facility]: Per Shorty Rocha, high acuity male  [place]: Per Pl[place], no beds   [place]: Per Ramon, no beds     Faxed referral to [facility] for review

## 2022-04-14 NOTE — ED NOTES
Pt's mother's phone number obtained from personal phone     James Patricio [phone]     April Shushan  04/14/22 9693

## 2022-04-14 NOTE — ED NOTES
Security called to patients room patient hitting walls and doors     [facility] [employer]  04/14/22 5024

## 2022-04-14 NOTE — ED NOTES
Patient asking to make phone call again this writer started to dial the number when patient started snoring and fell right asleep patient is now resting no signs of distress will continue to monitor      Jeannine Parry  04/14/22 [phone]

## 2022-04-14 NOTE — ED NOTES
Patient denied at [facility] due to them not having any high acuity beds available at this time

## 2022-04-14 NOTE — ED NOTES
Patient yelling on the phone to his mother that he is going to "senia for false imprisonments and 302 her ass" patient continues to try and dial other phone numbers and are not able to connect             Harris Noblse  04/14/22 7456

## 2022-04-14 NOTE — ED NOTES
Per Dr Yogesh Caro, ok to administer medication ordered once patient is awake        Dannie Yap RN  04/14/22 4553

## 2022-04-14 NOTE — ED NOTES
Assumed 1:1 at this time  Pt is up and walking around room, talking to self  Pt requested shower and a coffee  Male employee to escort pt to shower  Will continue to monitor        Enedelia Wilfred  04/14/22 7259

## 2022-04-14 NOTE — ED NOTES
Pt provided w/ a new pair of pants, pt stated multiple times "his balls were hanging out "     Beau Colin  04/14/22 7962

## 2022-04-14 NOTE — ED NOTES
Patient yelling that he is going to senia because we are holding him hostage        Deepak Cespedes  04/14/22 8793

## 2022-04-14 NOTE — ED NOTES
Patient started yelling that he wanted to speak with the crisis  Crisis went into the room and he started yelling at her that hid rights were never read to him  Rights were given to patient again and he broke the remote in half  Patient will no longer be aloud to have the remote, table or chair in his room  Patient was asked to stop yelling an stay in his room  Patient continues to hit the wall in the room and will not stay seated        Britta Kennedy  04/14/22 8164

## 2022-04-14 NOTE — ED NOTES
Patient keeps walking to the sink to wash his head patient was told this is the last time he is coming to the sink      Mazin Stokes  04/14/22 4231

## 2022-04-14 NOTE — ED NOTES
Patient asked to use the phone, this writer dialed the phone number and handed patient the phone      Rocío Garcia  04/14/22 [phone]
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
## 2022-04-14 NOTE — ED NOTES
Taking over 1:1 at this time patient is in bed laying down resting at this time no signs of distress   Will continue to monitor      Sujey Anthony  04/14/22 5604

## 2022-04-14 NOTE — ED NOTES
Pt offered his scheduled medications, pt adamantly refusing stating "I dont take no pills, I only smoke marijuana   im not taking no pills until I know how many days Im staying here "     Khadar Breen RN  04/14/22 1800

## 2022-04-14 NOTE — ED NOTES
Pt acting erratic in room provider at bedside  Nurses and provider encouraging pt to calm down with no success  Medication ordered to deescalate behavior        Teresa Knowles RN  04/14/22 9180

## 2022-04-14 NOTE — ED NOTES
Patient ripping blanket into strips and tieing it around his ankle patient was told he needs to stop ripping the blankets        Jenny Tineo  04/14/22 183

## 2022-04-14 NOTE — ED NOTES
Pt talking with Psychiatrist, Dr Sophia Sierra via iPad for consult      Manpreet Andrea  04/14/22 1016

## 2022-04-14 NOTE — ED NOTES
Pt continues with odd, erratic behavior  Security escorted pt to shower, Writing Tech checked changed pt's sheets and checked room pt had taken socks and stuffed cardboard and garbage in sock and tied sock into ball  Writing Tech observed pt striking it on ground earlier  Pt had also similarly tied pillow/pillow case together  Nurse made aware of findings  Nurse instructed Writing Tech to remove excess linen from room  Pt now only has one sheet and one blanket  When pt returned from shower, pt had items viable in pocket  With Security present, Writing Tech asked pt to remove objects from room  Pt had crayon hidden in soap bottle and had towels in pocket  Pt has two pairs of socks, which pt is currently wearing  Writing Tech informed pt that if the socks are removed from feet, they would be taken away  Will continue to monitor        Nilsa Moralez  04/14/22 9095

## 2022-04-14 NOTE — ED NOTES
Pt provided with w/ warm decaf coffee and breakfast sandwich as requested       Shellie Steele  04/14/22 0056

## 2022-04-14 NOTE — ED NOTES
Psych consult called to [address] [place] [address]        Carlos Hyatt RN  04/14/22 1491
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
## 2022-04-15 PROCEDURE — 96372 THER/PROPH/DIAG INJ SC/IM: CPT

## 2022-04-15 RX ORDER — LORAZEPAM 2 MG/ML
2 INJECTION INTRAMUSCULAR ONCE
Status: DISCONTINUED | OUTPATIENT
Start: 2022-04-15 | End: 2022-04-18 | Stop reason: HOSPADM

## 2022-04-15 RX ORDER — LORAZEPAM 2 MG/ML
2 INJECTION INTRAMUSCULAR ONCE
Status: COMPLETED | OUTPATIENT
Start: 2022-04-15 | End: 2022-04-15

## 2022-04-15 RX ORDER — HALOPERIDOL 5 MG/ML
5 INJECTION INTRAMUSCULAR ONCE
Status: COMPLETED | OUTPATIENT
Start: 2022-04-15 | End: 2022-04-15

## 2022-04-15 RX ADMIN — OLANZAPINE 5 MG: 5 TABLET, ORALLY DISINTEGRATING ORAL at 08:45

## 2022-04-15 RX ADMIN — LORAZEPAM 2 MG: 2 INJECTION INTRAMUSCULAR; INTRAVENOUS at 12:00

## 2022-04-15 RX ADMIN — HALOPERIDOL LACTATE 5 MG: 5 INJECTION, SOLUTION INTRAMUSCULAR at 12:08

## 2022-04-15 RX ADMIN — LORAZEPAM 2 MG: 2 INJECTION INTRAMUSCULAR; INTRAVENOUS at 14:54

## 2022-04-15 RX ADMIN — CLONAZEPAM 1 MG: 1 TABLET ORAL at 08:45

## 2022-04-15 RX ADMIN — HALOPERIDOL LACTATE 5 MG: 5 INJECTION, SOLUTION INTRAMUSCULAR at 11:14

## 2022-04-15 NOTE — ED NOTES
Pt came out of room and pushed the chair and computer away before trying to enter another pt's room and began to burp in all of the nurses faces        Shar Cough  04/15/22 1121

## 2022-04-15 NOTE — ED NOTES
Per [place], Intake, patient denied by Dr Meme Reddy at [facility] for acuity, they are unable to safely manage patient on unit right now  Dr Meme Reddy was asked by Intake to reach to EDMD Sheryle Odor to make medication recommendations for stabilization  This writer informed EDMD Sheryle Odor of the same

## 2022-04-15 NOTE — ED NOTES
Pt stepping out of their room and indicating that they wanted a shower  Pt then crushed their drink on the floor  Explained that the pt will be taken for a shower when they are able to follow instructions and not be destructive  Pt slammed the door on the nurse who was in the room with the pt  Pt then punched the plastic window in the room  Pt said he will destroy everything in the room, pt then ripped the pillow with his teeth and took out the stuffing from the pillow  Pt non compliant and threatening staff at this time, pt "I will fuck anyone up that comes near me right now"  Security on hand, attending aware of the pt's current actions   Awaiting orders      Ashley Davis RN  04/15/22 9643

## 2022-04-15 NOTE — RESTRAINT FACE TO FACE
Restraint Face to Face   Danny Cooney 42 y o  male MRN: [id number]  Unit/Bed#: [facility] 02 Encounter: [id number]      Physical Evaluation patient has become increasingly violent, is threatening to hurt staff and punching and kicking walls and windows    Purpose for Restraints/ Seclusion High risk for self harm, High risk for causing significant disruption of treatment environment  and High risk for harm to others  Patient's reaction to the intervention with calming medications and restraints patient has become more calm  Patient's medical condition bipolar disorder with psychotic features  Patient's Behavioral condition of the agitation and violent behavior  Restraints to be Continued

## 2022-04-15 NOTE — ED NOTES
Phillip Screen- no appropriate bed  Joanna Hernandez: Marilin-lno appropriate bed  [place]:  Shay Villalobos- no appropriate bed  [facility]: no answer  [facility]: Isabel Fontenot- no appropriate bed  [facility]: Vadim Hayden- no appropriate bed  [place]:  Ramirez-no beds  [place]: Annia- denied due to acuity  [place]: Gilbert- no beds  [place]: Jimmy Baird- no beds  [facility]: Corazon Park- no beds  [place]: no answer  [facility]: no answer  Lizzy Roch: Zhen [place]- no beds  [place]: Ed- no beds  [facility]: Neyda [place]- no beds

## 2022-04-15 NOTE — ED NOTES
Patient asked for new socks and lotion  Location was placed into hand and bottle was removed from room  Socks were given        Camille Carolina  04/15/22 8707

## 2022-04-15 NOTE — ED NOTES
Unable to assess pt at this time due to sleeping   Will continue to monitor      Trini Jama RN  04/15/22 1656

## 2022-04-15 NOTE — ED NOTES
Pt was punching walls and threatening staff members and therefore restraints were ordered and pt was placed into four point locked restraints  1:1 at bedside, will continue to monitor        Krissy Alcocer  04/15/22 0738

## 2022-04-15 NOTE — ED NOTES
No [facility] or [address] accepted at the following facilities: [facility], [address], [employer] ([facility]), [facility], [address] [place], [place], [place], [address], [facility], [facility] 374, 0[facility], [address] [place] [address], [facility], [address] [place] [address] / Kimberlyn Severino / Chrissy Sanchez (594 petitioner must appear in person and facilities are several hours away)  [place] - per Jeancarlos King, they can review; referral faxed  [facility] - per Dustin Roberts, they are unable to review due to 302 expiring before they hold their 303 hearings  Could review if [address] [place] [address] Previously denied  [address] [facility] [place] per Rafael Benton, no appropriate beds    [facility] - per David Brooke, no beds available today  [facility] - per Shelia Seymour, no appropriate beds due to their current unit acuity  [facility] - per KAR[employer]E [employer], no acute beds available  [facility] [place] -  Previously denied  [facility], [facility] - no beds per [facility] - voice mail message left, requesting a return call  [address] - no beds per Yoel Turner - no beds per [place]  [facility] - per [facility], no beds at current time  Intake has [address] per Lorie Selby, no beds today or tomorrow on unit    Referrals were faxed to the following facilities: Shalini  Crisis to follow-up

## 2022-04-15 NOTE — ED NOTES
Patient refused all intramuscular medications ordered at this time  MD made aware, will continue to monitor        [address] Michel, RN  04/15/22 5384

## 2022-04-15 NOTE — ED NOTES
Taking over 1:1 at this time patient asked to call his mom he is on the phone speaking loud about getting the 302 canceled        Nimco Horta  04/15/22 0062

## 2022-04-15 NOTE — ED NOTES
[facility]:  Fax clinical per Jimmy France:  No beds per Abhijit Reyeser:  Fax clinical per Francisca Lorenzana:  No acute beds available per Andrei Kendall  [facility]:  No beds per Grabiel Minor  [facility]:  [facility] beds per [facility]:  No beds per [facility]:  No beds per Sami Mcfarland:  No beds per Jose Luis Hernandez  [facility]:  No beds per Miguel Pedro:  No beds per Rj Hernandez  [facility]:  No beds per Calli Plane

## 2022-04-15 NOTE — ED NOTES
Patient is feeling him self and stating "do you want me to do this rite here I will give you a sample" patient was redirected to stop touching himself   Asked to sit down patient stated " I don't need to"     Johann Alvarez  04/15/22 0968

## 2022-04-15 NOTE — ED CARE HANDOFF
Emergency Department Sign Out Note        Sign out and transfer of care from Dr Gayle Britton  See Separate Emergency Department note  The patient, Romy Meneses, was evaluated by the previous provider for aggression, psychosis  Workup Completed:  Patient workup is complete, patient is medically clear for psychiatric evaluation    ED Course / Workup Pending (followup):  Pending psychiatric evaluation, signed a 302           Procedures  [facility]  Number of Diagnoses or Management Options  Bipolar I disorder, most recent episode manic, severe with psychotic features [facility])  Diagnosis management comments: 1207  Patient has become increasingly aggressive, is punching the walls and the windows, threatening staff, patient will be given additional medications to help calm him and will be placed in restraints  2:54 PM  Patient is again becoming increasingly agitated, patient will be given additional calming medications, restraints should be continued at this time due to violent behavior  Patient care transferred to Dr Pardeep Anderson for further management, pending psychiatric disposition         Amount and/or Complexity of Data Reviewed  Clinical lab tests: reviewed            Disposition  Final diagnoses:   Bipolar I disorder, most recent episode manic, severe with psychotic features ([facility] 75 )     Time reflects when diagnosis was documented in both MDM as applicable and the Disposition within this note     Time User Action Codes Description Comment    4/14/2022  7:07 AM Stewart Hazard Add [F31 2] Bipolar I disorder, most recent episode manic, severe with psychotic features ([facility] )     4/18/2022 12:24 PM Harjit Edmond Add [F10 10] Alcohol abuse, continuous     4/18/2022 12:24 PM Sloan Ramirez Add [F12 20] Cannabis dependence, continuous [facility])       ED Disposition     ED Disposition Condition Date/Time Comment    Transfer to [facility] Apr 18, 2022  1:54 PM Romy Meneses should be transferred out to behavioral health and has been medically cleared  MD Documentation      Most Recent Value   Patient Condition The patient has been stabilized such that within reasonable medical probability, no material deterioration of the patient condition or the condition of the unborn child(rey) is likely to result from the transfer   Reason for Transfer Level of Care needed not available at this facility  [Adult Behavioral Health]   Benefits of Transfer Specialized equipment and/or services available at the receiving facility (Include comment)________________________  [facility]]   Risks of Transfer Potential for delay in receiving treatment   Accepting Physician Dr Pricila Cheng Name, [place], [place]    (Name & Tel number) Clarence Prior  [phone]   Sending MD Dr Jackson Simpson      RN Documentation      [address] Name, [place], [place]    (Name & Tel number) Clarence Prior  [phone]   Medications Reviewed with Next Provider of Service Yes   Transport Mode Ambulance   Level of Care Basic life support   Copies of Medical Records Sent History and Physical   Patient Belongings Disposition Sent with patient   Transfer Date 04/18/22   Transfer Time 1900      Follow-up Information    None       There are no discharge medications for this patient  No discharge procedures on file         ED Provider  Electronically Signed by     Heide Hansen MD  04/20/22 7451

## 2022-04-15 NOTE — ED NOTES
S/w Libertad Morales with [facility] and he authorized 1 day extension to 302 for 303 hearing to be held Tuesday, 4/19 for A  Hao Hanna  No 303 petitioned at present  Advised Intake of the same

## 2022-04-15 NOTE — ED NOTES
Patient was given breakfast tray ate all of it and is asking for more food        Nancy Barnesdale  04/15/22 5940

## 2022-04-15 NOTE — ED NOTES
Anna Almazan at [facility]; Admissions, called to report they do not have any appropriate bed for patient at this time due to acuity

## 2022-04-15 NOTE — ED NOTES
Patient soaked blanket and put it on his head  Patient keeps coming out to play with water, patient was told he needs to stay in his room   Patient dumped urine from urinal down the sink      Chuy Escalera  04/15/22 1012

## 2022-04-16 RX ORDER — LORAZEPAM 1 MG/1
2 TABLET ORAL ONCE
Status: COMPLETED | OUTPATIENT
Start: 2022-04-16 | End: 2022-04-16

## 2022-04-16 RX ORDER — NICOTINE 21 MG/24HR
21 PATCH, TRANSDERMAL 24 HOURS TRANSDERMAL ONCE
Status: COMPLETED | OUTPATIENT
Start: 2022-04-16 | End: 2022-04-17

## 2022-04-16 RX ADMIN — NICOTINE 21 MG: 21 PATCH, EXTENDED RELEASE TRANSDERMAL at 11:18

## 2022-04-16 RX ADMIN — LORAZEPAM 2 MG: 1 TABLET ORAL at 20:45

## 2022-04-16 NOTE — ED NOTES
Spoke with patient by phone, as patient was requesting to speak to Crisis  Attempted to explain that patient is on a 36, and needs to complete mental health treatment  Patient was impatient with the wait so far, and the lack of beds  Encouraged patient to be patient, and also stated that patient would be re-evaluated on Monday, as bed placements become more available on Monday  Patient appeared to understand, but then wanted CW to come and sit and talk with him  Cw informed patient that was not possible at this time, and he needs to be patient, and relax watching tv, and enjoying snacks  Patient wanted to go outside, and cw informed patient that was not possible with him on a 302  Bed search continues  Encouraged patient to take his medication  Pt stated that he was unable to go to the partial program as recommended last time, because he has a different phone, and did not get their messages  His new number is [phone]  CW informed patient that the partial program is not an option for him at this time, that he needed to complete inpatient treatment at this time, due to the 302 status

## 2022-04-16 NOTE — ED NOTES
Hourly Note    Pt is alert, awake, and pleasant; pt watching T V & eating snacks; will continue to monitor        Lona Course  04/16/22 0797

## 2022-04-16 NOTE — ED NOTES
Hourly Note    Pt is quiet, awake, and pleasant; pt is watching T V & eating lunch leftovers; will continue to monitor        Jules Noss  04/16/22 8220

## 2022-04-16 NOTE — ED NOTES
Visitation Note    Pt visitor at beside; "Akiko Chacon," Pt significant other        Leslye Graham  04/16/22 6389

## 2022-04-16 NOTE — ED CARE HANDOFF
Emergency Department Sign Out Note        Sign out and transfer of care from Dr Ana M Wong See Separate Emergency Department note  The patient, Danielle Phelps, was evaluated by the previous provider for psychosis    Workup Completed:  Completed and reviewed    ED Course / Workup Pending (followup): Pt seen and evalauated and pt keep talking all times, has no disruptive behavior so far  Still waiting for bed availablity  Dx;  Bipolar disorder , manic type, with severe psychosis                                     Procedures  MDM        Disposition  Final diagnoses:   Bipolar I disorder, most recent episode manic, severe with psychotic features ([facility] )     Time reflects when diagnosis was documented in both MDM as applicable and the Disposition within this note     Time User Action Codes Description Comment    4/14/2022  7:07 AM Cecilia Parson Add [F31 2] Bipolar I disorder, most recent episode manic, severe with psychotic features [facility])       ED Disposition     None      MD Documentation      Most Recent Value   Sending MD Dr Joann Barber    None       Patient's Medications    No medications on file     No discharge procedures on file         ED Provider  Electronically Signed by     Dionne Jensen MD  04/16/22 3301

## 2022-04-16 NOTE — ED NOTES
Hourly Note    Pt is alert, awake, and pleasant; pt watching T V; will continue to monitor        Leslye Weinersa  04/16/22 0805

## 2022-04-16 NOTE — ED NOTES
Hourly Note    Pt is alert, awake, and pleasant; pt eating snacks; will continue to monitor        Jon Sanders  04/16/22 9705

## 2022-04-16 NOTE — ED NOTES
Crisis spoke to Clayton Kelsey at intake  Intake has 302 and will review Pt once high acuity bed is opened  Pt has been denied at several facilities due to no high acuity beds available

## 2022-04-16 NOTE — ED NOTES
PT requesting to use phone at this time, explained that he must remain calm on the phone  PT agreeable and given phone at this time       May Baires  04/16/22 4791

## 2022-04-16 NOTE — ED NOTES
Bed Search    [facility]:  No appropriate bed per [facility]:  No beds per Yusra Dk:  No appropriate beds per [facility]:  Previously denied due to acuity  [facility]:  No appropriate beds per Linda Le  [facility]:  No beds per [facility]:  No appropriate beds per [facility]:  No appropriate beds per [facility]:  Denied due to acuity per Alexandra Zendejas

## 2022-04-16 NOTE — ED NOTES
Pt sitting on stretcher eating dinner  Placing dinner ticket, empty potato chip bag in his pocket  Will continue to monitor           Opal Bhatti  04/15/22 2203       Opal Bhatti  04/15/22 2217

## 2022-04-16 NOTE — ED NOTES
Hourly Note    Pt is quiet, awake, and pleasant; pt currently taking shower; 1:1 sitter at bathroom door; will continue to monitor        Regan Youssef  04/16/22 1010

## 2022-04-16 NOTE — ED NOTES
Hourly Note    Pt is alert, awake, and pleasant; pt eating snacks & watching T V; will continue to monitor        Lis Mode  04/16/22 0398

## 2022-04-16 NOTE — ED NOTES
Hourly Note    Pt Is alert, awake, and pleasant; pt speaking with visitor; pt is content at this time; will continue to monitor        Johnny Townsend  04/16/22 9807

## 2022-04-16 NOTE — ED NOTES
Hourly Note    Pt is alert, awake, and pleasant; pt is coloring with crayons; will continue to monitor        Luba Westbrook  04/16/22 1106

## 2022-04-17 RX ORDER — LORAZEPAM 1 MG/1
2 TABLET ORAL ONCE
Status: COMPLETED | OUTPATIENT
Start: 2022-04-18 | End: 2022-04-18

## 2022-04-17 RX ORDER — LORAZEPAM 1 MG/1
2 TABLET ORAL ONCE
Status: COMPLETED | OUTPATIENT
Start: 2022-04-17 | End: 2022-04-17

## 2022-04-17 RX ORDER — NICOTINE 21 MG/24HR
21 PATCH, TRANSDERMAL 24 HOURS TRANSDERMAL ONCE
Status: COMPLETED | OUTPATIENT
Start: 2022-04-17 | End: 2022-04-18

## 2022-04-17 RX ADMIN — NICOTINE 21 MG: 21 PATCH, EXTENDED RELEASE TRANSDERMAL at 16:19

## 2022-04-17 RX ADMIN — OLANZAPINE 5 MG: 5 TABLET, ORALLY DISINTEGRATING ORAL at 04:25

## 2022-04-17 RX ADMIN — CLONAZEPAM 1 MG: 1 TABLET ORAL at 19:21

## 2022-04-17 RX ADMIN — OLANZAPINE 5 MG: 5 TABLET, ORALLY DISINTEGRATING ORAL at 10:15

## 2022-04-17 RX ADMIN — OLANZAPINE 5 MG: 5 TABLET, ORALLY DISINTEGRATING ORAL at 16:19

## 2022-04-17 RX ADMIN — OLANZAPINE 5 MG: 5 TABLET, ORALLY DISINTEGRATING ORAL at 21:04

## 2022-04-17 RX ADMIN — LORAZEPAM 2 MG: 1 TABLET ORAL at 10:27

## 2022-04-17 RX ADMIN — LORAZEPAM 2 MG: 1 TABLET ORAL at 05:52

## 2022-04-17 NOTE — ED NOTES
PTs Mother left at this time, and PTs Girlfriend came back to bedside at this time     Loren Santana  04/17/22 1523

## 2022-04-17 NOTE — ED NOTES
Visitation Note    Pt brother Marianelandlupe Jolie) stopped by to drop off pack of [employer]; items inspected by staff and placed in Pt pantry          Kena Lou  04/17/22 4465

## 2022-04-17 NOTE — ED NOTES
Hourly Note    Pt is alert, awake, cooperative, and pleasant; pt is currently showering; 1:1 sitter + security sitting by shower entrance; will continue to monitor        Trudy Curtis  04/17/22 3448

## 2022-04-17 NOTE — ED NOTES
Pt mixed water, lotion, and baby powder together in a cup and is now rubbing mixture onto his feet  Will continue to monitor       Benji Flowers  04/17/22 1929

## 2022-04-17 NOTE — ED NOTES
Pt eating food brought by girlfriend at this time  Pt's girlfriend also at bedside       Fam Smith  04/17/22 1946

## 2022-04-17 NOTE — ED NOTES
BH Hourly Note    Pt is alert, awake, cooperative, & pleasant; pt provided with snack & ice water per request; pt watching T V; will continue to monitor        Joyce Favre  04/17/22 7816

## 2022-04-17 NOTE — ED NOTES
Tech to Earl Juárez to [employer] to report given to oncoming Tech [employer]) via [employer]        Jennifer Pathak  04/17/22 9509

## 2022-04-17 NOTE — ED NOTES
Hourly Note    Pt is alert, awake, and pleasant; pt eating lunch while watching T V + talking on phone with mother; pt is content at this time; will continue to monitor        Sven Lai  04/17/22 1226

## 2022-04-17 NOTE — ED CARE HANDOFF
24 hour eval note -04/17/2022    Subjective:  57-year-old male who presents to the emergency department with auditory hallucinations and violent behavior  The brother did 36 the patient      Objective:  57-year-old male pacing around the room, does appear anxious  Hyperactive at times    Assessment:  57-year-old male he here for a psychiatric evaluation  He has been medically cleared  All of his medications have been ordered, although the patient has not been willing to take medication  Plan:  The patient will be given Ativan tonight orally for slight agitation  He still is refusing his medication  At this point crisis is continuing a bed search for him  He has not been restrained for greater than 24 hours       zOzy Candelaria DO  04/17/22 0148

## 2022-04-17 NOTE — ED NOTES
Hourly Note    Pt is quiet, awake, pleasant, and cooperative; pt is resting peacefully & watching T V; will continue to monitor        Augustin Mccormack  04/17/22 0802

## 2022-04-17 NOTE — ED NOTES
Patient denied at the following facilities:  Michaela Bergeron     No 302 beds:  [facility] - Not open/no answer  [facility] - no beds- [facility]- No answer  [place] - Spoke to Steven Pardo, no beds

## 2022-04-17 NOTE — ED NOTES
Hourly Note    Pt is quiet, awake, pleasant, and cooperative at this time; pt watching T V; will continue to monitor        Santos Hinton  04/17/22 7392

## 2022-04-17 NOTE — ED NOTES
Pt ambulated to and from restroom with no issues or complaints at this time  Will continue to monitor        Sai Fuentes  04/17/22 0141

## 2022-04-17 NOTE — ED NOTES
Pt continuously belching and pacing the room, asking patient to please stay in room and comply, pt refusing to do so at this time   Security called     Cedrick Mayer RN  04/17/22 0617

## 2022-04-17 NOTE — ED NOTES
Pt woke, walked out of room holding blanket  Unable to redirect pt back into room or retrieve blanket from pt  Pt walked into [facility] bathroom putting blanket into toilet  Security called and helped redirect pt back into room  Security remaining at bedside currently       Daniel Botello  04/17/22 5498

## 2022-04-17 NOTE — ED NOTES
Visitation Note    Pt mother at bedside for visitation; Pt is content at this time       Alexy Tatum  04/17/22 1454

## 2022-04-17 NOTE — ED NOTES
Pt ambulating to the bathroom with steady gait, escorted by ED [address] Ashraf, RADHA  04/17/22 1471

## 2022-04-17 NOTE — ED NOTES
Bed search results:    No [place] or [place] accepted at the following facilities: [facility], [address], [employer] ([address]), [facility], [address], [facility], [address] [place] [address], [facility], [facility], [facility], [address] [place] [address] / Nicki Lara / Mane Kim (750 petitioner must appear in person and facilities are several hours away)  Patient denied at the following facilities:  [place] - No beds available at this time - [facility] - no beds-[facility]- no beds  [place]- no beds  [place] - no appropriate bed at this time

## 2022-04-18 ENCOUNTER — HOSPITAL ENCOUNTER (INPATIENT)
Facility: HOSPITAL | Age: 47
LOS: 10 days | Discharge: HOME | DRG: 750 | End: 2022-04-28
Attending: HOSPITALIST | Admitting: HOSPITALIST
Payer: COMMERCIAL

## 2022-04-18 VITALS
SYSTOLIC BLOOD PRESSURE: 158 MMHG | DIASTOLIC BLOOD PRESSURE: 96 MMHG | HEART RATE: 111 BPM | TEMPERATURE: 97.7 F | RESPIRATION RATE: 16 BRPM | OXYGEN SATURATION: 97 %

## 2022-04-18 DIAGNOSIS — E55.9 VITAMIN D DEFICIENCY: ICD-10-CM

## 2022-04-18 DIAGNOSIS — F10.10 ALCOHOL ABUSE, CONTINUOUS: ICD-10-CM

## 2022-04-18 DIAGNOSIS — E53.8 VITAMIN B12 DEFICIENCY: ICD-10-CM

## 2022-04-18 DIAGNOSIS — F25.0 SCHIZOAFFECTIVE DISORDER, BIPOLAR TYPE WITH GOOD PROGNOSTIC FEATURES (HCC): Primary | Chronic | ICD-10-CM

## 2022-04-18 DIAGNOSIS — I10 HYPERTENSION: ICD-10-CM

## 2022-04-18 DIAGNOSIS — F12.20 CANNABIS DEPENDENCE, CONTINUOUS (HCC): ICD-10-CM

## 2022-04-18 DIAGNOSIS — R00.0 TACHYCARDIA: ICD-10-CM

## 2022-04-18 PROCEDURE — 99213 OFFICE O/P EST LOW 20 MIN: CPT | Performed by: PSYCHIATRY & NEUROLOGY

## 2022-04-18 RX ORDER — DIPHENHYDRAMINE HYDROCHLORIDE 50 MG/ML
50 INJECTION INTRAMUSCULAR; INTRAVENOUS EVERY 6 HOURS PRN
Status: DISCONTINUED | OUTPATIENT
Start: 2022-04-18 | End: 2022-04-28 | Stop reason: HOSPADM

## 2022-04-18 RX ORDER — ACETAMINOPHEN 325 MG/1
650 TABLET ORAL EVERY 6 HOURS PRN
Status: CANCELLED | OUTPATIENT
Start: 2022-04-18

## 2022-04-18 RX ORDER — LORAZEPAM 2 MG/ML
2 INJECTION INTRAMUSCULAR
Status: CANCELLED | OUTPATIENT
Start: 2022-04-18

## 2022-04-18 RX ORDER — LANOLIN ALCOHOL/MO/W.PET/CERES
100 CREAM (GRAM) TOPICAL DAILY
Status: DISCONTINUED | OUTPATIENT
Start: 2022-04-18 | End: 2022-04-28 | Stop reason: HOSPADM

## 2022-04-18 RX ORDER — HALOPERIDOL 5 MG/ML
2.5 INJECTION INTRAMUSCULAR
Status: CANCELLED | OUTPATIENT
Start: 2022-04-18

## 2022-04-18 RX ORDER — BENZTROPINE MESYLATE 1 MG/ML
0.5 INJECTION INTRAMUSCULAR; INTRAVENOUS
Status: DISCONTINUED | OUTPATIENT
Start: 2022-04-18 | End: 2022-04-28 | Stop reason: HOSPADM

## 2022-04-18 RX ORDER — OLANZAPINE 5 MG/1
10 TABLET, ORALLY DISINTEGRATING ORAL 2 TIMES DAILY
Status: CANCELLED | OUTPATIENT
Start: 2022-04-18

## 2022-04-18 RX ORDER — LORAZEPAM 2 MG/ML
1 INJECTION INTRAMUSCULAR
Status: CANCELLED | OUTPATIENT
Start: 2022-04-18

## 2022-04-18 RX ORDER — DIVALPROEX SODIUM 500 MG/1
500 TABLET, EXTENDED RELEASE ORAL 2 TIMES DAILY
Status: DISCONTINUED | OUTPATIENT
Start: 2022-04-18 | End: 2022-04-19

## 2022-04-18 RX ORDER — FOLIC ACID 1 MG/1
1 TABLET ORAL DAILY
Status: DISCONTINUED | OUTPATIENT
Start: 2022-04-18 | End: 2022-04-28 | Stop reason: HOSPADM

## 2022-04-18 RX ORDER — HALOPERIDOL 5 MG
5 TABLET ORAL
Status: CANCELLED | OUTPATIENT
Start: 2022-04-18

## 2022-04-18 RX ORDER — CLONAZEPAM 0.5 MG/1
0.5 TABLET ORAL 2 TIMES DAILY
Status: DISCONTINUED | OUTPATIENT
Start: 2022-04-19 | End: 2022-04-21

## 2022-04-18 RX ORDER — ACETAMINOPHEN 325 MG/1
650 TABLET ORAL EVERY 6 HOURS PRN
Status: DISCONTINUED | OUTPATIENT
Start: 2022-04-18 | End: 2022-04-28 | Stop reason: HOSPADM

## 2022-04-18 RX ORDER — TRAZODONE HYDROCHLORIDE 100 MG/1
100 TABLET ORAL
Status: CANCELLED | OUTPATIENT
Start: 2022-04-18

## 2022-04-18 RX ORDER — ACETAMINOPHEN 325 MG/1
650 TABLET ORAL EVERY 4 HOURS PRN
Status: CANCELLED | OUTPATIENT
Start: 2022-04-18

## 2022-04-18 RX ORDER — LORAZEPAM 2 MG/ML
2 INJECTION INTRAMUSCULAR EVERY 6 HOURS PRN
Status: DISCONTINUED | OUTPATIENT
Start: 2022-04-18 | End: 2022-04-28 | Stop reason: HOSPADM

## 2022-04-18 RX ORDER — LORAZEPAM 2 MG/ML
2 INJECTION INTRAMUSCULAR EVERY 6 HOURS PRN
Status: CANCELLED | OUTPATIENT
Start: 2022-04-18

## 2022-04-18 RX ORDER — POLYETHYLENE GLYCOL 3350 17 G/17G
17 POWDER, FOR SOLUTION ORAL DAILY PRN
Status: DISCONTINUED | OUTPATIENT
Start: 2022-04-18 | End: 2022-04-28 | Stop reason: HOSPADM

## 2022-04-18 RX ORDER — HYDROXYZINE HYDROCHLORIDE 25 MG/1
50 TABLET, FILM COATED ORAL
Status: CANCELLED | OUTPATIENT
Start: 2022-04-18

## 2022-04-18 RX ORDER — BENZTROPINE MESYLATE 1 MG/ML
1 INJECTION INTRAMUSCULAR; INTRAVENOUS
Status: CANCELLED | OUTPATIENT
Start: 2022-04-18

## 2022-04-18 RX ORDER — HYDROXYZINE HYDROCHLORIDE 25 MG/1
100 TABLET, FILM COATED ORAL
Status: CANCELLED | OUTPATIENT
Start: 2022-04-18

## 2022-04-18 RX ORDER — HALOPERIDOL 5 MG/ML
5 INJECTION INTRAMUSCULAR
Status: DISCONTINUED | OUTPATIENT
Start: 2022-04-18 | End: 2022-04-28 | Stop reason: HOSPADM

## 2022-04-18 RX ORDER — BENZTROPINE MESYLATE 1 MG/ML
0.5 INJECTION INTRAMUSCULAR; INTRAVENOUS
Status: CANCELLED | OUTPATIENT
Start: 2022-04-18

## 2022-04-18 RX ORDER — TRAZODONE HYDROCHLORIDE 50 MG/1
100 TABLET ORAL
Status: DISCONTINUED | OUTPATIENT
Start: 2022-04-18 | End: 2022-04-28 | Stop reason: HOSPADM

## 2022-04-18 RX ORDER — LORAZEPAM 2 MG/ML
2 INJECTION INTRAMUSCULAR
Status: DISCONTINUED | OUTPATIENT
Start: 2022-04-18 | End: 2022-04-28 | Stop reason: HOSPADM

## 2022-04-18 RX ORDER — HYDROXYZINE 50 MG/1
100 TABLET, FILM COATED ORAL
Status: DISCONTINUED | OUTPATIENT
Start: 2022-04-18 | End: 2022-04-28 | Stop reason: HOSPADM

## 2022-04-18 RX ORDER — POLYETHYLENE GLYCOL 3350 17 G/17G
17 POWDER, FOR SOLUTION ORAL DAILY PRN
Status: CANCELLED | OUTPATIENT
Start: 2022-04-18

## 2022-04-18 RX ORDER — HALOPERIDOL 5 MG
5 TABLET ORAL
Status: DISCONTINUED | OUTPATIENT
Start: 2022-04-18 | End: 2022-04-28 | Stop reason: HOSPADM

## 2022-04-18 RX ORDER — BENZTROPINE MESYLATE 1 MG/1
1 TABLET ORAL EVERY 6 HOURS PRN
Status: DISCONTINUED | OUTPATIENT
Start: 2022-04-18 | End: 2022-04-28 | Stop reason: HOSPADM

## 2022-04-18 RX ORDER — ACETAMINOPHEN 325 MG/1
975 TABLET ORAL EVERY 6 HOURS PRN
Status: CANCELLED | OUTPATIENT
Start: 2022-04-18

## 2022-04-18 RX ORDER — LORAZEPAM 2 MG/ML
1 INJECTION INTRAMUSCULAR
Status: DISCONTINUED | OUTPATIENT
Start: 2022-04-18 | End: 2022-04-28 | Stop reason: HOSPADM

## 2022-04-18 RX ORDER — HALOPERIDOL 5 MG/ML
2.5 INJECTION INTRAMUSCULAR
Status: DISCONTINUED | OUTPATIENT
Start: 2022-04-18 | End: 2022-04-28 | Stop reason: HOSPADM

## 2022-04-18 RX ORDER — ACETAMINOPHEN 325 MG/1
650 TABLET ORAL EVERY 4 HOURS PRN
Status: DISCONTINUED | OUTPATIENT
Start: 2022-04-18 | End: 2022-04-28 | Stop reason: HOSPADM

## 2022-04-18 RX ORDER — DIVALPROEX SODIUM 500 MG/1
500 TABLET, EXTENDED RELEASE ORAL 2 TIMES DAILY
Status: CANCELLED | OUTPATIENT
Start: 2022-04-18

## 2022-04-18 RX ORDER — HYDROXYZINE HYDROCHLORIDE 25 MG/1
25 TABLET, FILM COATED ORAL
Status: CANCELLED | OUTPATIENT
Start: 2022-04-18

## 2022-04-18 RX ORDER — NICOTINE 21 MG/24HR
21 PATCH, TRANSDERMAL 24 HOURS TRANSDERMAL DAILY
Status: DISCONTINUED | OUTPATIENT
Start: 2022-04-18 | End: 2022-04-18 | Stop reason: HOSPADM

## 2022-04-18 RX ORDER — HYDROXYZINE HYDROCHLORIDE 25 MG/1
25 TABLET, FILM COATED ORAL
Status: DISCONTINUED | OUTPATIENT
Start: 2022-04-18 | End: 2022-04-28 | Stop reason: HOSPADM

## 2022-04-18 RX ORDER — DIPHENHYDRAMINE HYDROCHLORIDE 50 MG/ML
50 INJECTION INTRAMUSCULAR; INTRAVENOUS EVERY 6 HOURS PRN
Status: CANCELLED | OUTPATIENT
Start: 2022-04-18

## 2022-04-18 RX ORDER — MAGNESIUM HYDROXIDE/ALUMINUM HYDROXICE/SIMETHICONE 120; 1200; 1200 MG/30ML; MG/30ML; MG/30ML
30 SUSPENSION ORAL EVERY 4 HOURS PRN
Status: DISCONTINUED | OUTPATIENT
Start: 2022-04-18 | End: 2022-04-28 | Stop reason: HOSPADM

## 2022-04-18 RX ORDER — MAGNESIUM HYDROXIDE/ALUMINUM HYDROXICE/SIMETHICONE 120; 1200; 1200 MG/30ML; MG/30ML; MG/30ML
30 SUSPENSION ORAL EVERY 4 HOURS PRN
Status: CANCELLED | OUTPATIENT
Start: 2022-04-18

## 2022-04-18 RX ORDER — BENZTROPINE MESYLATE 1 MG/ML
1 INJECTION INTRAMUSCULAR; INTRAVENOUS
Status: DISCONTINUED | OUTPATIENT
Start: 2022-04-18 | End: 2022-04-28 | Stop reason: HOSPADM

## 2022-04-18 RX ORDER — ACETAMINOPHEN 325 MG/1
975 TABLET ORAL EVERY 6 HOURS PRN
Status: DISCONTINUED | OUTPATIENT
Start: 2022-04-18 | End: 2022-04-28 | Stop reason: HOSPADM

## 2022-04-18 RX ORDER — CLONAZEPAM 0.5 MG/1
0.5 TABLET ORAL 2 TIMES DAILY
Status: CANCELLED | OUTPATIENT
Start: 2022-04-18

## 2022-04-18 RX ORDER — HALOPERIDOL 1 MG/1
2 TABLET ORAL
Status: CANCELLED | OUTPATIENT
Start: 2022-04-18

## 2022-04-18 RX ORDER — HALOPERIDOL 5 MG/ML
5 INJECTION INTRAMUSCULAR
Status: CANCELLED | OUTPATIENT
Start: 2022-04-18

## 2022-04-18 RX ORDER — BENZTROPINE MESYLATE 1 MG/1
1 TABLET ORAL EVERY 6 HOURS PRN
Status: CANCELLED | OUTPATIENT
Start: 2022-04-18

## 2022-04-18 RX ORDER — HALOPERIDOL 2 MG/1
2 TABLET ORAL
Status: DISCONTINUED | OUTPATIENT
Start: 2022-04-18 | End: 2022-04-28 | Stop reason: HOSPADM

## 2022-04-18 RX ORDER — HYDROXYZINE 50 MG/1
50 TABLET, FILM COATED ORAL
Status: DISCONTINUED | OUTPATIENT
Start: 2022-04-18 | End: 2022-04-28 | Stop reason: HOSPADM

## 2022-04-18 RX ORDER — OLANZAPINE 10 MG/1
10 TABLET, ORALLY DISINTEGRATING ORAL 2 TIMES DAILY
Status: DISCONTINUED | OUTPATIENT
Start: 2022-04-19 | End: 2022-04-19

## 2022-04-18 RX ADMIN — FOLIC ACID 1 MG: 1 TABLET ORAL at 22:56

## 2022-04-18 RX ADMIN — OLANZAPINE 5 MG: 5 TABLET, ORALLY DISINTEGRATING ORAL at 17:43

## 2022-04-18 RX ADMIN — LORAZEPAM 2 MG: 1 TABLET ORAL at 00:01

## 2022-04-18 RX ADMIN — Medication 1 TABLET: at 22:56

## 2022-04-18 RX ADMIN — THIAMINE HCL TAB 100 MG 100 MG: 100 TAB at 22:56

## 2022-04-18 RX ADMIN — OLANZAPINE 5 MG: 5 TABLET, ORALLY DISINTEGRATING ORAL at 12:29

## 2022-04-18 RX ADMIN — CLONAZEPAM 1 MG: 1 TABLET ORAL at 17:43

## 2022-04-18 RX ADMIN — TRAZODONE HYDROCHLORIDE 100 MG: 150 TABLET ORAL at 23:31

## 2022-04-18 RX ADMIN — CLONAZEPAM 1 MG: 1 TABLET ORAL at 08:53

## 2022-04-18 RX ADMIN — HYDROXYZINE HYDROCHLORIDE 100 MG: 50 TABLET, FILM COATED ORAL at 23:31

## 2022-04-18 RX ADMIN — OLANZAPINE 5 MG: 5 TABLET, ORALLY DISINTEGRATING ORAL at 08:52

## 2022-04-18 RX ADMIN — NICOTINE 21 MG: 21 PATCH, EXTENDED RELEASE TRANSDERMAL at 10:36

## 2022-04-18 NOTE — ED NOTES
Report called to [facility]    Spoke with Tarah Gomez, [facility]  04/18/22 8833

## 2022-04-18 NOTE — ED NOTES
Patient is accepted at [facility]  Patient is accepted by Dr Aureliano Loaiza per Dorcas Santamaria  Transportation is arranged with [facility] is scheduled for 1900  Patient may go to the floor upon arrival        Nurse report is to be called to [phone] prior to patient transfer

## 2022-04-18 NOTE — ED NOTES
Pts mother called back at this time, pt is currently on the phone with her in his room       Alcides Nogueira  04/18/22 9275

## 2022-04-18 NOTE — ED CARE HANDOFF
Emergency Department Sign Out Note        Sign out and transfer of care from dr Aparna Woods  See Separate Emergency Department note  The patient, Gleda Canavan, was evaluated by the previous provider for bipolar disorder    Workup Completed: Work up completed and pt medically cleared    ED Course / Workup Pending (followup):   pt has no disruptive behavior  Asked for nicotine patch  Pt accepted at [facility]                                   ED Course as of 04/18/22 1356   Sun Apr 17, 2022   1202 Pt has no disruptive behavior  Pt was burping, and start to ground himself  We give another dose of ativan to calm him down  Mon Apr 18, 2022   6858 Pt signed to me at my shift , and he is at room and until now he has no disruptive behavior and keep burping  No need for medications at this time   Pt at his room quiet  Procedures  MDM        Disposition  Final diagnoses:   Bipolar I disorder, most recent episode manic, severe with psychotic features ([facility] )     Time reflects when diagnosis was documented in both MDM as applicable and the Disposition within this note     Time User Action Codes Description Comment    4/14/2022  7:07 AM Jerod Berrios Add [F31 2] Bipolar I disorder, most recent episode manic, severe with psychotic features ([facility] )     4/18/2022 12:24 PM Tally Hands Add [F10 10] Alcohol abuse, continuous     4/18/2022 12:24 PM Philomena Ramirez Add [F12 20] Cannabis dependence, continuous [facility])       ED Disposition     ED Disposition Condition Date/Time Comment    Transfer to [facility] Apr 18, 2022  1:54 PM Gleda Canavan should be transferred out to behavioral health and has been medically cleared          MD Documentation      Most Recent Value   Patient Condition The patient has been stabilized such that within reasonable medical probability, no material deterioration of the patient condition or the condition of the unborn child(rey) is likely to result from the transfer   Reason for Transfer Level of Care needed not available at this facility  [Adult Behavioral Health]   Benefits of Transfer Specialized equipment and/or services available at the receiving facility (Include comment)________________________  [facility]]   Risks of Transfer Potential for delay in receiving treatment   Accepting Physician Dr Rebecca Deluca Name, Terrie Warner, [address]    (Name & Tel number) [facility]  [phone]   Sending MD Dr Dany Gonzalez      RN Documentation      [address] Name, Terrie Infantest, [address] [facility]    (Name & Tel number) [facility]  [phone]   Medications Reviewed with Next Provider of Service Yes   Transport Mode Ambulance   Level of Care Basic life support   Copies of Medical Records Sent History and Physical   Patient Belongings Disposition Sent with patient   Transfer Date 04/18/22   Transfer Time 1900      Follow-up Information    None       Patient's Medications    No medications on file     No discharge procedures on file         ED Provider  Electronically Signed by     Felipe Linn MD  04/18/22 2618

## 2022-04-18 NOTE — ED CARE HANDOFF
24 hour eval note - 04/18/2022    Subjective:  82-year-old male with hallucinations and violent behavior  His brother has filed a 36  Objective:  82-year-old male pacing around the room  Appears anxious and unable to relax    Assessment:  82-year-old male with a 302 for violent behavior    Plan:  Crisis continues to work on finding placement for the patient  Over the last 24 hours he has been more amenable to taking medication  We have worked on allowing time for rest   He still continues to piece that in the room and not sleep more than 2 hours at a time       Sharon Song DO  04/18/22 7114

## 2022-04-18 NOTE — ED NOTES
Pt taken to shower, toiletries provided and 1:1 observation maintained  New linens were provided to pt upon return to room  Pt given phone to speak to pt's mother after ambulating to the bathroom        Brenda Arroyo  04/18/22 8477

## 2022-04-18 NOTE — ED NOTES
Pt showering with 1:1 and security   Calm and cooperative at this time     Megan Chavira, RADHA  04/17/22 0910

## 2022-04-18 NOTE — ED NOTES
Pt ambulated to restroom and provided with apple/orange juice        Rosemarie [place]  04/18/22 4355

## 2022-04-18 NOTE — ED NOTES
Contact made with [facility] to discuss psych evaluation for patient  303 paperwork to be completed, and potential hearing for 4/19  May require a  provider, as physician would need to be available both today and tomorrow       Wil Taylor LMSW  04/18/22  8906

## 2022-04-18 NOTE — ED NOTES
Dr Lona Jordan is going to be completing the evaluation this morning, and will be available for a 303 hearing tomorrow  Paperwork will be sent to Dr Tony Breen directly for examining part to be completed      Anuja Fiore LMSW  04/18/22  3237

## 2022-04-18 NOTE — ED NOTES
Pt observed calm asleep in bed respirations easy/even/unlabored   1:1 maintained     Elder [facility]  04/17/22 3527

## 2022-04-18 NOTE — ED NOTES
Pt provided with telephone at this time to call his mother and brother       Marla Estrada  04/18/22 8995
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
## 2022-04-18 NOTE — ED NOTES
Pt has been requesting to use the rest room multiple time  During ambulation pt was difficult to redirect and was ambulating very slowly and made several stops to look at things and attempted to go into rooms such as room 3, family room and EMS room  When pt was in the rest room he was found playing with the shower and was cleaning the bathroom walls and floor which is was reminded not to do so when using the rest room  Pt was told that if he isn't able to follow directions when using the bathroom he will offered a urinal instead        Kamron Ramos  04/18/22 2551

## 2022-04-18 NOTE — ED NOTES
Pt ambulated to bathroom and back to room with ease  1:1 at bedside, will continue to monitor        Tate Chavez  04/18/22 2829

## 2022-04-18 NOTE — ED NOTES
Assumed care at this time  Pt is calm and cooperative  1:1 observation continues         Treasure Kennedy, RADHA  04/18/22 8920

## 2022-04-18 NOTE — ED NOTES
Pt requested a shower, accompanied by security and ED tech  To the shower room   1:1 maintained     Chelsea Snyder, [address] [place] [address]  04/17/22 1910

## 2022-04-18 NOTE — ED NOTES
Bed Search    Suzan:  No beds per Samuel Plascencia:  No beds per Mulugeta Credit:  Too acute per Milton Yuan  First:  Call at [address] per Meenakshi Hough:  No beds per [facility]:  No beds per Thor:  No appropriate beds per Kimani Shall:  Per Elizabeth Gonzalez, cannot take 302 signed before 4/17  PPI:  No beds per Brena Net:  Per Elizabeth Gonzalez, 302 too close to expiration

## 2022-04-18 NOTE — ED NOTES
Pt family member at the bedside, 1:1 shari Soto, [facility]  04/17/22 2023

## 2022-04-19 PROBLEM — F25.0 SCHIZOAFFECTIVE DISORDER, BIPOLAR TYPE WITH GOOD PROGNOSTIC FEATURES (HCC): Chronic | Status: ACTIVE | Noted: 2022-04-19

## 2022-04-19 PROCEDURE — 99222 1ST HOSP IP/OBS MODERATE 55: CPT | Performed by: PSYCHIATRY & NEUROLOGY

## 2022-04-19 RX ORDER — DIVALPROEX SODIUM 500 MG/1
1500 TABLET, EXTENDED RELEASE ORAL
Status: DISCONTINUED | OUTPATIENT
Start: 2022-04-19 | End: 2022-04-25

## 2022-04-19 RX ORDER — NICOTINE 21 MG/24HR
1 PATCH, TRANSDERMAL 24 HOURS TRANSDERMAL DAILY
Status: DISCONTINUED | OUTPATIENT
Start: 2022-04-19 | End: 2022-04-28 | Stop reason: HOSPADM

## 2022-04-19 RX ORDER — PALIPERIDONE 6 MG/1
6 TABLET, EXTENDED RELEASE ORAL EVERY EVENING
Status: COMPLETED | OUTPATIENT
Start: 2022-04-19 | End: 2022-04-23

## 2022-04-19 RX ADMIN — DIVALPROEX SODIUM 1500 MG: 500 TABLET, EXTENDED RELEASE ORAL at 22:29

## 2022-04-19 RX ADMIN — NICOTINE 1 PATCH: 21 PATCH, EXTENDED RELEASE TRANSDERMAL at 08:15

## 2022-04-19 RX ADMIN — CLONAZEPAM 0.5 MG: 0.5 TABLET ORAL at 08:15

## 2022-04-19 RX ADMIN — PALIPERIDONE 6 MG: 6 TABLET, EXTENDED RELEASE ORAL at 17:29

## 2022-04-19 RX ADMIN — NICOTINE POLACRILEX 2 MG: 2 GUM, CHEWING BUCCAL at 00:22

## 2022-04-19 RX ADMIN — THIAMINE HCL TAB 100 MG 100 MG: 100 TAB at 08:15

## 2022-04-19 RX ADMIN — FOLIC ACID 1 MG: 1 TABLET ORAL at 08:15

## 2022-04-19 RX ADMIN — TRAZODONE HYDROCHLORIDE 100 MG: 150 TABLET ORAL at 20:51

## 2022-04-19 RX ADMIN — OLANZAPINE 10 MG: 10 TABLET, ORALLY DISINTEGRATING ORAL at 08:15

## 2022-04-19 RX ADMIN — CLONAZEPAM 0.5 MG: 0.5 TABLET ORAL at 17:29

## 2022-04-19 RX ADMIN — Medication 1 TABLET: at 08:22
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
## 2022-04-19 NOTE — PROGRESS NOTES
04/19/22 1300   Team Meeting   Meeting Type Tx Team Meeting   Team Members Present   Team Members Present Physician;Nurse;   Physician Team Member Dr Sanjuana Cooley MD   Nursing Team Member Zenaida Cooper, RN   Care Management Team Member Peter Bergman, MS, [facility]   Patient/Family Present   Patient Present Yes   Patient's Family Present No   Reviewed [place] plan and goals, all in agreement and signed

## 2022-04-19 NOTE — SOCIAL WORK
CM spoke with transport team, PT transport is scheduled for 1500  CM received message from [employer] that PT is scheduled for 26th at 9:30 AM virtual that they will send email to PT for link into

## 2022-04-19 NOTE — NURSING NOTE
Travis Edgar was observed pacing the hallway  Pt is disheveled in appearance  Pt has poor boundries with staff and peers  Pt is disorganized and illogical in thought but follows redirection  Pt was hoarding food in room  Pt is bizarre but pleasant  Pt denies anxiety, depression, SI/HI/AVH  PT was complaint with select medications and asks to see wrappers before taking medications  Support offered  Will continue to monitor

## 2022-04-19 NOTE — SOCIAL WORK
Hearing Documentation    303 hearing held today;  in attendance:  Chacho Escalante - ;  [address] office; [address] Terese Lowe Coordinator; staff psych; ; petitioner;  pt in attendance;  [phone] recommendation upheld for up to an additional 19 days of inpt treatment at [facility];  [id number] 121 30 48: 5/8/22

## 2022-04-19 NOTE — NURSING NOTE
43 y/o here on a 302  History of bipolar and past inpatient stays   Patient Brought in from [facility] by wojciech  Patient currently in torn paper clothing, steady gait  Patient awake and alert  Patient awake and alert, cooperative and pleasant   Patient cooperative for assessment/interview  Pt forthcoming with information  Pt had good eye contact throughout interview  Denied auditory/visual hallucinations  Pt reports his mood as " feeling great" however has not been compliant or on any consistent medication regimen, pt expressed interest in a long acting injectable  Patient spoke about his brother inittiating his 36 however was smiling and taking picture with him the day before , patient expressed having a fiance whom he recently proposed to and is happy with  Patient denies anxiety and depression, denies AVH , Denies suicidal ideation   thoughts were logical and organized with pressured speech and detailed explanations  Skin check performed without difficulty , patient showered independently  Fluids and nourishment were offered/consumed without difficulty

## 2022-04-19 NOTE — SOCIAL WORK
CM placed call to PCP Dr Aj Weir,  [phone], left message informing of PT admission requesting return call  CM placed call to [facility] to notify of PT admission, left message requesting return call, [phone]

## 2022-04-19 NOTE — PLAN OF CARE
Problem: Alteration in Thoughts and Perception  Goal: Treatment Goal: Gain control of psychotic behaviors/thinking, reduce/eliminate presenting symptoms and demonstrate improved reality functioning upon discharge  Outcome: Progressing  Goal: Verbalize thoughts and feelings  Description: Interventions:  - Promote a nonjudgmental and trusting relationship with the patient through active listening and therapeutic communication  - Assess patient's level of functioning, behavior and potential for risk  - Engage patient in 1 on 1 interactions  - Encourage patient to express fears, feelings, frustrations, and discuss symptoms    - [place] patient to reality, help patient recognize reality-based thinking   - Administer medications as ordered and assess for potential side effects  - Provide the patient education related to the signs and symptoms of the illness and desired effects of prescribed medications  Outcome: Progressing  Goal: Refrain from acting on delusional thinking/internal stimuli  Description: Interventions:  - Monitor patient closely, per order   - Utilize least restrictive measures   - Set reasonable limits, give positive feedback for acceptable   - Administer medications as ordered and monitor of potential side effects  Outcome: Progressing  Goal: Agree to be compliant with medication regime, as prescribed and report medication side effects  Description: Interventions:  - Offer appropriate PRN medication and supervise ingestion; conduct AIMS, as needed   Outcome: Progressing  Goal: Attend and participate in unit activities, including therapeutic, recreational, and educational groups  Description: Interventions:  -Encourage Visitation and family involvement in care  Outcome: Progressing  Goal: Recognize dysfunctional thoughts, communicate reality-based thoughts at the time of discharge  Description: Interventions:  - Provide medication and psycho-education to assist patient in compliance and developing insight into his/her illness   Outcome: Progressing  Goal: Complete daily ADLs, including personal hygiene independently, as able  Description: Interventions:  - Observe, teach, and assist patient with ADLS  - Monitor and promote a balance of rest/activity, with adequate nutrition and elimination   Outcome: Progressing     Problem: Ineffective Coping  Goal: Cooperates with admission process  Description: Interventions:   - Complete admission process  Outcome: Progressing  Goal: Identifies ineffective coping skills  Outcome: Progressing  Goal: Identifies healthy coping skills  Outcome: Progressing  Goal: Demonstrates healthy coping skills  Outcome: Progressing  Goal: Participates in unit activities  Description: Interventions:  - Provide therapeutic environment   - Provide required programming   - Redirect inappropriate behaviors   Outcome: Progressing  Goal: Patient/Family participate in treatment and DC plans  Description: Interventions:  - Provide therapeutic environment  Outcome: Progressing  Goal: Patient/Family verbalizes awareness of resources  Outcome: Progressing  Goal: Understands least restrictive measures  Description: Interventions:  - Utilize least restrictive behavior  Outcome: Progressing  Goal: Free from restraint events  Description: - Utilize least restrictive measures   - Provide behavioral interventions   - Redirect inappropriate behaviors   Outcome: Progressing     Problem: Risk for Violence/Aggression Toward Others  Goal: Treatment Goal: Refrain from acts of violence/aggression during length of stay, and demonstrate improved impulse control at the time of discharge  Outcome: Progressing  Goal: Verbalize thoughts and feelings  Description: Interventions:  - Assess and re-assess patient's level of risk, every waking shift  - Engage patient in 1:1 interactions, daily, for a minimum of 15 minutes   - Allow patient to express feelings and frustrations in a safe and non-threatening manner   - Establish rapport/trust with patient   Outcome: Progressing  Goal: Refrain from harming others  Outcome: Progressing  Goal: Refrain from destructive acts on the environment or property  Outcome: Progressing  Goal: Control angry outbursts  Description: Interventions:  - Monitor patient closely, per order  - Ensure early verbal de-escalation  - Monitor prn medication needs  - Set reasonable/therapeutic limits, outline behavioral expectations, and consequences   - Provide a non-threatening milieu, utilizing the least restrictive interventions   Outcome: Progressing  Goal: Attend and participate in unit activities, including therapeutic, recreational, and educational groups  Description: Interventions:  - Provide therapeutic and educational activities daily, encourage attendance and participation, and document same in the medical record   Outcome: Progressing  Goal: Identify appropriate positive anger management techniques  Description: Interventions:  - Offer anger management and coping skills groups   - Staff will provide positive feedback for appropriate anger control  Outcome: Progressing     Problem: Alteration in Orientation  Goal: Treatment Goal: Demonstrate a reduction of confusion and improved orientation to person, place, time and/or situation upon discharge, according to optimum baseline/ability  Outcome: Progressing  Goal: Interact with staff daily  Description: Interventions:  - Assess and re-assess patient's level of orientation  - Engage patient in 1 on 1 interactions, daily, for a minimum of 15 minutes   - Establish rapport/trust with patient   Outcome: Progressing  Goal: Express concerns related to confused thinking related to:  Description: Interventions:  - Encourage patient to express feelings, fears, frustrations, hopes  - Assign consistent caregivers   - [place]/re-orient patient as needed  - Allow comfort items, as appropriate  - Provide visual cues, signs, etc    Outcome: Progressing  Goal: Allow medical examinations, as recommended  Description: Interventions:  - Provide physical/neurological exams and/or referrals, per provider   Outcome: Progressing  Goal: Cooperate with recommended testing/procedures  Description: Interventions:  - Determine need for ancillary testing  - Observe for mental status changes  - Implement falls/precaution protocol   Outcome: Progressing  Goal: Attend and participate in unit activities, including therapeutic, recreational, and educational groups  Description: Interventions:  - Provide therapeutic and educational activities daily, encourage attendance and participation, and document same in the medical record   - Provide appropriate opportunities for reminiscence   - Provide a consistent daily routine   - Encourage family contact/visitation   Outcome: Progressing  Goal: Complete daily ADLs, including personal hygiene independently, as able  Description: Interventions:  - Observe, teach, and assist patient with ADLS  Outcome: Progressing     Problem: Individualized Interventions  Goal: Patient will verbalize appropriate use of telephone within 5 days  Description: Interventions:  - Treatment team to determine use of supervised phone privileges   Outcome: Progressing  Goal: Patient will verbalize need for hospitalization and will no longer attempt elopement within 5 days  Description: Interventions:  - Ongoing education to help patient understand need for hospitalization  Outcome: Progressing  Goal: Patient will recognize inappropriate behaviors and develop alternative behaviors within 5 days  Description: Interventions:  - Patient in collaboration with Treatment Team will develop a behavior management plan to help identify effective coping skills to deal with stressors  Outcome: Progressing

## 2022-04-19 NOTE — PROGRESS NOTES
04/19/22 1000   Activity/Group Checklist   Group   (Communication Commuity Building Art Therapy )   Attendance Attended   Attendance Duration (min) Greater than 60   Interactions Disorganized interaction   Affect/Mood Wide   Goals Achieved Identified feelings; Identified triggers; Discussed coping strategies; Able to listen to others; Able to engage in interactions

## 2022-04-19 NOTE — PROGRESS NOTES
Patient belongings gone over by [employer] with the PT present      Belongings at bedside:  Sweatshirt  t-shirt  Sweat pants  Pair of socks    Items sent to storage:  Fishing reel  Towel  Glove  Rubber bracelet  Black neck gator  Light    Contraband items:  Cell phone  [address] to Security*
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
## 2022-04-20 LAB
25(OH)D3 SERPL-MCNC: 29.4 NG/ML (ref 30–100)
CHOLEST SERPL-MCNC: 150 MG/DL
FOLATE SERPL-MCNC: 16.6 NG/ML (ref 3.1–17.5)
HDLC SERPL-MCNC: 35 MG/DL
LDLC SERPL CALC-MCNC: 42 MG/DL (ref 0–100)
NONHDLC SERPL-MCNC: 115 MG/DL
TRIGL SERPL-MCNC: 366 MG/DL
VIT B12 SERPL-MCNC: 406 PG/ML (ref 100–900)

## 2022-04-20 PROCEDURE — 82607 VITAMIN B-12: CPT | Performed by: FAMILY MEDICINE

## 2022-04-20 PROCEDURE — 82306 VITAMIN D 25 HYDROXY: CPT | Performed by: FAMILY MEDICINE

## 2022-04-20 PROCEDURE — 80061 LIPID PANEL: CPT | Performed by: NURSE PRACTITIONER

## 2022-04-20 PROCEDURE — 82746 ASSAY OF FOLIC ACID SERUM: CPT | Performed by: FAMILY MEDICINE

## 2022-04-20 PROCEDURE — 99232 SBSQ HOSP IP/OBS MODERATE 35: CPT | Performed by: HOSPITALIST

## 2022-04-20 RX ORDER — MELATONIN
1000 DAILY
Status: DISCONTINUED | OUTPATIENT
Start: 2022-04-20 | End: 2022-04-28 | Stop reason: HOSPADM

## 2022-04-20 RX ORDER — LORAZEPAM 1 MG/1
1 TABLET ORAL ONCE
Status: COMPLETED | OUTPATIENT
Start: 2022-04-20 | End: 2022-04-20

## 2022-04-20 RX ADMIN — NICOTINE POLACRILEX 2 MG: 2 GUM, CHEWING BUCCAL at 15:42

## 2022-04-20 RX ADMIN — FOLIC ACID 1 MG: 1 TABLET ORAL at 08:08

## 2022-04-20 RX ADMIN — CYANOCOBALAMIN TAB 500 MCG 500 MCG: 500 TAB at 12:49

## 2022-04-20 RX ADMIN — Medication 1 TABLET: at 08:07

## 2022-04-20 RX ADMIN — CLONAZEPAM 0.5 MG: 0.5 TABLET ORAL at 17:21

## 2022-04-20 RX ADMIN — NICOTINE 1 PATCH: 21 PATCH, EXTENDED RELEASE TRANSDERMAL at 08:08

## 2022-04-20 RX ADMIN — Medication 1000 UNITS: at 12:48

## 2022-04-20 RX ADMIN — THIAMINE HCL TAB 100 MG 100 MG: 100 TAB at 08:08

## 2022-04-20 RX ADMIN — NICOTINE POLACRILEX 2 MG: 2 GUM, CHEWING BUCCAL at 12:49

## 2022-04-20 RX ADMIN — HALOPERIDOL 5 MG: 5 TABLET ORAL at 14:20

## 2022-04-20 RX ADMIN — BENZTROPINE MESYLATE 1 MG: 1 TABLET ORAL at 13:11

## 2022-04-20 RX ADMIN — NICOTINE POLACRILEX 2 MG: 2 GUM, CHEWING BUCCAL at 09:41

## 2022-04-20 RX ADMIN — LORAZEPAM 1 MG: 1 TABLET ORAL at 13:10

## 2022-04-20 RX ADMIN — PALIPERIDONE PALMITATE 234 MG: 234 INJECTION INTRAMUSCULAR at 09:49

## 2022-04-20 RX ADMIN — CLONAZEPAM 0.5 MG: 0.5 TABLET ORAL at 08:08

## 2022-04-20 RX ADMIN — HALOPERIDOL 5 MG: 5 TABLET ORAL at 08:08

## 2022-04-20 RX ADMIN — PALIPERIDONE 6 MG: 6 TABLET, EXTENDED RELEASE ORAL at 17:20

## 2022-04-20 NOTE — NURSING NOTE
Patient observed without sleep through the night per rounds  Mely Tsang walked the halls and colored  He remained quiet and controlled

## 2022-04-20 NOTE — NURSING NOTE
Pt was observed within the miliue throughout the shift  Pt is disheveled and changes clothing frequently  Pt is provoking toward staff and peers  Ant received PO PRN medication for a joyce score of 42  Pt was severely irritable and agitation about D/C  Pt is paranoid, delusional, and illogical  PRN medication was not effective  Pt denies anxiety, depression, SI/HI/AVH  Support offered  Will continue to monitor

## 2022-04-20 NOTE — PROGRESS NOTES
Progress Note - [address] 42 y o  male MRN: [id number]   Unit/Bed#: [facility] 220-01 Encounter: [id number]    Behavior over the last 24 hours: unchanged  Prudence Norse seen today, per staff report has been disorganized on the unit  On exam patient has rambling speech, appears to have increased energy  During the conversation patient shows this provider his T-shirt and states it is of the [place] devil  He then jumps and performs a twist   He has disconnected thought process with paranoid ideation as well as some grandiosity  Patient remains psychotic, disorganized and somewhat bizarre  No evidence of self-harm or harm of others  Sleep and appetite adequate  No reported side effects to medication      Mental Status Evaluation:    Appearance:  casually dressed, adequate grooming, looks stated age   Behavior:  calm, bizarre   Speech:  pressured, hypertalkative   Mood:  Expansive   Affect:  labile, increased in intensity   Thought Process:  disorganized, illogical   Associations: tangential associations   Thought Content:  grandiose and paranoid delusions   Perceptual Disturbances: no auditory hallucinations, no visual hallucinations   Risk Potential: Suicidal ideation - None at present  Homicidal ideation - None at present   Sensorium:  oriented to person, place and time/date   Memory:  recent and remote memory grossly intact   Consciousness:  alert and awake   Attention: decreased concentration and decreased attention span   Insight:  impaired   Judgment: impaired   Gait/Station: normal gait/station   Motor Activity: no abnormal movements     Vital signs in last 24 hours:    Temp:  [98 9 °F (37 2 °C)] 98 9 °F (37 2 °C)  HR:  [104-111] 104  Resp:  [16-18] 18  BP: (161-181)/() 181/93    Laboratory results: I have personally reviewed all pertinent laboratory/tests results          Assessment/Plan   Principal Problem:    Schizoaffective disorder, bipolar type with good prognostic features [facility])  Active Problems:    Bipolar I disorder, most recent episode manic, severe with psychotic features ([facility] )    Recommended Treatment:     Planned medication and treatment changes:  Continue Klonopin 0 5 mg b i d    Continue Depakote 1500 mg at bedtime  Continue Invega 6 mg in the p m , patient did receive Invega injection 234 mg on April 20th  All current active medications have been reviewed  Encourage group therapy, milieu therapy and occupational therapy  Behavioral Health checks every 7 minutes  Current Facility-Administered Medications   Medication Dose Route Frequency Provider Last Rate    acetaminophen  650 mg Oral Q6H PRN Bouchra Janus Medei, CRNP      acetaminophen  650 mg Oral Q4H PRN Bouchra Janus Medei, CRNP      acetaminophen  975 mg Oral Q6H PRN Bouchra Janus Medei, CRNP      aluminum-magnesium hydroxide-simethicone  30 mL Oral Q4H PRN Bouchra Janus Medei, CRNP      haloperidol lactate  2 5 mg Intramuscular Q6H PRN Max 4/day Staci M Medei, CRNP      And    LORazepam  1 mg Intramuscular Q6H PRN Max 4/day Bouchra Janus Medei, CRNP      And    benztropine  0 5 mg Intramuscular Q6H PRN Max 4/day Bouchra Janus Medei, CRNP      haloperidol lactate  5 mg Intramuscular Q4H PRN Max 4/day Denise Dadds M Medei, CRNP      And    LORazepam  2 mg Intramuscular Q4H PRN Max 4/day Bouchra Janus Medei, CRNP      And    benztropine  1 mg Intramuscular Q4H PRN Max 4/day Bouchra Janus Medei, CRNP      benztropine  1 mg Oral Q6H PRN Bouchra Janus Medei, CRNP      cholecalciferol  1,000 Units Oral Daily Derian Mauricio MD      clonazePAM  0 5 mg Oral BID Bouchra Janus Medei, CRNP      cyanocobalamin  500 mcg Oral Daily Derian Mauricio MD      hydrOXYzine HCL  50 mg Oral Q6H PRN Max 4/day Bouchra Janus Medei, CRNP      Or    diphenhydrAMINE  50 mg Intramuscular Q6H PRN Bouchra Janus Medei, CRNP      divalproex sodium  1,500 mg Oral KARMEN Lawson MD      folic acid  1 mg Oral Daily Derian Mauricio MD      haloperidol  2 mg Oral Q4H PRN Max 6/day Ellis Su, LISY      haloperidol  5 mg Oral Q6H PRN Max 4/day [facility] DR AIME FRANKLIN, LISY      haloperidol  5 mg Oral Q4H PRN Max 4/day Veronica Paragould Medei, CRNP      hydrOXYzine HCL  100 mg Oral Q6H PRN Max 4/day Veroncia Paragould HOS[facility] DR AIME FRANKLIN, LISY      Or    LORazepam  2 mg Intramuscular Q6H PRN Veronica Paragould Medei, CRNP      hydrOXYzine HCL  25 mg Oral Q6H PRN Max 4/day Veronica Paragould Medei, CRNP      multivitamin-minerals  1 tablet Oral Daily James Navarro MD      nicotine  1 patch Transdermal Daily James Navarro MD      nicotine polacrilex  2 mg Oral Q2H PRN James Navarro MD      paliperidone  6 mg Oral QPM Maggie Sadler MD      polyethylene glycol  17 g Oral Daily PRN Veronica Paragould Medei, CRNP      thiamine  100 mg Oral Daily James Navarro MD      traZODone  100 mg Oral HS PRN [facility], CRNP         Risks / Benefits of Treatment:    Risks, benefits, and possible side effects of medications explained to patient and patient verbalizes understanding and agreement for treatment  Counseling / Coordination of Care: Total floor / unit time spent today 25 minutes  Greater than 50% of total time was spent with the patient and / or family counseling and / or coordination of care  A description of counseling / coordination of care:  Patient's progress discussed with staff in treatment team meeting  Medications, treatment progress and treatment plan reviewed with patient      Rachel Ng MD 04/20/22

## 2022-04-20 NOTE — PROGRESS NOTES
04/20/22 1000   Activity/Group Checklist   Group   (Self Reflection Art Therapy Processing)   Attendance Attended   Attendance Duration (min) Greater than 60  (pt was in and out of group throughout)   Interactions Disorganized interaction   Affect/Mood Wide   Goals Achieved Identified feelings; Identified triggers; Able to listen to others; Able to engage in interactions

## 2022-04-20 NOTE — PROGRESS NOTES
04/20/22 0759   Activity/Group Checklist   Group   (Community Meeting Group and Processing)   Attendance Attended   Attendance Duration (min) 46-60   Interactions Disorganized interaction   Affect/Mood Wide   Goals Achieved Identified feelings; Able to listen to others; Able to engage in interactions

## 2022-04-20 NOTE — NURSING NOTE
Patient received on the unit awake and alert  Jennifer Sawyer is pleasant, calm at times and cooperative  Jennifer Sawyer had periods of bizarre behaviors this shift by frequently stomping at random times, making bird sounds and  food focused  Jennifer Sawyer denies anxiety and depression, denies suicidal ideation and denies A/V/H  Good medication compliance, fluid and nourishment during snack and frequently in between

## 2022-04-20 NOTE — TREATMENT PLAN
TREATMENT PLAN REVIEW - [address] 42 y o  1975 male MRN: [id number]    [address] [facility] [address] Room / Bed: Dennis Fabry 220/[facility] 868-51 Encounter: [id number]          Admit Date/Time:  4/18/2022  8:52 PM    Treatment Team: Attending Provider: Monico Larsen MD; Consulting Physician: Jorge L Miles MD; Patient Care Assistant: Mahendra Chen; Registered Nurse: Justine Sotomayor LPN; Patient Care Assistant: Julia Cooley; Registered Nurse: Kobe Davenport, RADHA; Certified Nursing Assistant: Honorio Sal; Patient Care Assistant: Russ Vu;  Care Manager: Lorne Trevizo RN; Patient Care Technician: Meena Young; Recreational Therapist: Yuri Ordaz    Diagnosis: Principal Problem:    Schizoaffective disorder, bipolar type with good prognostic features ([facility] )  Active Problems:    Bipolar I disorder, most recent episode manic, severe with psychotic features [facility])    Patient Strengths: average or above intelligence, capable of independent living, cooperative, communication skills, compliant with medication, good physical health, interpersonal skills, negotiates basic needs     Patient Barriers: chronic mental illness, difficulty adapting, patient is on an involuntary commitment    Short Term Goals: decrease in manic symptoms, improvement in reality testing, improvement in impulse control, sleep improvement, improvement in appetite    Long Term Goals: resolution of manic symptoms, stabilization of mood, resolution of psychotic symptoms, improved reality testing, improved impulse control, improved insight, no agitation on the unit, no aggressive behavior on the unit, acceptance of need for psychiatric medications, acceptance of need for psychiatric treatment, acceptance of need for psychiatric follow up after discharge, acceptance of psychiatric diagnosis, adequate self care, adequate sleep, adequate appetite, adequate oral intake, appropriate interaction with peers, appropriate interaction with family, stable living arrangements upon discharge    Progress Towards Goals: starting psychitric medications as prescribed    Recommended Treatment: medication management, patient medication education, group therapy, milieu therapy, continued Behavioral Health psychiatric evaluation/assessment process     Treatment Frequency: daily medication monitoring, group and milieu therapy daily, monitoring through interdisciplinary rounds, monitoring through weekly patient care conferences    Expected Discharge Date:  tbd    Discharge Plan: referral for outpatient medication management with a psychiatrist, referral for outpatient psychotherapy, referral to [address] [place] [address] Team for close psychiatric monitoring    Treatment Plan Created/Updated By: Kyung Veliz MD

## 2022-04-20 NOTE — PROGRESS NOTES
Progress Note - Adry Galvan 42 y o  male MRN: [id number]    Unit/Bed#: [facility] 220-01 Encounter: [id number]        Subjective:   Patient seen and examined at bedside after reviewing the chart and discussing the case with the caring staff  Patient examined at bedside  Patient has no acute issues  On review of patient's labs it was found that patient's vitamin-D level was low 29 4 and vitamin B12 level was also low 406  Physical Exam   Vitals: Blood pressure (!) 181/93, pulse 104, temperature 98 9 °F (37 2 °C), temperature source Temporal, resp  rate 18, height 5' 6" (1 676 m), weight 93 4 kg (206 lb), SpO2 96 %  ,Body mass index is 33 25 kg/m²  Constitutional: He appears well-developed  HEENT: PERR, EOMI, MMM  Cardiovascular: Normal rate and regular rhythm  Pulmonary/Chest: Effort normal and breath sounds normal    Abdomen: Soft, + BS, NT    Assessment/Plan:  Adry Galvan is a(n) 42y o  year old male with Bipolar Disorder     1  Cardiac with newly diagnosed Hypertension  I will put the patient on Hydrochlorothiazide 12 5 mg daily  2  Alcohol abuse  Patient has been put on thiamine, folic acid and multivitamin  3  Arthritis/headache  Patient may get Tylenol on as needed basis  4  Insomnia  Patient may get trazodone p r n  5  New vitamin-D deficiency  I will put the patient on vitamin-D supplements  6  New vitamin B12 deficiency  I will put the patient on vitamin B12 supplements

## 2022-04-21 PROCEDURE — 99232 SBSQ HOSP IP/OBS MODERATE 35: CPT | Performed by: PSYCHIATRY & NEUROLOGY

## 2022-04-21 PROCEDURE — 93005 ELECTROCARDIOGRAM TRACING: CPT

## 2022-04-21 RX ORDER — CLONAZEPAM 0.5 MG/1
0.25 TABLET ORAL 2 TIMES DAILY
Status: DISCONTINUED | OUTPATIENT
Start: 2022-04-21 | End: 2022-04-27

## 2022-04-21 RX ORDER — HYDROCHLOROTHIAZIDE 12.5 MG/1
12.5 TABLET ORAL DAILY
Status: DISCONTINUED | OUTPATIENT
Start: 2022-04-21 | End: 2022-04-26

## 2022-04-21 RX ADMIN — FOLIC ACID 1 MG: 1 TABLET ORAL at 09:31

## 2022-04-21 RX ADMIN — CYANOCOBALAMIN TAB 500 MCG 500 MCG: 500 TAB at 09:31

## 2022-04-21 RX ADMIN — DIVALPROEX SODIUM 1500 MG: 500 TABLET, EXTENDED RELEASE ORAL at 21:06

## 2022-04-21 RX ADMIN — THIAMINE HCL TAB 100 MG 100 MG: 100 TAB at 09:31

## 2022-04-21 RX ADMIN — NICOTINE POLACRILEX 2 MG: 2 GUM, CHEWING BUCCAL at 17:28

## 2022-04-21 RX ADMIN — CLONAZEPAM 0.5 MG: 0.5 TABLET ORAL at 09:31

## 2022-04-21 RX ADMIN — NICOTINE POLACRILEX 2 MG: 2 GUM, CHEWING BUCCAL at 20:27

## 2022-04-21 RX ADMIN — HYDROXYZINE HYDROCHLORIDE 100 MG: 50 TABLET, FILM COATED ORAL at 09:31

## 2022-04-21 RX ADMIN — PALIPERIDONE 6 MG: 6 TABLET, EXTENDED RELEASE ORAL at 17:07

## 2022-04-21 RX ADMIN — Medication 1 TABLET: at 09:31

## 2022-04-21 RX ADMIN — CLONAZEPAM 0.25 MG: 0.5 TABLET ORAL at 17:06

## 2022-04-21 RX ADMIN — NICOTINE POLACRILEX 2 MG: 2 GUM, CHEWING BUCCAL at 09:32

## 2022-04-21 RX ADMIN — HYDROCHLOROTHIAZIDE 12.5 MG: 12.5 TABLET ORAL at 11:57

## 2022-04-21 RX ADMIN — NICOTINE POLACRILEX 2 MG: 2 GUM, CHEWING BUCCAL at 11:50

## 2022-04-21 RX ADMIN — NICOTINE 1 PATCH: 21 PATCH, EXTENDED RELEASE TRANSDERMAL at 05:34

## 2022-04-21 RX ADMIN — Medication 1000 UNITS: at 09:31

## 2022-04-21 NOTE — PLAN OF CARE
Problem: Alteration in Thoughts and Perception  Goal: Treatment Goal: Gain control of psychotic behaviors/thinking, reduce/eliminate presenting symptoms and demonstrate improved reality functioning upon discharge  Outcome: Progressing  Goal: Verbalize thoughts and feelings  Description: Interventions:  - Promote a nonjudgmental and trusting relationship with the patient through active listening and therapeutic communication  - Assess patient's level of functioning, behavior and potential for risk  - Engage patient in 1 on 1 interactions  - Encourage patient to express fears, feelings, frustrations, and discuss symptoms    - [place] patient to reality, help patient recognize reality-based thinking   - Administer medications as ordered and assess for potential side effects  - Provide the patient education related to the signs and symptoms of the illness and desired effects of prescribed medications  Interventions:  - Assess and re-assess patient's level of risk   - Engage patient in 1:1 interactions, daily, for a minimum of 15 minutes   - Encourage patient to express feelings, fears, frustrations, hopes   Outcome: Progressing  Goal: Refrain from acting on delusional thinking/internal stimuli  Description: Interventions:  - Monitor patient closely, per order   - Utilize least restrictive measures   - Set reasonable limits, give positive feedback for acceptable   - Administer medications as ordered and monitor of potential side effects  Outcome: Progressing  Goal: Agree to be compliant with medication regime, as prescribed and report medication side effects  Description: Interventions:  - Offer appropriate PRN medication and supervise ingestion; conduct AIMS, as needed   Outcome: Progressing  Goal: Attend and participate in unit activities, including therapeutic, recreational, and educational groups  Description: Interventions:  -Encourage Visitation and family involvement in care  Interventions:  - Provide therapeutic and educational activities daily, encourage attendance and participation, and document same in the medical record   Outcome: Progressing  Goal: Recognize dysfunctional thoughts, communicate reality-based thoughts at the time of discharge  Description: Interventions:  - Provide medication and psycho-education to assist patient in compliance and developing insight into his/her illness   Outcome: Progressing  Goal: Complete daily ADLs, including personal hygiene independently, as able  Description: Interventions:  - Observe, teach, and assist patient with ADLS  - Monitor and promote a balance of rest/activity, with adequate nutrition and elimination   Interventions:  - Observe, teach, and assist patient with ADLS  -  Monitor and promote a balance of rest/activity, with adequate nutrition and elimination   Outcome: Progressing     Problem: Ineffective Coping  Goal: Cooperates with admission process  Description: Interventions:   - Complete admission process  Outcome: Progressing  Goal: Identifies ineffective coping skills  Outcome: Progressing  Goal: Identifies healthy coping skills  Outcome: Progressing  Goal: Demonstrates healthy coping skills  Outcome: Progressing  Goal: Participates in unit activities  Description: Interventions:  - Provide therapeutic environment   - Provide required programming   - Redirect inappropriate behaviors   Outcome: Progressing  Goal: Patient/Family participate in treatment and DC plans  Description: Interventions:  - Provide therapeutic environment  Outcome: Progressing  Goal: Patient/Family verbalizes awareness of resources  Outcome: Progressing  Goal: Understands least restrictive measures  Description: Interventions:  - Utilize least restrictive behavior  Outcome: Progressing  Goal: Free from restraint events  Description: - Utilize least restrictive measures   - Provide behavioral interventions   - Redirect inappropriate behaviors   Outcome: Progressing     Problem: Risk for Violence/Aggression Toward Others  Goal: Treatment Goal: Refrain from acts of violence/aggression during length of stay, and demonstrate improved impulse control at the time of discharge  Outcome: Progressing  Goal: Verbalize thoughts and feelings  Description: Interventions:  - Assess and re-assess patient's level of risk, every waking shift  - Engage patient in 1:1 interactions, daily, for a minimum of 15 minutes   - Allow patient to express feelings and frustrations in a safe and non-threatening manner   - Establish rapport/trust with patient   Outcome: Progressing  Goal: Refrain from harming others  Outcome: Progressing  Goal: Refrain from destructive acts on the environment or property  Outcome: Progressing  Goal: Control angry outbursts  Description: Interventions:  - Monitor patient closely, per order  - Ensure early verbal de-escalation  - Monitor prn medication needs  - Set reasonable/therapeutic limits, outline behavioral expectations, and consequences   - Provide a non-threatening milieu, utilizing the least restrictive interventions   Outcome: Progressing  Goal: Attend and participate in unit activities, including therapeutic, recreational, and educational groups  Description: Interventions:  - Provide therapeutic and educational activities daily, encourage attendance and participation, and document same in the medical record   Outcome: Progressing  Goal: Identify appropriate positive anger management techniques  Description: Interventions:  - Offer anger management and coping skills groups   - Staff will provide positive feedback for appropriate anger control  Outcome: Progressing     Problem: Alteration in Orientation  Goal: Treatment Goal: Demonstrate a reduction of confusion and improved orientation to person, place, time and/or situation upon discharge, according to optimum baseline/ability  Outcome: Progressing  Goal: Interact with staff daily  Description: Interventions:  - Assess and re-assess patient's level of orientation  - Engage patient in 1 on 1 interactions, daily, for a minimum of 15 minutes   - Establish rapport/trust with patient   Outcome: Progressing  Goal: Express concerns related to confused thinking related to:  Description: Interventions:  - Encourage patient to express feelings, fears, frustrations, hopes  - Assign consistent caregivers   - [place]/re-orient patient as needed  - Allow comfort items, as appropriate  - Provide visual cues, signs, etc    Outcome: Progressing  Goal: Allow medical examinations, as recommended  Description: Interventions:  - Provide physical/neurological exams and/or referrals, per provider   Outcome: Progressing  Goal: Cooperate with recommended testing/procedures  Description: Interventions:  - Determine need for ancillary testing  - Observe for mental status changes  - Implement falls/precaution protocol   Outcome: Progressing  Goal: Attend and participate in unit activities, including therapeutic, recreational, and educational groups  Description: Interventions:  - Provide therapeutic and educational activities daily, encourage attendance and participation, and document same in the medical record   - Provide appropriate opportunities for reminiscence   - Provide a consistent daily routine   - Encourage family contact/visitation   Outcome: Progressing  Goal: Complete daily ADLs, including personal hygiene independently, as able  Description: Interventions:  - Observe, teach, and assist patient with ADLS  Outcome: Progressing     Problem: Individualized Interventions  Goal: Patient will verbalize appropriate use of telephone within 5 days  Description: Interventions:  - Treatment team to determine use of supervised phone privileges   Outcome: Progressing  Goal: Patient will verbalize need for hospitalization and will no longer attempt elopement within 5 days  Description: Interventions:  - Ongoing education to help patient understand need for hospitalization  Outcome: Progressing  Goal: Patient will recognize inappropriate behaviors and develop alternative behaviors within 5 days  Description: Interventions:  - Patient in collaboration with Treatment Team will develop a behavior management plan to help identify effective coping skills to deal with stressors  Outcome: Progressing     Problem: Ineffective Coping  Goal: Participates in unit activities  Description: Interventions:  - Provide therapeutic environment   - Provide required programming   - Redirect inappropriate behaviors   Outcome: Progressing     Problem: DISCHARGE PLANNING - CARE MANAGEMENT  Goal: Discharge to post-acute care or home with appropriate resources  Description: INTERVENTIONS:  - Conduct assessment to determine patient/family and health care team treatment goals, and need for post-acute services based on payer coverage, community resources, and patient preferences, and barriers to discharge  - Address psychosocial, clinical, and financial barriers to discharge as identified in assessment in conjunction with the patient/family and health care team  - Arrange appropriate level of post-acute services according to patients   needs and preference and payer coverage in collaboration with the physician and health care team  - Communicate with and update the patient/family, physician, and health care team regarding progress on the discharge plan  - Arrange appropriate transportation to post-acute venues  Outcome: Progressing     Problem: Depression  Goal: Verbalize thoughts and feelings  Description: Interventions:  - Promote a nonjudgmental and trusting relationship with the patient through active listening and therapeutic communication  - Assess patient's level of functioning, behavior and potential for risk  - Engage patient in 1 on 1 interactions  - Encourage patient to express fears, feelings, frustrations, and discuss symptoms    - [place] patient to reality, help patient recognize reality-based thinking   - Administer medications as ordered and assess for potential side effects  - Provide the patient education related to the signs and symptoms of the illness and desired effects of prescribed medications  Interventions:  - Assess and re-assess patient's level of risk   - Engage patient in 1:1 interactions, daily, for a minimum of 15 minutes   - Encourage patient to express feelings, fears, frustrations, hopes   Outcome: Progressing  Goal: Attend and participate in unit activities, including therapeutic, recreational, and educational groups  Description: Interventions:  -Encourage Visitation and family involvement in care  Interventions:  - Provide therapeutic and educational activities daily, encourage attendance and participation, and document same in the medical record   Outcome: Progressing  Goal: Complete daily ADLs, including personal hygiene independently, as able  Description: Interventions:  - Observe, teach, and assist patient with ADLS  - Monitor and promote a balance of rest/activity, with adequate nutrition and elimination   Interventions:  - Observe, teach, and assist patient with ADLS  -  Monitor and promote a balance of rest/activity, with adequate nutrition and elimination   Outcome: Progressing  Goal: Treatment Goal: Demonstrate behavioral control of depressive symptoms, verbalize feelings of improved mood/affect, and adopt new coping skills prior to discharge  Outcome: Progressing  Goal: Refrain from harming self  Description: Interventions:  - Monitor patient closely, per order   - Supervise medication ingestion, monitor effects and side effects   Outcome: Progressing  Goal: Refrain from isolation  Description: Interventions:  - Develop a trusting relationship   - Encourage socialization   Outcome: Progressing  Goal: Refrain from self-neglect  Outcome: Progressing

## 2022-04-21 NOTE — PROGRESS NOTES
04/21/22 1430   Activity/Group Checklist   Group   (Pictionary Communication)   Attendance Attended   Attendance Duration (min) 46-60   Interactions Interacted appropriately   Affect/Mood Appropriate   Goals Achieved Identified feelings; Identified triggers; Discussed coping strategies; Able to listen to others; Able to engage in interactions

## 2022-04-21 NOTE — SOCIAL WORK
CM met with PT for PT check in  PT reflected on HX  Reviewed that he spoke with  and that he is not scheduled to D/C this week which he is upset about but that he verbalized he would "keep it together" and "do what I am suppose to do" in the meantime  Positive reinforcement provided  PT focuesd on injection and no oral medications  Compliance encouraged  PT receptive to conversation, controlled  CM informed PT that she was able to locate a  whom could participate via virtual meeting and that [address] would be reaching out to nurses station to schedule  PT happy to hear

## 2022-04-21 NOTE — PROGRESS NOTES
04/20/22 0830   Team Meeting   Meeting Type Daily Rounds   Team Members Present   Team Members Present Physician;Nurse;   Physician Team Member Dr Loretta De La Cruz MD; Silvio Sever, CRNP   Nursing Team Member Juan Caputo, RN   Care Management Team Member Ernie Silverio MS, [facility], [facility]   Patient/Family Present   Patient Present No   Patient's Family Present No   Bizarre, pacing, disheveled, started invega yesterday, starts injection today, monthly will be 234

## 2022-04-21 NOTE — NURSING NOTE
Narendra Blum was observed pacing the hallway  Pt is disheveled and changes clothing frequently throughout the shift  Pt is elevated in odd and pressured in speech at times  Pt is bizarre and hyper on unit  PT is overly bright and repetitive  Pt is intrusive with staff and peers  Pt is illogical and paranoid  Pt received PO PRN medication at beginning of shift with little effectiveness  Pt denies anxiety, depression, SI/HI/AVH  Support offered  Will continue to monitor

## 2022-04-21 NOTE — SOCIAL WORK
CM received response from Calin Donahue whom indicated that we do not yet have a staff  for the [place]  So requests are on a case by case basis and based on the need  You can tiger text our [address] and they will be able make the appropriate assessment  CM sent message via tiger text to lesia osborn, pending response

## 2022-04-21 NOTE — SOCIAL WORK
CM placed call to [address] [place] [address] left message requesting return call to discuss PT status, plan of care, and arrange for D/C planning

## 2022-04-21 NOTE — SOCIAL WORK
ABEL received voicemail from [employer] indicating he spoke with Mane Mann and she transferred him to [facility]  CM provided [employer] information to Mane Mann and requested to follow up with him to set up virtual visit with Thesirisha Powers for PT that works with teams schedule  Mane Mann in agreement  Marline Osborne' phone is [phone])

## 2022-04-21 NOTE — SOCIAL WORK
CM sent tiger text to on call  to inquire about setting up visit, received auto text no viola on duty, cm sent email to Rev Laurita Sanchez to inquire whom to reach out to in regards to arranging a spiritual visit with PT  Pending response

## 2022-04-21 NOTE — PROGRESS NOTES
Alcon Cuadra was mixing with his peers throughout the shift and was bright and cheerfully joking with a some  He became somewhat irritable when unable to get a phone call through to family and slammed the phone down several times  Behavior was called to his attention and was corrected  He is somewhat intrusive with staff and asks frequently for snacks  He denied ideations, hallucinations, anxiety and depression  No PRNs utilized and he slept for 6 hours

## 2022-04-21 NOTE — PROGRESS NOTES
04/21/22 0730   Activity/Group Checklist   Group   [employer])   Attendance Attended   Attendance Duration (min) 46-60   Interactions Disorganized interaction   Affect/Mood Wide   Goals Achieved Identified feelings; Discussed coping strategies; Able to listen to others; Able to engage in interactions

## 2022-04-21 NOTE — PROGRESS NOTES
Progress Note - [facility] 5 42 y o  male MRN: @MRN   Unit/Bed#: [facility] 220-01 Encounter: [id number]      Report from staff regarding this patient received and discussed, and records reviewed prior to seeing this patient  Behavior over the last 24 hours: some improvement  The patient has less intense psychomotor agitation visible in the unit, compliant with unit rules, no side effects of his 1st shot of [place]  The patient stated he wants to live with his mother and help her with R&Les after discharge because he does not want to go back to live with his brother  He still might have paranoid ideations toward his brother who committed him for treatment, and he reported hearing voices of people talking about him that he hears from the distance because he has "very good hearing"  He denied hearing the voices at the moment  The patient was in agreement with diagnosis of bipolar disorder  questions the diagnosis of schizoaffective disorder having poor insight into the nature of his psychotic symptoms but is aware of his mood swings  Patient remains appropriate, cooperative with session, cooperative with staff, delusional, distracted and doing slightly better today      Sleep: decreased  Appetite: fair  Medication side effects: No     Mental Status Evaluation:    Appearance:  casually dressed   Mood:  improved, anxious, irritable, euphoric   Affect: appropriate, reactive, less labile    Speech:  increased rate, pressured   Thought Content:  intrusive thoughts, negative thinking   Perceptual Disturbances: denies auditory hallucinations when asked   Risk Potential: Suicidal ideation - None, contracts for safety on the unit  Homicidal ideation - None  Potential for aggression - Not at present   Insight:  impaired   Judgment: improving and impaired   Motor Activity: no abnormal movements         Laboratory results:  I have personally reviewed all pertinent laboratory results  Progress Toward Goals: slight improvement    Assessment/Plan   Principal Problem:    Schizoaffective disorder, bipolar type with good prognostic features (HCC)  Active Problems:    Bipolar I disorder, most recent episode manic, severe with psychotic features ([address] )    Recommended Treatment:   Because of the patient initial slight improvement with some decrease in psychomotor agitation, will continue with Sabra Pimentel providing oral Invega currently  The patient did not take his Depakote, but the hope is with improving insight and judgment he may start taking it to speed up the resolution of his mood episode  Planned medication and treatment changes: All current active medications have been reviewed  Continue treatment with group therapy, milieu therapy, occupational therapy and medication management  ** Please Note: This note has been constructed using a voice recognition system  **      BMP: No results for input(s): NA, K, CL, CO2, BUN in the last 72 hours      Invalid input(s): CREA, GLU  Vitals:    04/21/22 1522   BP: 152/96   Pulse: (!) 114   Resp: 18   Temp: 98 9 °F (37 2 °C)   SpO2: 97%        Medication Administration - last 24 hours from 04/20/2022 1830 to 04/21/2022 1830       Date/Time Order Dose Route Action Action by     04/21/2022 0931 hydrOXYzine HCL (ATARAX) tablet 100 mg 100 mg Oral Given Kathryn Cushing, RN     04/21/2022 0931 LORazepam (ATIVAN) injection 2 mg   Intramuscular See Alternative Kathryn Cushing, RN     04/21/2022 0931 clonazePAM (KlonoPIN) tablet 0 5 mg 0 5 mg Oral Given Kathryn Cushing, RN     04/21/2022 6053 thiamine tablet 100 mg 100 mg Oral Given Kathryn Cushing, RN     85/41/3051 1579 folic acid (FOLVITE) tablet 1 mg 1 mg Oral Given Kathryn Cushing, RN     04/21/2022 0931 multivitamin-minerals (CENTRUM) tablet 1 tablet 1 tablet Oral Given Kathryn Cushing, RN     04/21/2022 1728 nicotine polacrilex (NICORETTE) gum 2 mg 2 mg Oral Given Kathryn Cushing, RN     04/21/2022 1150 nicotine polacrilex (NICORETTE) gum 2 mg 2 mg Oral Given Consuelo Line, RN     04/21/2022 0932 nicotine polacrilex (NICORETTE) gum 2 mg 2 mg Oral Given Consuelo Line, RN     04/21/2022 0931 nicotine (NICODERM CQ) 21 mg/24 hr TD 24 hr patch 1 patch 1 patch Transdermal Not Given Consuelo Line, RN     04/21/2022 0930 nicotine (NICODERM CQ) 21 mg/24 hr TD 24 hr patch 1 patch 1 patch Transdermal Patch Removed Consuelo Line, RN     04/21/2022 0534 nicotine (NICODERM CQ) 21 mg/24 hr TD 24 hr patch 1 patch 1 patch Transdermal Medication Applied Debbie Villarreal, RN     04/21/2022 1707 paliperidone (INVEGA) 24 hr tablet 6 mg 6 mg Oral Given Consuelo Line, RN     04/20/2022 2209 divalproex sodium (DEPAKOTE ER) 24 hr tablet 1,500 mg 1,500 mg Oral Refused Debbie Villarreal, RN     04/21/2022 9889 cholecalciferol (VITAMIN D3) tablet 1,000 Units 1,000 Units Oral Given Consuelo Line, RN     04/21/2022 0711 cyanocobalamin (VITAMIN B-12) tablet 500 mcg 500 mcg Oral Given Consuelo Line, RN     04/21/2022 1706 clonazePAM (KlonoPIN) tablet 0 25 mg 0 25 mg Oral Given Consuelo Line, RN     04/21/2022 1157 hydrochlorothiazide (HYDRODIURIL) tablet 12 5 mg 12 5 mg Oral Given Consuelo Line, RN

## 2022-04-21 NOTE — PROGRESS NOTES
Progress Note - Lois Carlos 42 y o  male MRN: [id number]    Unit/Bed#: [facility] 220-01 Encounter: [id number]        Subjective:   Patient seen and examined at bedside after reviewing the chart and discussing the case with the caring staff  Patient examined at bedside  Patient has no acute complaints  Physical Exam   Vitals: Blood pressure 167/95, pulse (!) 118, temperature 99 1 °F (37 3 °C), temperature source Temporal, resp  rate 18, height 5' 6" (1 676 m), weight 93 4 kg (206 lb), SpO2 97 %  ,Body mass index is 33 25 kg/m²  Constitutional: Patient appears well-developed  HEENT: PERR, EOMI, MMM  Cardiovascular: Normal rate and regular rhythm  Pulmonary/Chest: Effort normal and breath sounds normal    Abdomen: Soft, + BS, NT  Assessment/Plan:  Lois Carlos is a(n) 42y o  year old male with bipolar disorder      1  Cardiac with newly diagnosed hypertension  Patient started on HCTZ 12 5 mg daily on 4/21/2022  Patient's BP this morning 167/95 mmHg  2  Alcohol abuse  Patient has been put on thiamine, folic acid and multivitamin  3  Arthritis/headache  Patient may get Tylenol on as needed basis  4  Insomnia  Patient may get trazodone as needed  5  Vitamin-D deficiency  Patient started on vitamin-D supplements  6  Vitamin B12 deficiency  Patient started on vitamin B12 supplements  The patient was discussed with Dr Stephy Deshpande and he is in agreement with the above note

## 2022-04-21 NOTE — SOCIAL WORK
CM received response from [facility] [address] via tiger text whom indicated that he will have Alma Osborne conduct virtual visit with PT and he will have him call nurses station to schedule virtual appointment

## 2022-04-21 NOTE — PROGRESS NOTES
04/21/22 0830   Team Meeting   Meeting Type Daily Rounds   Team Members Present   Team Members Present Physician;Nurse;   Physician Team Member Dr Lula Obrien MD; Amparo Calero, [address] [place] [address]   Nursing Team Member Raimundo Hurd, RADHA   Care Management Team Member Leno Saucedo MS [facility], [facility]   Patient/Family Present   Patient Present No   Patient's Family Present No   LLI yesterday, social, hyperverbal, intrusive, lability, prns, much encouragement, pounding on walls, refused Depakote, reviewed labs, slept 6 hours, 2nd dose of invega due on Sunday 156mg

## 2022-04-21 NOTE — PROGRESS NOTES
04/21/22 1000   Activity/Group Checklist   Group   (Remote Control of Your Life)   Attendance Did not attend  (AT Group offered, PT declined)

## 2022-04-22 LAB
ATRIAL RATE: 103 BPM
ATRIAL RATE: 99 BPM
P AXIS: 55 DEGREES
P AXIS: 62 DEGREES
PR INTERVAL: 132 MS
PR INTERVAL: 138 MS
QRS AXIS: 43 DEGREES
QRS AXIS: 64 DEGREES
QRSD INTERVAL: 80 MS
QRSD INTERVAL: 80 MS
QT INTERVAL: 336 MS
QT INTERVAL: 336 MS
QTC INTERVAL: 431 MS
QTC INTERVAL: 440 MS
T WAVE AXIS: 38 DEGREES
T WAVE AXIS: 39 DEGREES
VENTRICULAR RATE: 103 BPM
VENTRICULAR RATE: 99 BPM

## 2022-04-22 PROCEDURE — 93010 ELECTROCARDIOGRAM REPORT: CPT | Performed by: INTERNAL MEDICINE

## 2022-04-22 PROCEDURE — 99232 SBSQ HOSP IP/OBS MODERATE 35: CPT | Performed by: HOSPITALIST

## 2022-04-22 RX ADMIN — METOPROLOL TARTRATE 12.5 MG: 25 TABLET ORAL at 21:40

## 2022-04-22 RX ADMIN — NICOTINE 1 PATCH: 21 PATCH, EXTENDED RELEASE TRANSDERMAL at 05:45

## 2022-04-22 RX ADMIN — PALIPERIDONE 6 MG: 6 TABLET, EXTENDED RELEASE ORAL at 17:53

## 2022-04-22 RX ADMIN — NICOTINE POLACRILEX 2 MG: 2 GUM, CHEWING BUCCAL at 21:31

## 2022-04-22 RX ADMIN — NICOTINE 1 PATCH: 21 PATCH, EXTENDED RELEASE TRANSDERMAL at 10:17

## 2022-04-22 RX ADMIN — CLONAZEPAM 0.25 MG: 0.5 TABLET ORAL at 17:53

## 2022-04-22 RX ADMIN — CLONAZEPAM 0.25 MG: 0.5 TABLET ORAL at 09:59

## 2022-04-22 RX ADMIN — THIAMINE HCL TAB 100 MG 100 MG: 100 TAB at 09:59

## 2022-04-22 RX ADMIN — DIVALPROEX SODIUM 1500 MG: 500 TABLET, EXTENDED RELEASE ORAL at 21:30

## 2022-04-22 RX ADMIN — HYDROCHLOROTHIAZIDE 12.5 MG: 12.5 TABLET ORAL at 09:59

## 2022-04-22 RX ADMIN — CYANOCOBALAMIN TAB 500 MCG 500 MCG: 500 TAB at 09:59

## 2022-04-22 RX ADMIN — METOPROLOL TARTRATE 12.5 MG: 25 TABLET ORAL at 11:56

## 2022-04-22 RX ADMIN — NICOTINE POLACRILEX 2 MG: 2 GUM, CHEWING BUCCAL at 17:53

## 2022-04-22 RX ADMIN — Medication 1000 UNITS: at 09:59

## 2022-04-22 RX ADMIN — FOLIC ACID 1 MG: 1 TABLET ORAL at 09:59

## 2022-04-22 RX ADMIN — NICOTINE POLACRILEX 2 MG: 2 GUM, CHEWING BUCCAL at 23:53

## 2022-04-22 RX ADMIN — Medication 1 TABLET: at 09:59
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
## 2022-04-22 NOTE — PROGRESS NOTES
04/22/22 8979   Activity/Group Checklist   Group   (Strengths in the [facility] Processing)   Attendance Attended   Attendance Duration (min) 31-45   Interactions Disorganized interaction  (hyperverbal)   Affect/Mood Wide  (pt did require redirection throughout)   Goals Achieved Identified feelings; Able to listen to others; Able to engage in interactions

## 2022-04-22 NOTE — NURSING NOTE
Paula Kurtz was on the unit during the waking part of the unit  He gravitates to the nurses station and frequently asks for extra snacks and competes with other patients for staff attention  He was dominating the patient phones and his family made frequent calls to the nursing station  They were given patient phone numbers  Several times he slammed the phones down and had to be reminded of courtesy toward other patients  Paula Kurtz also needed reminding to keep his shirt on  He is redirectable but reverts back to his behaviors after a short time  He was up several times in the night and had to be reminded that others were sleeping due to attention seeking noises  He did deny ideations and hallucination, anxiety and depression  No PRN given

## 2022-04-22 NOTE — PROGRESS NOTES
Progress Note - Jennifer Hannon 42 y o  male MRN: [id number]    Unit/Bed#: [facility] 220-01 Encounter: [id number]        Subjective:   Patient seen and examined at bedside after reviewing the chart and discussing the case with the caring staff  Patient examined at bedside  Patient has no acute complaints  His BP continues to be elevated along with HR elevated over 100 bpm     Physical Exam   Vitals: Blood pressure 151/97, pulse (!) 120, temperature 98 3 °F (36 8 °C), temperature source Temporal, resp  rate 18, height 5' 6" (1 676 m), weight 93 4 kg (206 lb), SpO2 97 %  ,Body mass index is 33 25 kg/m²  Constitutional: Patient appears well-developed  HEENT: PERR, EOMI, MMM  Cardiovascular: Normal rate and regular rhythm  Pulmonary/Chest: Effort normal and breath sounds normal    Abdomen: Soft, + BS, NT  Assessment/Plan:  Jennifer Hannon is a(n) 42y o  year old male with bipolar disorder      1  Cardiac with newly diagnosed hypertension, tachycardia  Patient started on HCTZ 12 5 mg daily on 4/21/2022  Patient's BP this morning 151/97 mmHg  His HR has been elevated above 100 bpm   Will start patient on metoprolol tartrate 12 5 mg twice daily  2  Alcohol abuse  Patient has been put on thiamine, folic acid and multivitamin  3  Arthritis/headache  Patient may get Tylenol on as needed basis  4  Insomnia  Patient may get trazodone as needed  5  Vitamin-D deficiency  Patient started on vitamin-D supplements  6  Vitamin B12 deficiency  Patient started on vitamin B12 supplements  The patient was discussed with Dr Austin Myers and he is in agreement with the above note

## 2022-04-23 PROCEDURE — 99232 SBSQ HOSP IP/OBS MODERATE 35: CPT | Performed by: NURSE PRACTITIONER

## 2022-04-23 RX ADMIN — METOPROLOL TARTRATE 12.5 MG: 25 TABLET ORAL at 08:08

## 2022-04-23 RX ADMIN — NICOTINE POLACRILEX 2 MG: 2 GUM, CHEWING BUCCAL at 08:12

## 2022-04-23 RX ADMIN — NICOTINE POLACRILEX 2 MG: 2 GUM, CHEWING BUCCAL at 06:01

## 2022-04-23 RX ADMIN — METOPROLOL TARTRATE 12.5 MG: 25 TABLET ORAL at 20:38

## 2022-04-23 RX ADMIN — HYDROCHLOROTHIAZIDE 12.5 MG: 12.5 TABLET ORAL at 08:13

## 2022-04-23 RX ADMIN — FOLIC ACID 1 MG: 1 TABLET ORAL at 08:13

## 2022-04-23 RX ADMIN — CLONAZEPAM 0.25 MG: 0.5 TABLET ORAL at 08:08

## 2022-04-23 RX ADMIN — CLONAZEPAM 0.25 MG: 0.5 TABLET ORAL at 19:05

## 2022-04-23 RX ADMIN — CYANOCOBALAMIN TAB 500 MCG 500 MCG: 500 TAB at 08:14

## 2022-04-23 RX ADMIN — THIAMINE HCL TAB 100 MG 100 MG: 100 TAB at 08:11

## 2022-04-23 RX ADMIN — PALIPERIDONE 6 MG: 6 TABLET, EXTENDED RELEASE ORAL at 19:06

## 2022-04-23 RX ADMIN — Medication 1000 UNITS: at 09:05

## 2022-04-23 RX ADMIN — NICOTINE POLACRILEX 2 MG: 2 GUM, CHEWING BUCCAL at 12:49

## 2022-04-23 RX ADMIN — Medication 1 TABLET: at 08:11

## 2022-04-23 RX ADMIN — NICOTINE POLACRILEX 2 MG: 2 GUM, CHEWING BUCCAL at 20:33

## 2022-04-23 RX ADMIN — NICOTINE 1 PATCH: 21 PATCH, EXTENDED RELEASE TRANSDERMAL at 08:12

## 2022-04-23 NOTE — PLAN OF CARE
Problem: Alteration in Thoughts and Perception  Goal: Treatment Goal: Gain control of psychotic behaviors/thinking, reduce/eliminate presenting symptoms and demonstrate improved reality functioning upon discharge  Outcome: Progressing  Goal: Verbalize thoughts and feelings  Description: Interventions:  - Promote a nonjudgmental and trusting relationship with the patient through active listening and therapeutic communication  - Assess patient's level of functioning, behavior and potential for risk  - Engage patient in 1 on 1 interactions  - Encourage patient to express fears, feelings, frustrations, and discuss symptoms    - [place] patient to reality, help patient recognize reality-based thinking   - Administer medications as ordered and assess for potential side effects  - Provide the patient education related to the signs and symptoms of the illness and desired effects of prescribed medications  Interventions:  - Assess and re-assess patient's level of risk   - Engage patient in 1:1 interactions, daily, for a minimum of 15 minutes   - Encourage patient to express feelings, fears, frustrations, hopes   Outcome: Progressing  Goal: Refrain from acting on delusional thinking/internal stimuli  Description: Interventions:  - Monitor patient closely, per order   - Utilize least restrictive measures   - Set reasonable limits, give positive feedback for acceptable   - Administer medications as ordered and monitor of potential side effects  Outcome: Progressing  Goal: Agree to be compliant with medication regime, as prescribed and report medication side effects  Description: Interventions:  - Offer appropriate PRN medication and supervise ingestion; conduct AIMS, as needed   Outcome: Progressing  Goal: Attend and participate in unit activities, including therapeutic, recreational, and educational groups  Description: Interventions:  -Encourage Visitation and family involvement in care  Interventions:  - Provide therapeutic and educational activities daily, encourage attendance and participation, and document same in the medical record   Outcome: Progressing  Goal: Recognize dysfunctional thoughts, communicate reality-based thoughts at the time of discharge  Description: Interventions:  - Provide medication and psycho-education to assist patient in compliance and developing insight into his/her illness   Outcome: Progressing  Goal: Complete daily ADLs, including personal hygiene independently, as able  Description: Interventions:  - Observe, teach, and assist patient with ADLS  - Monitor and promote a balance of rest/activity, with adequate nutrition and elimination   Interventions:  - Observe, teach, and assist patient with ADLS  -  Monitor and promote a balance of rest/activity, with adequate nutrition and elimination   Outcome: Progressing     Problem: Risk for Violence/Aggression Toward Others  Goal: Treatment Goal: Refrain from acts of violence/aggression during length of stay, and demonstrate improved impulse control at the time of discharge  Outcome: Progressing  Goal: Verbalize thoughts and feelings  Description: Interventions:  - Assess and re-assess patient's level of risk, every waking shift  - Engage patient in 1:1 interactions, daily, for a minimum of 15 minutes   - Allow patient to express feelings and frustrations in a safe and non-threatening manner   - Establish rapport/trust with patient   Outcome: Progressing  Goal: Refrain from harming others  Outcome: Progressing  Goal: Refrain from destructive acts on the environment or property  Outcome: Progressing  Goal: Control angry outbursts  Description: Interventions:  - Monitor patient closely, per order  - Ensure early verbal de-escalation  - Monitor prn medication needs  - Set reasonable/therapeutic limits, outline behavioral expectations, and consequences   - Provide a non-threatening milieu, utilizing the least restrictive interventions   Outcome: Progressing  Goal: Attend and participate in unit activities, including therapeutic, recreational, and educational groups  Description: Interventions:  - Provide therapeutic and educational activities daily, encourage attendance and participation, and document same in the medical record   Outcome: Progressing  Goal: Identify appropriate positive anger management techniques  Description: Interventions:  - Offer anger management and coping skills groups   - Staff will provide positive feedback for appropriate anger control  Outcome: Progressing     Problem: Alteration in Orientation  Goal: Treatment Goal: Demonstrate a reduction of confusion and improved orientation to person, place, time and/or situation upon discharge, according to optimum baseline/ability  Outcome: Progressing  Goal: Interact with staff daily  Description: Interventions:  - Assess and re-assess patient's level of orientation  - Engage patient in 1 on 1 interactions, daily, for a minimum of 15 minutes   - Establish rapport/trust with patient   Outcome: Progressing  Goal: Express concerns related to confused thinking related to:  Description: Interventions:  - Encourage patient to express feelings, fears, frustrations, hopes  - Assign consistent caregivers   - [place]/re-orient patient as needed  - Allow comfort items, as appropriate  - Provide visual cues, signs, etc    Outcome: Progressing  Goal: Allow medical examinations, as recommended  Description: Interventions:  - Provide physical/neurological exams and/or referrals, per provider   Outcome: Progressing  Goal: Cooperate with recommended testing/procedures  Description: Interventions:  - Determine need for ancillary testing  - Observe for mental status changes  - Implement falls/precaution protocol   Outcome: Progressing  Goal: Attend and participate in unit activities, including therapeutic, recreational, and educational groups  Description: Interventions:  - Provide therapeutic and educational activities daily, encourage attendance and participation, and document same in the medical record   - Provide appropriate opportunities for reminiscence   - Provide a consistent daily routine   - Encourage family contact/visitation   Outcome: Progressing  Goal: Complete daily ADLs, including personal hygiene independently, as able  Description: Interventions:  - Observe, teach, and assist patient with ADLS  Outcome: Progressing

## 2022-04-23 NOTE — PROGRESS NOTES
Progress Note - [address] 42 y o  male MRN: [id number]  Unit/Bed#: [facility] 220-01 Encounter: [id number]    Assessment/Plan   Principal Problem:    Schizoaffective disorder, bipolar type with good prognostic features ([facility] [address] )  Active Problems:    Bipolar I disorder, most recent episode manic, severe with psychotic features ([facility] [address] )      Behavior over the last 24 hours:  improved  Sleep:  Intermittent  Appetite: normal  Medication side effects: No  ROS: no complaints and all other systems are negative    Delsie Dawley was seen this morning for psychiatric follow-up  He was resting calmly in bed and reports he was increasingly aggravated last evening after, talking to my old lady " No PRN medications required; able to deescalate by talking with staff  Reports feeling much better now" and mood has been controlled with no episodes of agitation or aggression today  Limit-setting established by staff and we discussed the importance of refraining from over stimulating phone calls; patient receptive  Reports mild anxiety and denies depression/AVH/SI/HI  Continues to exhibit low frustration tolerance, but redirectable  Has been compliant with medications and meals  Sleep was intermittent throughout the night due to agitation, however patient encourage to utilize PRN trazodone should he be unable to sleep      Mental Status Evaluation:  Appearance:  age appropriate and casually dressed   Behavior:  Cooperative, calm, redirectable, bizarre at times   Speech:  normal pitch and normal volume   Mood:  constricted   Affect:  mood-congruent and redirectable   Thought Process:  circumstantial   Thought Content:  Less paranoid   Perceptual Disturbances: Denies AVH, does not appear internally preoccupied   Risk Potential: Suicidal Ideations none  Homicidal Ideations none  Potential for Aggression Yes due to mood lability and low frustration tolerance   Sensorium:  person, place and time/date   Memory:  recent and remote memory grossly intact   Consciousness:  alert and awake    Attention: attention span appeared shorter than expected for age   Insight:  limited   Judgment: limited   Gait/Station: normal gait/station and normal balance   Motor Activity: no abnormal movements     Progress Toward Goals:  Some improvement  Less paranoid, less bizarre, less grandiose  Mood controlled and redirectable  Due for Invega Sustenna 156 mg IM tomorrow, 04/24/2022  Will continue with remainder psychotropic regimen and discontinue PO invega once injection received  Compliant with medications  Will obtain VPA level 04/25/2022  No discharge date at this time  Recommended Treatment: Continue with group therapy, milieu therapy and occupational therapy  Risks, benefits and possible side effects of Medications:   Ayden Ryan has limited understanding of risks versus benefits of medications, but agrees to take as prescribed      Medications:   all current active meds have been reviewed, continue current psychiatric medications and current meds:   Current Facility-Administered Medications   Medication Dose Route Frequency    acetaminophen (TYLENOL) tablet 650 mg  650 mg Oral Q6H PRN    acetaminophen (TYLENOL) tablet 650 mg  650 mg Oral Q4H PRN    acetaminophen (TYLENOL) tablet 975 mg  975 mg Oral Q6H PRN    aluminum-magnesium hydroxide-simethicone (MYLANTA) oral suspension 30 mL  30 mL Oral Q4H PRN    haloperidol lactate (HALDOL) injection 2 5 mg  2 5 mg Intramuscular Q6H PRN Max 4/day    And    LORazepam (ATIVAN) injection 1 mg  1 mg Intramuscular Q6H PRN Max 4/day    And    benztropine (COGENTIN) injection 0 5 mg  0 5 mg Intramuscular Q6H PRN Max 4/day    haloperidol lactate (HALDOL) injection 5 mg  5 mg Intramuscular Q4H PRN Max 4/day    And    LORazepam (ATIVAN) injection 2 mg  2 mg Intramuscular Q4H PRN Max 4/day    And    benztropine (COGENTIN) injection 1 mg  1 mg Intramuscular Q4H PRN Max 4/day    benztropine (COGENTIN) tablet 1 mg  1 mg Oral Q6H PRN    cholecalciferol (VITAMIN D3) tablet 1,000 Units  1,000 Units Oral Daily    clonazePAM (KlonoPIN) tablet 0 25 mg  0 25 mg Oral BID    cyanocobalamin (VITAMIN B-12) tablet 500 mcg  500 mcg Oral Daily    hydrOXYzine HCL (ATARAX) tablet 50 mg  50 mg Oral Q6H PRN Max 4/day    Or    diphenhydrAMINE (BENADRYL) injection 50 mg  50 mg Intramuscular Q6H PRN    divalproex sodium (DEPAKOTE ER) 24 hr tablet 1,500 mg  1,500 mg Oral HS    folic acid (FOLVITE) tablet 1 mg  1 mg Oral Daily    haloperidol (HALDOL) tablet 2 mg  2 mg Oral Q4H PRN Max 6/day    haloperidol (HALDOL) tablet 5 mg  5 mg Oral Q6H PRN Max 4/day    haloperidol (HALDOL) tablet 5 mg  5 mg Oral Q4H PRN Max 4/day    hydrochlorothiazide (HYDRODIURIL) tablet 12 5 mg  12 5 mg Oral Daily    hydrOXYzine HCL (ATARAX) tablet 100 mg  100 mg Oral Q6H PRN Max 4/day    Or    LORazepam (ATIVAN) injection 2 mg  2 mg Intramuscular Q6H PRN    hydrOXYzine HCL (ATARAX) tablet 25 mg  25 mg Oral Q6H PRN Max 4/day    metoprolol tartrate (LOPRESSOR) tablet 12 5 mg  12 5 mg Oral Q12H [address]    multivitamin-minerals (CENTRUM) tablet 1 tablet  1 tablet Oral Daily    nicotine (NICODERM CQ) 21 mg/24 hr TD 24 hr patch 1 patch  1 patch Transdermal Daily    nicotine polacrilex (NICORETTE) gum 2 mg  2 mg Oral Q2H PRN    paliperidone (INVEGA) 24 hr tablet 6 mg  6 mg Oral QPM    [START ON 4/25/2022] paliperidone palmitate ER (INVEGA SUSTENNA) IM injection 156 mg  156 mg Intramuscular Once    polyethylene glycol (MIRALAX) packet 17 g  17 g Oral Daily PRN    thiamine tablet 100 mg  100 mg Oral Daily    traZODone (DESYREL) tablet 100 mg  100 mg Oral HS PRN     Labs: I have personally reviewed all pertinent laboratory/tests results  Counseling / Coordination of Care  Total floor / unit time spent today 25 minutes  Greater than 50% of total time was spent with the patient and / or family counseling and / or coordination of care   A description of the counseling / coordination of care:  Medication education, treatment plan, supportive therapy

## 2022-04-23 NOTE — NURSING NOTE
Pt was observed on the unit pacing the hallways and at the nurse's station  At this time, pt denies AH, VH, SI, HI, anxiety and depression  Pt was in need of frequent redirection  Pt was observed aggressively shadow boxing and was told that this is not allowed  Overnight, pt was told multiple times not more snacks and soda  Pt became angry, attempted to staff split  Pt was compliant with and tolerated all medications and treatments without stated complaint  Will continue to support and monitor with Q7 checks

## 2022-04-23 NOTE — PROGRESS NOTES
Patient had additional belongings brought to [facility]  This T inventoried patient's belongings with patient present  This T and patient signed a personal belongings checklist to confirm belongings   Additional patient belongings listed below:      Belongings to remain with patient:    4x underwear  4x sock pairs  3x t-shirts  2x pants  2x sandals/flip flops      Belongings to remain in storage:    2x shirts  8x sock pairs  1x work pants  3x collectible figures

## 2022-04-23 NOTE — PROGRESS NOTES
Progress Note - Adry Galvan 42 y o  male MRN: [id number]    Unit/Bed#: [facility] 220-01 Encounter: [id number]        Subjective:   Patient seen and examined at bedside after reviewing the chart and discussing the case with the caring staff  Patient examined at bedside  Patient has no acute complaints  His BP continues to be elevated along with HR elevated over 100 bpm     Physical Exam   Vitals: Blood pressure 155/76, pulse (!) 121, temperature 98 5 °F (36 9 °C), temperature source Temporal, resp  rate 18, height 5' 6" (1 676 m), weight 96 7 kg (213 lb 3 2 oz), SpO2 97 %  ,Body mass index is 34 41 kg/m²  Constitutional: Patient appears well-developed  HEENT: PERR, EOMI, MMM  Cardiovascular: Normal rate and regular rhythm  Pulmonary/Chest: Effort normal and breath sounds normal    Abdomen: Soft, + BS, NT  Assessment/Plan:  Adry Galvan is a(n) 42y o  year old male with bipolar disorder      1  Cardiac with newly diagnosed hypertension, tachycardia  Patient started on HCTZ 12 5 mg daily on 4/21/2022  Patient's BP this morning 155/76 mmHg  His HR has been elevated above 100 bpm   Patient started on metoprolol tartrate 12 5 mg twice daily on 4/22/2022    2  Alcohol abuse  Patient has been put on thiamine, folic acid and multivitamin  3  Arthritis/headache  Patient may get Tylenol on as needed basis  4  Insomnia  Patient may get trazodone as needed  5  Vitamin-D deficiency  Patient started on vitamin-D supplements  6  Vitamin B12 deficiency  Patient started on vitamin B12 supplements  The patient was discussed with Dr Caleb Alexander and he is in agreement with the above note

## 2022-04-23 NOTE — NURSING NOTE
Pt visible on unit throughout entire shift  Needed frequent redirection by RN but did respond well to set limits and once verbally redirected demonstrated expected behaviors during discussion with RN  Speech rapid pressured  Loud   Tangential

## 2022-04-23 NOTE — NURSING NOTE
Basil Goodson was in the milieu most of the evening and continued to be grandiose in behavior  He was less intrusive with the patients but continued to dominate use of the phones and seek out attention from the staff  He was overheard having a heated exchange with a caller and later in the evening after peers had retired he had to be directed because of inappropriate behavior  When offered listening time Basil Goodson started crying and stated he was worried about his mother  He requested to use the phone to call  He then began to relate events that were loosely associated, blaming his family for all his 1s and mishandling by police when being restrained  He was up a number of times throughout the night pacing the halls  Offered medication for anxiety which was refused

## 2022-04-24 PROCEDURE — 99232 SBSQ HOSP IP/OBS MODERATE 35: CPT | Performed by: NURSE PRACTITIONER

## 2022-04-24 RX ADMIN — CLONAZEPAM 0.25 MG: 0.5 TABLET ORAL at 17:43

## 2022-04-24 RX ADMIN — Medication 1 TABLET: at 09:11

## 2022-04-24 RX ADMIN — CLONAZEPAM 0.25 MG: 0.5 TABLET ORAL at 09:06

## 2022-04-24 RX ADMIN — PALIPERIDONE PALMITATE 156 MG: 156 INJECTION INTRAMUSCULAR at 14:00

## 2022-04-24 RX ADMIN — Medication 1000 UNITS: at 09:11

## 2022-04-24 RX ADMIN — CYANOCOBALAMIN TAB 500 MCG 500 MCG: 500 TAB at 09:14

## 2022-04-24 RX ADMIN — THIAMINE HCL TAB 100 MG 100 MG: 100 TAB at 09:11

## 2022-04-24 RX ADMIN — METOPROLOL TARTRATE 12.5 MG: 25 TABLET ORAL at 09:11

## 2022-04-24 RX ADMIN — METOPROLOL TARTRATE 12.5 MG: 25 TABLET ORAL at 20:52

## 2022-04-24 RX ADMIN — FOLIC ACID 1 MG: 1 TABLET ORAL at 09:11

## 2022-04-24 RX ADMIN — HYDROCHLOROTHIAZIDE 12.5 MG: 12.5 TABLET ORAL at 09:11

## 2022-04-24 RX ADMIN — NICOTINE POLACRILEX 2 MG: 2 GUM, CHEWING BUCCAL at 08:59

## 2022-04-24 NOTE — PLAN OF CARE
Problem: Alteration in Thoughts and Perception  Goal: Treatment Goal: Gain control of psychotic behaviors/thinking, reduce/eliminate presenting symptoms and demonstrate improved reality functioning upon discharge  Outcome: Progressing  Goal: Verbalize thoughts and feelings  Description: Interventions:  - Promote a nonjudgmental and trusting relationship with the patient through active listening and therapeutic communication  - Assess patient's level of functioning, behavior and potential for risk  - Engage patient in 1 on 1 interactions  - Encourage patient to express fears, feelings, frustrations, and discuss symptoms    - [place] patient to reality, help patient recognize reality-based thinking   - Administer medications as ordered and assess for potential side effects  - Provide the patient education related to the signs and symptoms of the illness and desired effects of prescribed medications  Interventions:  - Assess and re-assess patient's level of risk   - Engage patient in 1:1 interactions, daily, for a minimum of 15 minutes   - Encourage patient to express feelings, fears, frustrations, hopes   Outcome: Progressing  Goal: Refrain from acting on delusional thinking/internal stimuli  Description: Interventions:  - Monitor patient closely, per order   - Utilize least restrictive measures   - Set reasonable limits, give positive feedback for acceptable   - Administer medications as ordered and monitor of potential side effects  Outcome: Progressing  Goal: Agree to be compliant with medication regime, as prescribed and report medication side effects  Description: Interventions:  - Offer appropriate PRN medication and supervise ingestion; conduct AIMS, as needed   Outcome: Progressing  Goal: Attend and participate in unit activities, including therapeutic, recreational, and educational groups  Description: Interventions:  -Encourage Visitation and family involvement in care  Interventions:  - Provide therapeutic and educational activities daily, encourage attendance and participation, and document same in the medical record   Outcome: Progressing  Goal: Recognize dysfunctional thoughts, communicate reality-based thoughts at the time of discharge  Description: Interventions:  - Provide medication and psycho-education to assist patient in compliance and developing insight into his/her illness   Outcome: Progressing  Goal: Complete daily ADLs, including personal hygiene independently, as able  Description: Interventions:  - Observe, teach, and assist patient with ADLS  - Monitor and promote a balance of rest/activity, with adequate nutrition and elimination   Interventions:  - Observe, teach, and assist patient with ADLS  -  Monitor and promote a balance of rest/activity, with adequate nutrition and elimination   Outcome: Progressing     Problem: Alteration in Orientation  Goal: Treatment Goal: Demonstrate a reduction of confusion and improved orientation to person, place, time and/or situation upon discharge, according to optimum baseline/ability  Outcome: Progressing  Goal: Interact with staff daily  Description: Interventions:  - Assess and re-assess patient's level of orientation  - Engage patient in 1 on 1 interactions, daily, for a minimum of 15 minutes   - Establish rapport/trust with patient   Outcome: Progressing  Goal: Express concerns related to confused thinking related to:  Description: Interventions:  - Encourage patient to express feelings, fears, frustrations, hopes  - Assign consistent caregivers   - [place]/re-orient patient as needed  - Allow comfort items, as appropriate  - Provide visual cues, signs, etc    Outcome: Progressing  Goal: Allow medical examinations, as recommended  Description: Interventions:  - Provide physical/neurological exams and/or referrals, per provider   Outcome: Progressing  Goal: Cooperate with recommended testing/procedures  Description: Interventions:  - Determine need for ancillary testing  - Observe for mental status changes  - Implement falls/precaution protocol   Outcome: Progressing  Goal: Attend and participate in unit activities, including therapeutic, recreational, and educational groups  Description: Interventions:  - Provide therapeutic and educational activities daily, encourage attendance and participation, and document same in the medical record   - Provide appropriate opportunities for reminiscence   - Provide a consistent daily routine   - Encourage family contact/visitation   Outcome: Progressing  Goal: Complete daily ADLs, including personal hygiene independently, as able  Description: Interventions:  - Observe, teach, and assist patient with ADLS  Outcome: Progressing

## 2022-04-24 NOTE — NURSING NOTE
Pt appears to be sleeping with no symptoms of respiratory distress, will continue to monitor with Q7 checks

## 2022-04-24 NOTE — PROGRESS NOTES
Progress Note - Mac Rivas 42 y o  male MRN: [id number]    Unit/Bed#: [facility] 220-01 Encounter: [id number]        Subjective:   Patient seen and examined at bedside after reviewing the chart and discussing the case with the caring staff  Patient examined at bedside  Patient has no acute complaints  His BP continues to be elevated along with HR elevated over 100 bpm     Physical Exam   Vitals: Blood pressure 145/79, pulse 101, temperature 98 6 °F (37 °C), temperature source Temporal, resp  rate 18, height 5' 6" (1 676 m), weight 96 7 kg (213 lb 3 2 oz), SpO2 97 %  ,Body mass index is 34 41 kg/m²  Constitutional: Patient appears well-developed  HEENT: PERR, EOMI, MMM  Cardiovascular: Normal rate and regular rhythm  Pulmonary/Chest: Effort normal and breath sounds normal    Abdomen: Soft, + BS, NT  Assessment/Plan:  Mac Rivas is a(n) 42y o  year old male with bipolar disorder      1  Cardiac with newly diagnosed hypertension, tachycardia  Patient started on HCTZ 12 5 mg daily on 4/21/2022  His HR has been elevated above 100 bpm   Patient started on metoprolol tartrate 12 5 mg twice daily on 4/22/2022    2  Alcohol abuse  Patient has been put on thiamine, folic acid and multivitamin  3  Arthritis/headache  Patient may get Tylenol on as needed basis  4  Insomnia  Patient may get trazodone as needed  5  Vitamin-D deficiency  Patient started on vitamin-D supplements  6  Vitamin B12 deficiency  Patient started on vitamin B12 supplements  The patient was discussed with Dr Alvino Moeller and he is in agreement with the above note

## 2022-04-24 NOTE — PROGRESS NOTES
Progress Note - [place] [facility]  [facility] MRN: [id number]  Unit/Bed#: [facility] 220-01 Encounter: [id number]    Assessment/Plan   Principal Problem:    Schizoaffective disorder, bipolar type with good prognostic features ([address] )      Behavior over the last 24 hours:  unchanged  Sleep: normal  Appetite: normal  Medication side effects: No  ROS: no complaints and all other systems are negative    Basil Goodson was seen today for psychiatric follow-up  Remains bizarre  He is often found in bathroom attempting to fix things  This morning patient was observed cleaning his identification bracelet  Intrusive at times, but responds well to verbal redirection and limit setting  Denies anxiety/depression/AVH/SI/HI  Mood controlled  Compliant with medications and meals and is sleeping undisturbed throughout the night  Basil Goodson is aware he will be receiving his 2nd dose of [place] today; denies any side effects from injection  Mental Status Evaluation:  Appearance:  age appropriate, casually dressed and bald   Behavior:  Bizarre, intrusive, redirectable, cooperative   Speech:  normal pitch and normal volume   Mood:  euthymic   Affect:  constricted   Thought Process:  circumstantial and illogical   Thought Content:  Less paranoid   Perceptual Disturbances: Denies AVH, does not appear internally preoccupied   Risk Potential: Suicidal Ideations none  Homicidal Ideations none  Potential for Aggression No   Sensorium:  person, place and time/date   Memory:  recent and remote memory grossly intact   Consciousness:  alert and awake    Attention: attention span appeared shorter than expected for age   Insight:  limited   Judgment: limited   Gait/Station: normal gait/station and normal balance   Motor Activity: no abnormal movements     Progress Toward Goals:  Some improvement  Less paranoid and grandiose  Remains bizarre  Responds well to verbal redirection  Last dose of PO Invega given last evening    Will receive 2nd dose of Invega Sustenna 156 mg IM today, 04/24/2022  Depakote level to be drawn 04/25/2022  No discharge date at this time  Recommended Treatment: Continue with group therapy, milieu therapy and occupational therapy  Risks, benefits and possible side effects of Medications:   Marylene Rives has limited understanding of risks versus benefits of medications, but agrees to take as prescribed      Medications:   all current active meds have been reviewed, continue current psychiatric medications and current meds:   Current Facility-Administered Medications   Medication Dose Route Frequency    acetaminophen (TYLENOL) tablet 650 mg  650 mg Oral Q6H PRN    acetaminophen (TYLENOL) tablet 650 mg  650 mg Oral Q4H PRN    acetaminophen (TYLENOL) tablet 975 mg  975 mg Oral Q6H PRN    aluminum-magnesium hydroxide-simethicone (MYLANTA) oral suspension 30 mL  30 mL Oral Q4H PRN    haloperidol lactate (HALDOL) injection 2 5 mg  2 5 mg Intramuscular Q6H PRN Max 4/day    And    LORazepam (ATIVAN) injection 1 mg  1 mg Intramuscular Q6H PRN Max 4/day    And    benztropine (COGENTIN) injection 0 5 mg  0 5 mg Intramuscular Q6H PRN Max 4/day    haloperidol lactate (HALDOL) injection 5 mg  5 mg Intramuscular Q4H PRN Max 4/day    And    LORazepam (ATIVAN) injection 2 mg  2 mg Intramuscular Q4H PRN Max 4/day    And    benztropine (COGENTIN) injection 1 mg  1 mg Intramuscular Q4H PRN Max 4/day    benztropine (COGENTIN) tablet 1 mg  1 mg Oral Q6H PRN    cholecalciferol (VITAMIN D3) tablet 1,000 Units  1,000 Units Oral Daily    clonazePAM (KlonoPIN) tablet 0 25 mg  0 25 mg Oral BID    cyanocobalamin (VITAMIN B-12) tablet 500 mcg  500 mcg Oral Daily    hydrOXYzine HCL (ATARAX) tablet 50 mg  50 mg Oral Q6H PRN Max 4/day    Or    diphenhydrAMINE (BENADRYL) injection 50 mg  50 mg Intramuscular Q6H PRN    divalproex sodium (DEPAKOTE ER) 24 hr tablet 1,500 mg  1,500 mg Oral HS    folic acid (FOLVITE) tablet 1 mg  1 mg Oral Daily    haloperidol (HALDOL) tablet 2 mg  2 mg Oral Q4H PRN Max 6/day    haloperidol (HALDOL) tablet 5 mg  5 mg Oral Q6H PRN Max 4/day    haloperidol (HALDOL) tablet 5 mg  5 mg Oral Q4H PRN Max 4/day    hydrochlorothiazide (HYDRODIURIL) tablet 12 5 mg  12 5 mg Oral Daily    hydrOXYzine HCL (ATARAX) tablet 100 mg  100 mg Oral Q6H PRN Max 4/day    Or    LORazepam (ATIVAN) injection 2 mg  2 mg Intramuscular Q6H PRN    hydrOXYzine HCL (ATARAX) tablet 25 mg  25 mg Oral Q6H PRN Max 4/day    metoprolol tartrate (LOPRESSOR) tablet 12 5 mg  12 5 mg Oral Q12H [address]    multivitamin-minerals (CENTRUM) tablet 1 tablet  1 tablet Oral Daily    nicotine (NICODERM CQ) 21 mg/24 hr TD 24 hr patch 1 patch  1 patch Transdermal Daily    nicotine polacrilex (NICORETTE) gum 2 mg  2 mg Oral Q2H PRN    paliperidone palmitate ER (INVEGA SUSTENNA) IM injection 156 mg  156 mg Intramuscular Once    polyethylene glycol (MIRALAX) packet 17 g  17 g Oral Daily PRN    thiamine tablet 100 mg  100 mg Oral Daily    traZODone (DESYREL) tablet 100 mg  100 mg Oral HS PRN     Labs: I have personally reviewed all pertinent laboratory/tests results  CMP:   Lab Results   Component Value Date    SODIUM 136 04/13/2022    K 3 7 04/13/2022     04/13/2022    CO2 25 04/13/2022    AGAP 6 04/13/2022    BUN 13 04/13/2022    CREATININE 0 71 04/13/2022    GLUC 131 04/13/2022    GLUF 82 10/09/2018    CALCIUM 9 1 04/13/2022    AST 63 (H) 04/09/2022    ALT 62 04/09/2022    ALKPHOS 84 04/09/2022    TP 7 0 04/09/2022    ALB 3 8 04/09/2022    TBILI 0 46 04/09/2022    EGFR 114 04/13/2022     EKG   Lab Results   Component Value Date    VENTRATE 99 04/21/2022    ATRIALRATE 99 04/21/2022    PRINT 138 04/21/2022    QRSDINT 80 04/21/2022    QTINT 336 04/21/2022    QTCINT 431 04/21/2022    PAXIS 55 04/21/2022    QRSAXIS 43 04/21/2022    TWAVEAXIS 39 04/21/2022     Counseling / Coordination of Care  Total floor / unit time spent today 25 minutes   Greater than 50% of total time was spent with the patient and / or family counseling and / or coordination of care   A description of the counseling / coordination of care:  Medication education, treatment plan, supportive therapy

## 2022-04-24 NOTE — NURSING NOTE
Administered 156 mg [place] in right deltoid  Pt tolerated without complication  Medication education provided  Pt verbalized understanding

## 2022-04-25 LAB — VALPROATE SERPL-MCNC: <10 UG/ML (ref 50–100)

## 2022-04-25 PROCEDURE — 99232 SBSQ HOSP IP/OBS MODERATE 35: CPT | Performed by: NURSE PRACTITIONER

## 2022-04-25 PROCEDURE — 80164 ASSAY DIPROPYLACETIC ACD TOT: CPT | Performed by: NURSE PRACTITIONER

## 2022-04-25 RX ORDER — DIVALPROEX SODIUM 500 MG/1
1500 TABLET, EXTENDED RELEASE ORAL DAILY
Status: DISCONTINUED | OUTPATIENT
Start: 2022-04-25 | End: 2022-04-28 | Stop reason: HOSPADM

## 2022-04-25 RX ADMIN — Medication 1 TABLET: at 08:44

## 2022-04-25 RX ADMIN — FOLIC ACID 1 MG: 1 TABLET ORAL at 08:46

## 2022-04-25 RX ADMIN — Medication 1000 UNITS: at 08:46

## 2022-04-25 RX ADMIN — CYANOCOBALAMIN TAB 500 MCG 500 MCG: 500 TAB at 08:44

## 2022-04-25 RX ADMIN — METOPROLOL TARTRATE 12.5 MG: 25 TABLET ORAL at 08:45

## 2022-04-25 RX ADMIN — CLONAZEPAM 0.25 MG: 0.5 TABLET ORAL at 08:42

## 2022-04-25 RX ADMIN — THIAMINE HCL TAB 100 MG 100 MG: 100 TAB at 08:45

## 2022-04-25 RX ADMIN — HYDROCHLOROTHIAZIDE 12.5 MG: 12.5 TABLET ORAL at 08:44

## 2022-04-25 RX ADMIN — METOPROLOL TARTRATE 12.5 MG: 25 TABLET ORAL at 21:30

## 2022-04-25 RX ADMIN — CLONAZEPAM 0.25 MG: 0.5 TABLET ORAL at 18:42

## 2022-04-25 RX ADMIN — DIVALPROEX SODIUM 1500 MG: 500 TABLET, EXTENDED RELEASE ORAL at 14:04

## 2022-04-25 RX ADMIN — NICOTINE 1 PATCH: 21 PATCH, EXTENDED RELEASE TRANSDERMAL at 08:41

## 2022-04-25 NOTE — PROGRESS NOTES
Progress Note - [place] Myron 5 42 y o  male MRN: [id number]  Unit/Bed#: [facility] 220-01 Encounter: [id number]    Assessment/Plan   Principal Problem:    Schizoaffective disorder, bipolar type with good prognostic features ([address] )      Behavior over the last 24 hours:  unchanged  Sleep: normal  Appetite: normal  Medication side effects: No  ROS: no complaints and all other systems are negative    Vern Centeno was seen today for psychiatric follow-up  Patient states he has been refusing his Depakote because he was, overdosed on it in 2007    Medication education was provided and patient agreeable to continue taking, but requested it be given during the daytime hours  He declines any difficulty with sleep  Mood elevated and somewhat grandiose  Thoughts disorganized and paranoid  Denies AVH/SI/HI  Patient then became increasingly angry and asked this provider, Jonn Cifuentes do I need to sleep more?"   Otherwise, compliant with meals and denies any anxiety or depression  Verbalized anger that he is not being discharged today and feels that he is, Refmyriam Retanaier lied to   Often visible in the milieu and keeps to self       Mental Status Evaluation:  Appearance:  age appropriate and casually dressed   Behavior:  suspicious, intrusive, bizarre   Speech:  loud   Mood:  angry and irritable   Affect:  mood-congruent   Thought Process:  disorganized   Thought Content:  paranoid   Perceptual Disturbances: Denies AVH, does not appar internallly preoccupied   Risk Potential: Suicidal Ideations none  Homicidal Ideations none  Potential for Aggression Yes due to mood lability    Sensorium:  person, place and time/date   Memory:  recent and remote memory: unable to assess due to lack of cooperation   Consciousness:  alert and awake    Attention: attention span appeared shorter than expected for age   Insight:  limited   Judgment: limited   Gait/Station: normal gait/station and normal balance   Motor Activity: no abnormal movements Progress Toward Goals:  No improvement  Remains bizarre, paranoid, irritable, and elevated  Will change dosing of Depakote to 0900 and repeat level in 3 days  Depakote level today < 10  Continue remainder psychotropic regimen  Received 2nd injection of [place] yesterday, denies any side effects and tolerating well  No discharge date at this time  Recommended Treatment: Continue with group therapy, milieu therapy and occupational therapy  Risks, benefits and possible side effects of Medications:   Read Profit has limited understanding of risks versus benefits of medications, but agrees to take as prescribed      Medications:   all current active meds have been reviewed, continue current psychiatric medications and current meds:   Current Facility-Administered Medications   Medication Dose Route Frequency    acetaminophen (TYLENOL) tablet 650 mg  650 mg Oral Q6H PRN    acetaminophen (TYLENOL) tablet 650 mg  650 mg Oral Q4H PRN    acetaminophen (TYLENOL) tablet 975 mg  975 mg Oral Q6H PRN    aluminum-magnesium hydroxide-simethicone (MYLANTA) oral suspension 30 mL  30 mL Oral Q4H PRN    haloperidol lactate (HALDOL) injection 2 5 mg  2 5 mg Intramuscular Q6H PRN Max 4/day    And    LORazepam (ATIVAN) injection 1 mg  1 mg Intramuscular Q6H PRN Max 4/day    And    benztropine (COGENTIN) injection 0 5 mg  0 5 mg Intramuscular Q6H PRN Max 4/day    haloperidol lactate (HALDOL) injection 5 mg  5 mg Intramuscular Q4H PRN Max 4/day    And    LORazepam (ATIVAN) injection 2 mg  2 mg Intramuscular Q4H PRN Max 4/day    And    benztropine (COGENTIN) injection 1 mg  1 mg Intramuscular Q4H PRN Max 4/day    benztropine (COGENTIN) tablet 1 mg  1 mg Oral Q6H PRN    cholecalciferol (VITAMIN D3) tablet 1,000 Units  1,000 Units Oral Daily    clonazePAM (KlonoPIN) tablet 0 25 mg  0 25 mg Oral BID    cyanocobalamin (VITAMIN B-12) tablet 500 mcg  500 mcg Oral Daily    hydrOXYzine HCL (ATARAX) tablet 50 mg  50 mg Oral Q6H PRN Max 4/day    Or    diphenhydrAMINE (BENADRYL) injection 50 mg  50 mg Intramuscular Q6H PRN    divalproex sodium (DEPAKOTE ER) 24 hr tablet 1,500 mg  1,500 mg Oral Daily    folic acid (FOLVITE) tablet 1 mg  1 mg Oral Daily    haloperidol (HALDOL) tablet 2 mg  2 mg Oral Q4H PRN Max 6/day    haloperidol (HALDOL) tablet 5 mg  5 mg Oral Q6H PRN Max 4/day    haloperidol (HALDOL) tablet 5 mg  5 mg Oral Q4H PRN Max 4/day    hydrochlorothiazide (HYDRODIURIL) tablet 12 5 mg  12 5 mg Oral Daily    hydrOXYzine HCL (ATARAX) tablet 100 mg  100 mg Oral Q6H PRN Max 4/day    Or    LORazepam (ATIVAN) injection 2 mg  2 mg Intramuscular Q6H PRN    hydrOXYzine HCL (ATARAX) tablet 25 mg  25 mg Oral Q6H PRN Max 4/day    metoprolol tartrate (LOPRESSOR) tablet 12 5 mg  12 5 mg Oral Q12H [address]    multivitamin-minerals (CENTRUM) tablet 1 tablet  1 tablet Oral Daily    nicotine (NICODERM CQ) 21 mg/24 hr TD 24 hr patch 1 patch  1 patch Transdermal Daily    nicotine polacrilex (NICORETTE) gum 2 mg  2 mg Oral Q2H PRN    polyethylene glycol (MIRALAX) packet 17 g  17 g Oral Daily PRN    thiamine tablet 100 mg  100 mg Oral Daily    traZODone (DESYREL) tablet 100 mg  100 mg Oral HS PRN     Labs: I have personally reviewed all pertinent laboratory/tests results  CMP:   Lab Results   Component Value Date    SODIUM 136 04/13/2022    K 3 7 04/13/2022     04/13/2022    CO2 25 04/13/2022    AGAP 6 04/13/2022    BUN 13 04/13/2022    CREATININE 0 71 04/13/2022    GLUC 131 04/13/2022    GLUF 82 10/09/2018    CALCIUM 9 1 04/13/2022    AST 63 (H) 04/09/2022    ALT 62 04/09/2022    ALKPHOS 84 04/09/2022    TP 7 0 04/09/2022    ALB 3 8 04/09/2022    TBILI 0 46 04/09/2022    EGFR 114 04/13/2022     Depakote:   Lab Results   Component Value Date    VALPROICTOT <10 (L) 04/25/2022     Counseling / Coordination of Care  Total floor / unit time spent today 25 minutes   Greater than 50% of total time was spent with the patient and / or family counseling and / or coordination of care   A description of the counseling / coordination of care:  Medication education, treatment plan

## 2022-04-25 NOTE — NURSING NOTE
Pt visible on the unit  Needs frequent redirection and limit setting  Speech is rapid and pressured  Pt denies SI/ Hi and hallucinations  Pt is fixated on discharge  Denies anxiety and depression    Will continue to monitor

## 2022-04-25 NOTE — PROGRESS NOTES
04/25/22 0730   Activity/Group Checklist   Group   (Community Meeting Group and Processing)   Attendance Attended   Attendance Duration (min) 46-60   Interactions Disorganized interaction   Affect/Mood Wide   Goals Achieved Identified feelings; Discussed coping strategies; Able to listen to others; Able to engage in interactions

## 2022-04-25 NOTE — NURSING NOTE
Pt was observed at the unit in the dayroom and at the nurse's station  Pt was labile but was able to redirect  Pt refused evening depakote but was otherwise compliant with all medications and treatments, will continue to monitor with Q7 checks

## 2022-04-25 NOTE — PROGRESS NOTES
04/25/22 8754   Activity/Group Checklist   Group   ([employer] Art Therapy Processing)   Attendance Attended   Attendance Duration (min) Greater than 60   Interactions Disorganized interaction   Affect/Mood Wide   Goals Achieved Identified feelings; Identified triggers; Discussed coping strategies; Able to listen to others; Able to engage in interactions

## 2022-04-25 NOTE — PROGRESS NOTES
Progress Note - Juan Carlos Carreon 42 y o  male MRN: [id number]    Unit/Bed#: [facility] 220-01 Encounter: [id number]        Subjective:   Patient seen and examined at bedside after reviewing the chart and discussing the case with the caring staff  Patient examined at bedside  Patient has no acute complaints  His BP continues to be elevated along with HR elevated over 100 bpm     Physical Exam   Vitals: Blood pressure 160/88, pulse 102, temperature 98 °F (36 7 °C), temperature source Temporal, resp  rate 18, height 5' 6" (1 676 m), weight 96 7 kg (213 lb 3 2 oz), SpO2 96 %  ,Body mass index is 34 41 kg/m²  Constitutional: Patient appears well-developed  HEENT: PERR, EOMI, MMM  Cardiovascular: Normal rate and regular rhythm  Pulmonary/Chest: Effort normal and breath sounds normal    Abdomen: Soft, + BS, NT  Assessment/Plan:  Juan Carlos Carreon is a(n) 42y o  year old male with bipolar disorder      1  Cardiac with newly diagnosed hypertension, tachycardia  Patient started on HCTZ 12 5 mg daily on 4/21/2022  His HR has been elevated above 100 bpm   Patient started on metoprolol tartrate 12 5 mg twice daily on 4/22/2022    2  Alcohol abuse  Patient has been put on thiamine, folic acid and multivitamin  3  Arthritis/headache  Patient may get Tylenol on as needed basis  4  Insomnia  Patient may get trazodone as needed  5  Vitamin-D deficiency  Patient started on vitamin-D supplements  6  Vitamin B12 deficiency  Patient started on vitamin B12 supplements

## 2022-04-26 PROCEDURE — 99232 SBSQ HOSP IP/OBS MODERATE 35: CPT | Performed by: PSYCHIATRY & NEUROLOGY

## 2022-04-26 RX ORDER — HYDROCHLOROTHIAZIDE 25 MG/1
25 TABLET ORAL DAILY
Status: DISCONTINUED | OUTPATIENT
Start: 2022-04-27 | End: 2022-04-28 | Stop reason: HOSPADM

## 2022-04-26 RX ADMIN — NICOTINE POLACRILEX 2 MG: 2 GUM, CHEWING BUCCAL at 03:08

## 2022-04-26 RX ADMIN — HYDROCHLOROTHIAZIDE 12.5 MG: 12.5 TABLET ORAL at 08:11

## 2022-04-26 RX ADMIN — CYANOCOBALAMIN TAB 500 MCG 500 MCG: 500 TAB at 08:11

## 2022-04-26 RX ADMIN — THIAMINE HCL TAB 100 MG 100 MG: 100 TAB at 08:11

## 2022-04-26 RX ADMIN — Medication 1 TABLET: at 08:11

## 2022-04-26 RX ADMIN — Medication 1000 UNITS: at 08:11

## 2022-04-26 RX ADMIN — METOPROLOL TARTRATE 25 MG: 25 TABLET ORAL at 21:13

## 2022-04-26 RX ADMIN — DIVALPROEX SODIUM 1500 MG: 500 TABLET, EXTENDED RELEASE ORAL at 08:11

## 2022-04-26 RX ADMIN — CLONAZEPAM 0.25 MG: 0.5 TABLET ORAL at 08:16

## 2022-04-26 RX ADMIN — NICOTINE POLACRILEX 2 MG: 2 GUM, CHEWING BUCCAL at 05:49

## 2022-04-26 RX ADMIN — FOLIC ACID 1 MG: 1 TABLET ORAL at 08:11

## 2022-04-26 RX ADMIN — CLONAZEPAM 0.25 MG: 0.5 TABLET ORAL at 17:38

## 2022-04-26 RX ADMIN — METOPROLOL TARTRATE 12.5 MG: 25 TABLET ORAL at 08:12

## 2022-04-26 NOTE — PROGRESS NOTES
04/26/22 1000   Activity/Group Checklist   Group   (Community Building and Emotional Processing Art Therapy )   Attendance Attended   Attendance Duration (min) Greater than 60  (pt was in and out of group times 3)   Interactions Interacted appropriately   Affect/Mood Appropriate   Goals Achieved Identified feelings; Discussed coping strategies; Able to listen to others; Able to engage in interactions; Able to reflect/comment on own behavior;Able to manage/cope with feelings

## 2022-04-26 NOTE — NURSING NOTE
Patient compliant with meds and meals  Denies everything  Social and visible on unit  Attends groups  irritable at times  Q 7 min behavioral and safety checks in place

## 2022-04-26 NOTE — NURSING NOTE
Patient compliant with meds and meals  Denies everything became increasingly agitated and aggressive throughout the shift, intrusive with staff will cont to ask for stuff even when told no  Will staff split when doesn't get what he wants  Believes he is getting discharged even when told he is not had his mom come to pick him up yesterday she left after being told he is not leaving  Q 7 min behavioral and safety checks in place

## 2022-04-26 NOTE — NURSING NOTE
Pt present in his room most of the shift  Pt did come out occasionally for snacks and to request medication  Pt assessed in his room sleeping  Pt stated he was not angry or depressed  No talk about discharge and speech was slow and calm  Pt answered a phone call and went back to bed  Pt had no behaviors throughout the shift  Continuous visual safety checks performed throughout the shift  Safety precautions maintained  Will continue to monitor

## 2022-04-26 NOTE — PROGRESS NOTES
Progress Note - Filemon Mcmillan 42 y o  male MRN: [id number]    Unit/Bed#: [facility] 220-01 Encounter: [id number]        Subjective:   Patient seen and examined at bedside after reviewing the chart and discussing the case with the caring staff  Patient examined at bedside  Patient has no acute complaints  His BP continues to be elevated along with HR elevated over 100 bpm     Patient is a possible discharge for Thursday 04/28/2022  Physical Exam   Vitals: Blood pressure 140/92, pulse 105, temperature 98 °F (36 7 °C), temperature source Temporal, resp  rate 18, height 5' 6" (1 676 m), weight 96 7 kg (213 lb 3 2 oz), SpO2 97 %  ,Body mass index is 34 41 kg/m²  Constitutional: Patient appears well-developed  HEENT: PERR, EOMI, MMM  Cardiovascular: Normal rate and regular rhythm  Pulmonary/Chest: Effort normal and breath sounds normal    Abdomen: Soft, + BS, NT  Assessment/Plan:  Filemon Mcmillan is a(n) 42y o  year old male with bipolar disorder      1  Cardiac with newly diagnosed hypertension, tachycardia  Patient started on HCTZ 25 mg daily along with Metoprolol tartrate 25 mg twice daily  2  Alcohol abuse  Patient has been put on thiamine, folic acid and multivitamin  3  Arthritis/headache  Patient may get Tylenol on as needed basis  4  Insomnia  Patient may get trazodone as needed  5  Vitamin-D deficiency  Patient started on vitamin-D supplements  6  Vitamin B12 deficiency  Patient started on vitamin B12 supplements

## 2022-04-26 NOTE — PROGRESS NOTES
04/25/22 0830   Team Meeting   Meeting Type Daily Rounds   Team Members Present   Team Members Present Physician;Nurse;   Physician Team Member Dr Rubin Bennett MD; Amena Boyce, [facility]   Nursing Team Member Martin Hanson, [address] [place] [address]   Care Management Team Member Peggy Anthony, MS, [facility], [facility]   Patient/Family Present   Patient Present No   Patient's Family Present No   Intrusive, needs limit setting, poor boundaries, behavioral, bizarre, blocked toilet, irritable edge, 2nd invega given yesterday, medication review, Depakote level reviewed, manic

## 2022-04-26 NOTE — PROGRESS NOTES
04/26/22 0830   Team Meeting   Meeting Type Daily Rounds   Team Members Present   Team Members Present Physician;Nurse;   Physician Team Member Dr Chung Medina MD; Royce Ray, [address] [place] [address]   Nursing Team Member Margot Morales, [facility] Management Team Member Cindy Hannah MS, [facility]   Patient/Family Present   Patient Present No   Patient's Family Present No   Increased agitation, difficulty with peers, did well overnight, medication compliant, paranoid, suspicious, level Thursday due for depakote, spiritual counseling completed

## 2022-04-26 NOTE — SOCIAL WORK
CM met with PT for PT check, PT has irritable edge, inquiring about D/C, CM reviewed no D/C date at this time and reviewed plan of care  Reassurance provided  PT denies SI/HI/AH/VH anxiety and depression

## 2022-04-26 NOTE — PROGRESS NOTES
04/26/22 0798   Activity/Group Checklist   Group   (Community Meeting Group and Processing)   Attendance Attended   Attendance Duration (min) 46-60   Interactions Interacted appropriately   Affect/Mood Appropriate   Goals Achieved Identified feelings; Able to listen to others; Able to engage in interactions

## 2022-04-27 PROCEDURE — 99232 SBSQ HOSP IP/OBS MODERATE 35: CPT | Performed by: HOSPITALIST

## 2022-04-27 RX ORDER — DIVALPROEX SODIUM 500 MG/1
1500 TABLET, EXTENDED RELEASE ORAL DAILY
Qty: 90 TABLET | Refills: 0 | Status: SHIPPED | OUTPATIENT
Start: 2022-04-28 | End: 2022-05-28

## 2022-04-27 RX ADMIN — METOPROLOL TARTRATE 25 MG: 25 TABLET ORAL at 08:14

## 2022-04-27 RX ADMIN — NICOTINE POLACRILEX 2 MG: 2 GUM, CHEWING BUCCAL at 15:02

## 2022-04-27 RX ADMIN — CLONAZEPAM 0.25 MG: 0.5 TABLET ORAL at 08:13

## 2022-04-27 RX ADMIN — METOPROLOL TARTRATE 25 MG: 25 TABLET ORAL at 20:39

## 2022-04-27 RX ADMIN — NICOTINE 1 PATCH: 21 PATCH, EXTENDED RELEASE TRANSDERMAL at 08:16

## 2022-04-27 RX ADMIN — FOLIC ACID 1 MG: 1 TABLET ORAL at 08:14

## 2022-04-27 RX ADMIN — Medication 1 TABLET: at 08:15

## 2022-04-27 RX ADMIN — THIAMINE HCL TAB 100 MG 100 MG: 100 TAB at 08:14

## 2022-04-27 RX ADMIN — NICOTINE POLACRILEX 2 MG: 2 GUM, CHEWING BUCCAL at 11:04

## 2022-04-27 RX ADMIN — Medication 1000 UNITS: at 08:15

## 2022-04-27 RX ADMIN — DIVALPROEX SODIUM 1500 MG: 500 TABLET, EXTENDED RELEASE ORAL at 08:13

## 2022-04-27 RX ADMIN — HYDROCHLOROTHIAZIDE 25 MG: 25 TABLET ORAL at 08:15

## 2022-04-27 RX ADMIN — NICOTINE POLACRILEX 2 MG: 2 GUM, CHEWING BUCCAL at 18:17

## 2022-04-27 RX ADMIN — CYANOCOBALAMIN TAB 500 MCG 500 MCG: 500 TAB at 08:15

## 2022-04-27 NOTE — PLAN OF CARE
Problem: DISCHARGE PLANNING - CARE MANAGEMENT  Goal: Discharge to post-acute care or home with appropriate resources  Description: INTERVENTIONS:  - Conduct assessment to determine patient/family and health care team treatment goals, and need for post-acute services based on payer coverage, community resources, and patient preferences, and barriers to discharge  - Address psychosocial, clinical, and financial barriers to discharge as identified in assessment in conjunction with the patient/family and health care team  - Arrange appropriate level of post-acute services according to patients   needs and preference and payer coverage in collaboration with the physician and health care team  - Communicate with and update the patient/family, physician, and health care team regarding progress on the discharge plan  - Arrange appropriate transportation to post-acute venues  Outcome: Completed   PT will d/c home to mother tomorrow 4/28, will follow up with [facility] for psych and therapy

## 2022-04-27 NOTE — NURSING NOTE
Mica Choi is pleasant calm and cooperative  He is visible on the unit and social with peers  Attends group, denies SI/HI and hallucinations  Pt is compliant with meals and meds  Q 7 minutes checks maintained   Will continue to monitor

## 2022-04-27 NOTE — NURSING NOTE
Basil Goodson was much less aggressive and agitated this shift  His behavior was subdued and he was respectful, compliant with medications, socialized with others and in general had a good evening  Conversation was appropriate and vocal tone normal   Denied ideations, hallucinations, depression and anxiety  No PRNs utilized  No behaviors to report  He woke twice in the night to request beverages

## 2022-04-27 NOTE — PROGRESS NOTES
04/27/22 0830   Team Meeting   Meeting Type Daily Rounds   Team Members Present   Team Members Present Physician;Nurse;   Physician Team Member Dr Shaun Dubin, MD; Foy Spatz, CRNP   Nursing Team Member Jessica Beltran [facility] Management Team Member Oswaldo Arndt [facility], [facility], [facility]   Patient/Family Present   Patient Present No   Patient's Family Present No   D/C thursday, more controlled, taking Depakote, level Thursday am, tangential, sleeping

## 2022-04-27 NOTE — NURSING NOTE
Presents with bright,calm affect  Denies SI,HI,AH,VH,anxiety and depression  Attends groups with active participation,visible on unit,citlali is basically a non issue ,much improved  Converses freely and appropriately with peers and staff  In addition to documented education we discussed the rationale and importance of taking medications and following DCI's:stateD understanding,looking forward to slated DC tomorrow

## 2022-04-27 NOTE — PROGRESS NOTES
04/27/22 0730   Activity/Group Checklist   Group   (Community Meeting Group and Processing)   Attendance Attended   Attendance Duration (min) 46-60   Interactions Interacted appropriately   Affect/Mood Appropriate;Calm   Goals Achieved Identified feelings; Identified triggers; Discussed coping strategies; Able to listen to others; Able to engage in interactions

## 2022-04-27 NOTE — PROGRESS NOTES
Progress Note - [address] 42 y o  male MRN: [id number]   Unit/Bed#: [facility] 220-02 Encounter: [id number]    Behavior over the last 24 hours: slowly improving  John Perry seen today, per staff report has been , less bizarre on the unit  John Perry reports no subjective complaints today  Patient is less tangential and less elevated  He is able to have a more her cohesive conversation with out significant loosening of associations  Patient appears to be improving  He denies any suicidal or homicidal ideations  There is no evidence of any psychosis  Sleep and appetite have improved  Denies any side effects to medications and has been compliant with medications       Mental Status Evaluation:    Appearance:  casually dressed, adequate grooming, looks stated age   Behavior:  cooperative   Speech:  normal rate, normal volume   Mood:  anxious   Affect:  blunted   Thought Process:  concrete   Associations: intact associations   Thought Content:  no overt delusions   Perceptual Disturbances: no auditory hallucinations, no visual hallucinations   Risk Potential: Suicidal ideation - None at present  Homicidal ideation - None at present   Sensorium:  oriented to person, place and time/date   Memory:  recent and remote memory grossly intact   Consciousness:  alert and awake   Attention: attention span and concentration are age appropriate   Insight:  limited   Judgment: limited   Gait/Station: normal gait/station   Motor Activity: no abnormal movements     Vital signs in last 24 hours:    Temp:  [98 1 °F (36 7 °C)-98 8 °F (37 1 °C)] 98 1 °F (36 7 °C)  HR:  [90-99] 90  Resp:  [18] 18  BP: (140-141)/(93-95) 140/93    Laboratory results: I have personally reviewed all pertinent laboratory/tests results  Assessment/Plan   Principal Problem:    Schizoaffective disorder, bipolar type with good prognostic features ([facility] )    Recommended Treatment:     Planned medication and treatment changes:   We will discontinue Klonopin today  Continue Depakote 1500 mg daily with level to be drawn tomorrow morning  Discharge planning for the a m    All current active medications have been reviewed  Encourage group therapy, milieu therapy and occupational therapy  Behavioral Health checks every 7 minutes  Current Facility-Administered Medications   Medication Dose Route Frequency Provider Last Rate    acetaminophen  650 mg Oral Q6H PRN Hollywood Gut Medei, CRNP      acetaminophen  650 mg Oral Q4H PRN Hollywood Gut Medei, CRNP      acetaminophen  975 mg Oral Q6H PRN Mary Gut Medei, CRNP      aluminum-magnesium hydroxide-simethicone  30 mL Oral Q4H PRN Hollywood Gut Medei, CRNP      haloperidol lactate  2 5 mg Intramuscular Q6H PRN Max 4/day Hollywood Gut Medei, CRNP      And    LORazepam  1 mg Intramuscular Q6H PRN Max 4/day Hollywood Gut Medei, CRNP      And    benztropine  0 5 mg Intramuscular Q6H PRN Max 4/day Mary Gut Medei, CRNP      haloperidol lactate  5 mg Intramuscular Q4H PRN Max 4/day Hollywood Gut Medei, CRNP      And    LORazepam  2 mg Intramuscular Q4H PRN Max 4/day Hollywood Gut Medei, CRNP      And    benztropine  1 mg Intramuscular Q4H PRN Max 4/day Hollywood Gut Medei, CRNP      benztropine  1 mg Oral Q6H PRN Hollywood Gut Medei, CRNP      cholecalciferol  1,000 Units Oral Daily Molina Milner MD      clonazePAM  0 25 mg Oral BID Arturo Barnett MD      cyanocobalamin  500 mcg Oral Daily Molina Milner MD      hydrOXYzine HCL  50 mg Oral Q6H PRN Max 4/day Hollywood Gut Medei, CRNP      Or    diphenhydrAMINE  50 mg Intramuscular Q6H PRN Hollywood Gut Medei, CRNP      divalproex sodium  1,500 mg Oral Daily [facility] Medei, CRNP      folic acid  1 mg Oral Daily Molina Milner MD      haloperidol  2 mg Oral Q4H PRN Max 6/day Hollywood Gut Medei, CRNP      haloperidol  5 mg Oral Q6H PRN Max 4/day Hollywood Gut Medei, CRNP      haloperidol  5 mg Oral Q4H PRN Max 4/day Hollywood Gut Medei, CRNP      hydrochlorothiazide  25 mg Oral Daily Molina Milner MD  hydrOXYzine HCL  100 mg Oral Q6H PRN Max 4/day Castro LeLISY Patterson      Or    LORazepam  2 mg Intramuscular Q6H PRN LISY Greene      hydrOXYzine HCL  25 mg Oral Q6H PRN Max 4/day Annitalo LeLISY Patterson      metoprolol tartrate  25 mg Oral Q12H [address] Meenakshi Jennings MD      multivitamin-minerals  1 tablet Oral Daily Meenakshi Jennings MD      nicotine  1 patch Transdermal Daily Meenakshi Jennings MD      nicotine polacrilex  2 mg Oral Q2H PRN Meenakshi Jennings MD      polyethylene glycol  17 g Oral Daily PRN LISY Greene      thiamine  100 mg Oral Daily Meenakshi Jennings MD      traZODone  100 mg Oral HS PRN Castro LeLISY Patterson         Risks / Benefits of Treatment:    Risks, benefits, and possible side effects of medications explained to patient and patient verbalizes understanding and agreement for treatment  Counseling / Coordination of Care: Total floor / unit time spent today 25 minutes  Greater than 50% of total time was spent with the patient and / or family counseling and / or coordination of care  A description of counseling / coordination of care:  Patient's progress discussed with staff in treatment team meeting  Medications, treatment progress and treatment plan reviewed with patient      Elsa Maradiaga MD 04/27/22

## 2022-04-27 NOTE — PLAN OF CARE
Problem: Ineffective Coping  Goal: Participates in unit activities  Description: Interventions:  - Provide therapeutic environment   - Provide required programming   - Redirect inappropriate behaviors   Outcome: Progressing      Group Goals:  Healthy Coping Strategies and Processing   Attendance: 2/2   Participation: Cooperative, calm   Mood/Affect: Appropriate, social   Behaviors/Redirection: n/a

## 2022-04-27 NOTE — PLAN OF CARE
Problem: Alteration in Thoughts and Perception  Goal: Treatment Goal: Gain control of psychotic behaviors/thinking, reduce/eliminate presenting symptoms and demonstrate improved reality functioning upon discharge  Outcome: Progressing  Goal: Verbalize thoughts and feelings  Description: Interventions:  - Promote a nonjudgmental and trusting relationship with the patient through active listening and therapeutic communication  - Assess patient's level of functioning, behavior and potential for risk  - Engage patient in 1 on 1 interactions  - Encourage patient to express fears, feelings, frustrations, and discuss symptoms    - [place] patient to reality, help patient recognize reality-based thinking   - Administer medications as ordered and assess for potential side effects  - Provide the patient education related to the signs and symptoms of the illness and desired effects of prescribed medications  Interventions:  - Assess and re-assess patient's level of risk   - Engage patient in 1:1 interactions, daily, for a minimum of 15 minutes   - Encourage patient to express feelings, fears, frustrations, hopes   Outcome: Progressing  Goal: Refrain from acting on delusional thinking/internal stimuli  Description: Interventions:  - Monitor patient closely, per order   - Utilize least restrictive measures   - Set reasonable limits, give positive feedback for acceptable   - Administer medications as ordered and monitor of potential side effects  Outcome: Progressing  Goal: Agree to be compliant with medication regime, as prescribed and report medication side effects  Description: Interventions:  - Offer appropriate PRN medication and supervise ingestion; conduct AIMS, as needed   Outcome: Progressing  Goal: Attend and participate in unit activities, including therapeutic, recreational, and educational groups  Description: Interventions:  -Encourage Visitation and family involvement in care  Interventions:  - Provide therapeutic and educational activities daily, encourage attendance and participation, and document same in the medical record   Outcome: Progressing  Goal: Recognize dysfunctional thoughts, communicate reality-based thoughts at the time of discharge  Description: Interventions:  - Provide medication and psycho-education to assist patient in compliance and developing insight into his/her illness   Outcome: Progressing  Goal: Complete daily ADLs, including personal hygiene independently, as able  Description: Interventions:  - Observe, teach, and assist patient with ADLS  - Monitor and promote a balance of rest/activity, with adequate nutrition and elimination   Interventions:  - Observe, teach, and assist patient with ADLS  -  Monitor and promote a balance of rest/activity, with adequate nutrition and elimination   Outcome: Progressing

## 2022-04-27 NOTE — PROGRESS NOTES
Progress Note - Rj Herring 42 y o  male MRN: [id number]    Unit/Bed#: [facility] 220-02 Encounter: [id number]        Subjective:   Patient seen and examined at bedside after reviewing the chart and discussing the case with the caring staff  Patient examined at bedside  Patient has no acute complaints  Patient is a possible discharge for Thursday 04/28/2022  Physical Exam   Vitals: Blood pressure 140/93, pulse 90, temperature 98 1 °F (36 7 °C), temperature source Temporal, resp  rate 18, height 5' 6" (1 676 m), weight 96 7 kg (213 lb 3 2 oz), SpO2 97 %  ,Body mass index is 34 41 kg/m²  Constitutional: Patient appears well-developed  HEENT: PERR, EOMI, MMM  Cardiovascular: Normal rate and regular rhythm  Pulmonary/Chest: Effort normal and breath sounds normal    Abdomen: Soft, + BS, NT  Assessment/Plan:  Rj Herring is a(n) 42y o  year old male with bipolar disorder      1  Cardiac with newly diagnosed hypertension, tachycardia  Patient started on HCTZ 25 mg daily along with Metoprolol tartrate 25 mg twice daily  2  Alcohol abuse  Patient has been put on thiamine, folic acid and multivitamin  3  Arthritis/headache  Patient may get Tylenol on as needed basis  4  Insomnia  Patient may get trazodone as needed  5  Vitamin-D deficiency  Patient started on vitamin-D supplements  6  Vitamin B12 deficiency  Patient started on vitamin B12 supplements  The patient was discussed with Dr Jet Nur and he is in agreement with the above note

## 2022-04-27 NOTE — SOCIAL WORK
PT and CM met, reviewed plan for D/C for tomorrow  PT feels he is ready for D/C denies SI/HI/AH/VH anxiety and depression  PT agreeable to call his mother and signed an DAMIAN  PT and CM placed call to PT mother and Reggie Jenkins , she reports she is happy for her son to return home and feels he is safe to return, she idnciated that she dropped the PFA  She is not able to transport but will await his arrival  CM reviewed with her follow up supports, PT mother in agreement  Confirmed address of [address] [place] [address]  Call ended mutually  PT agreeable to follow up services with [facility], CM will call to scheduled  PT reports he will be compliant with medications moving forward  PT indicated he does not have anyone available to pick him up tomorrow and limited on financial monies  CM to scheduled transport  PT declined PCP appt but agreeable to discharge summary being sent  Declined CM at this time  CM placed call to [address] [place] [address], [phone]  informed her of PT scheduled D/C tomorrow  Notified of injection due date, she will notify team  PT is scheduled for follow up on 5/2/22 at 1:00pm via phone with Dr Radha Gillespie MD and his therapist Catracho Vides therapist will call PT directly to schedule therapy session  Confirmation of fax number [phone]   They will provide script for PT and arrange injection at [facility]  Call ended mutaly  CM sent transport request via epic  Pending response

## 2022-04-27 NOTE — DISCHARGE INSTR - OTHER ORDERS
Next dose of Invega Sustenna 156mg IM due 5/23/22! You are being discharged to your mothers home located at [address] [place], [place] [address], Phone: [phone]  Triggers you have identified during your hospitalization that led to your admission distressed mood, regression in mental health  Coping skills you have identified during your hospitalization include walking, music, working out, music  If you are unable to deal with your distressed mood alone please contact your provider at [facility] at [phone] or your primary care provider Dr Perry Vasques, DO at [phone]  If that is not effective and you continue to have (ex: suicidal ideation, homicidal ideation, distressed mood, overwhelmed, in crisis) please contact (Crisis #) [phone]  [place]: [phone], dial 911 or go to the nearest emergency center  [address]  [place]: [phone]  [place] [facility]: [phone]   [place] [facility]: [phone]  *National Suicide Prevention Lifeline:  [address] [place] [address] , [address] on [address] [facility] ([facility] [employer]: 257.534.4689/Website: www antonio org  *Substance Abuse and [facility]) [employer], which is a confidential, free, 24-hour-a-day, 365-day-a-year, information service for individuals and family members facing mental health and/or substance use disorders  This service provides referrals to local treatment facilities, support groups, and community-based organizations  Callers can also order free publications and other information  Call 9-527.397.3113/Website: www [facility] gov  *[employer] 2-1-1: This is a toll free, confidential, 24-hour-a-day service which connects you to a community  in your area who can help you find services and resources that are available to you locally and provide critical services that can improve and save lives    Call: 211  /Website: https://caneloXL Hybridsgiancarlo gilbert/ You declined drug and alcohol treatment, should you wish to schedule please call [place] [facility]: [phone]  You declined a follow up appointment with your primary care provider, should you wish to schedule please call Dr Jesse Toth, DO at [phone]  Your discharge summary will be faxed for continuity of care  Janas Halsted, RN, our [facility]ita [facility] Miky, will be calling you after your discharge, on the phone number that you provided  She will be available as an additional support, if needed  If you wish to speak with her, you may contact Robert Godinez at [phone]

## 2022-04-27 NOTE — PROGRESS NOTES
Progress Note - [facility] [age] y o  male MRN: @MRN   Unit/Bed#: [facility] 351-28 Encounter: [id number]      Report from staff regarding this patient received and discussed, and records reviewed prior to seeing this patient  Behavior over the last 24 hours: improving  Patient is visible in the unit, not angry not agitated tolerated milieu well  Discharge oriented  Stated he started sleeping the whole night last night    Patient remains appropriate, cooperative with session and cooperative with staff today  Sleep: improved  Appetite: normal  Medication side effects: No     Mental Status Evaluation:    Appearance:  dressed appropriately, casually dressed   Mood:  improved, anxious, euphoric   Affect: reactive, brighter, constricted, labile    Speech:  normal rate and volume, normal pitch, fluent   Thought Content:  normal, intrusive thoughts   Perceptual Disturbances: no auditory hallucinations, no visual hallucinations, denies auditory hallucinations when asked, does not appear responding to internal stimuli   Risk Potential: Suicidal ideation - None, contracts for safety on the unit  Homicidal ideation - None  Potential for aggression - No   Insight:  improving and impaired   Judgment: improving   Motor Activity: no abnormal movements         Laboratory results:  I have personally reviewed all pertinent laboratory results  Progress Toward Goals: improving    Assessment/Plan   Principal Problem:    Schizoaffective disorder, bipolar type with good prognostic features ([facility] [address] )    Recommended Treatment: Continue medication management and supportive therapy patient with a schizoaffective disorder, improved  The patient denied auditory hallucinations, no longer actively manic, improved insight and judgment lead the patient to sign release of information to the writer could talk to his mother about the discharge planning  Patient tolerated his medications well, sleep well last night    Takes Depakote the level is pending for Thursday  Planned medication and treatment changes: All current active medications have been reviewed  Continue treatment with group therapy, milieu therapy, occupational therapy and medication management  ** Please Note: This note has been constructed using a voice recognition system  **      BMP: No results for input(s): NA, K, CL, CO2, BUN in the last 72 hours      Invalid input(s): CREA, GLU  Vitals:    04/26/22 1545   BP: 141/95   Pulse: 99   Resp: 18   Temp: 98 8 °F (37 1 °C)   SpO2: 98%        Medication Administration - last 24 hours from 04/25/2022 2236 to 04/26/2022 2236       Date/Time Order Dose Route Action Action by     04/26/2022 0811 thiamine tablet 100 mg 100 mg Oral Given [facility]     76/44/3871 2090 folic acid (FOLVITE) tablet 1 mg 1 mg Oral Given [facility]     04/26/2022 0811 multivitamin-minerals (CENTRUM) tablet 1 tablet 1 tablet Oral Given [facility]     04/26/2022 0549 nicotine polacrilex (NICORETTE) gum 2 mg 2 mg Oral Given Mauricio Nelson RN     04/26/2022 0308 nicotine polacrilex (NICORETTE) gum 2 mg 2 mg Oral Given Mauricio Nelson RN     04/26/2022 0816 nicotine (NICODERM CQ) 21 mg/24 hr TD 24 hr patch 1 patch 1 patch Transdermal Not Given [facility]     04/26/2022 0811 nicotine (NICODERM CQ) 21 mg/24 hr TD 24 hr patch 1 patch 1 patch Transdermal Patch Removed [facility]     04/26/2022 0811 cholecalciferol (VITAMIN D3) tablet 1,000 Units 1,000 Units Oral Given [facility]     04/26/2022 0811 cyanocobalamin (VITAMIN B-12) tablet 500 mcg 500 mcg Oral Given [facility]     04/26/2022 1738 clonazePAM (KlonoPIN) tablet 0 25 mg 0 25 mg Oral Given [facility]     04/26/2022 0816 clonazePAM (KlonoPIN) tablet 0 25 mg 0 25 mg Oral Given [facility]     04/26/2022 0811 hydrochlorothiazide (HYDRODIURIL) tablet 12 5 mg 12 5 mg Oral Given [facility]     04/26/2022 0812 metoprolol tartrate (LOPRESSOR) tablet 12 5 mg 12 5 mg Oral Given [facility]     04/26/2022 0811 divalproex sodium (DEPAKOTE ER) 24 hr tablet 1,500 mg 1,500 mg Oral Given [facility]     04/26/2022 2113 metoprolol tartrate (LOPRESSOR) tablet 25 mg 25 mg Oral Given Angela Alexander RN     04/26/2022 1200 metoprolol tartrate (LOPRESSOR) tablet 25 mg 25 mg Oral Not Given Gisell Yanez

## 2022-04-28 VITALS
HEART RATE: 94 BPM | SYSTOLIC BLOOD PRESSURE: 152 MMHG | RESPIRATION RATE: 18 BRPM | DIASTOLIC BLOOD PRESSURE: 89 MMHG | OXYGEN SATURATION: 97 % | TEMPERATURE: 98.1 F | WEIGHT: 213.2 LBS | HEIGHT: 66 IN | BODY MASS INDEX: 34.27 KG/M2

## 2022-04-28 LAB — VALPROATE SERPL-MCNC: 40 UG/ML (ref 50–100)

## 2022-04-28 PROCEDURE — 99239 HOSP IP/OBS DSCHRG MGMT >30: CPT | Performed by: PSYCHIATRY & NEUROLOGY

## 2022-04-28 PROCEDURE — 80164 ASSAY DIPROPYLACETIC ACD TOT: CPT | Performed by: NURSE PRACTITIONER

## 2022-04-28 RX ORDER — MELATONIN
1000 DAILY
Qty: 30 TABLET | Refills: 0 | Status: SHIPPED | OUTPATIENT
Start: 2022-04-29

## 2022-04-28 RX ORDER — HYDROCHLOROTHIAZIDE 25 MG/1
25 TABLET ORAL DAILY
Qty: 30 TABLET | Refills: 0 | Status: SHIPPED | OUTPATIENT
Start: 2022-04-29

## 2022-04-28 RX ADMIN — HYDROCHLOROTHIAZIDE 25 MG: 25 TABLET ORAL at 08:31

## 2022-04-28 RX ADMIN — NICOTINE POLACRILEX 2 MG: 2 GUM, CHEWING BUCCAL at 05:11

## 2022-04-28 RX ADMIN — METOPROLOL TARTRATE 25 MG: 25 TABLET ORAL at 08:31

## 2022-04-28 RX ADMIN — DIVALPROEX SODIUM 1500 MG: 500 TABLET, EXTENDED RELEASE ORAL at 08:31

## 2022-04-28 RX ADMIN — CYANOCOBALAMIN TAB 500 MCG 500 MCG: 500 TAB at 08:31

## 2022-04-28 RX ADMIN — THIAMINE HCL TAB 100 MG 100 MG: 100 TAB at 08:31

## 2022-04-28 RX ADMIN — FOLIC ACID 1 MG: 1 TABLET ORAL at 08:31

## 2022-04-28 RX ADMIN — NICOTINE 1 PATCH: 21 PATCH, EXTENDED RELEASE TRANSDERMAL at 08:34

## 2022-04-28 RX ADMIN — NICOTINE POLACRILEX 2 MG: 2 GUM, CHEWING BUCCAL at 10:46

## 2022-04-28 RX ADMIN — Medication 1000 UNITS: at 08:31

## 2022-04-28 RX ADMIN — NICOTINE POLACRILEX 2 MG: 2 GUM, CHEWING BUCCAL at 12:50

## 2022-04-28 RX ADMIN — Medication 1 TABLET: at 08:31

## 2022-04-28 NOTE — PROGRESS NOTES
Patient D/C Belongings:    In Room - sweatshirt x1, tshirt x1, pj pants x1, socks x1, underwear x1    Storage - fleece coat x1, sneakers x1, sweatpants x1, t-shirt x1, socks x1, hoodie x1, snacks/drinks, superhero figurines, fishing reel, bracelet x1, light bulb, toiletries    Contraband - cell phone x1, lighter x1, keys x1    Safe bag returned by security

## 2022-04-28 NOTE — NURSING NOTE
Pt was discharged at 1304 from the unit  Discharge instructions were given to pt prior to discharge  Pt verbalized understanding  Pt was given belongings and escorted by staff to [place] [address] transport  Pt denied anxiety, depression, SI/HI/AVH  Pt was excited to be discharged and appeared bright

## 2022-04-28 NOTE — NURSING NOTE
Patient did not sleep thru the night  Patient woke up at 1:30am and went back to bed at 2:30 for about a half hour and then paced the halls

## 2022-04-28 NOTE — SOCIAL WORK
CM received response via extended care PT is scheduled for 1pm  today from [facility]

## 2022-04-28 NOTE — PROGRESS NOTES
Progress Note - Oniel Ferreira 42 y o  male MRN: [id number]    Unit/Bed#: [facility] 220-02 Encounter: [id number]        Subjective:   Patient seen and examined at bedside after reviewing the chart and discussing the case with the caring staff  Patient examined at bedside  Patient has no acute complaints  Patient is being discharged today, Thursday 04/28/2022  Patient is requesting all his scripts  I have reviewed and reconciled patient's problem list and medications  Physical Exam   Vitals: Blood pressure 152/89, pulse 94, temperature 98 1 °F (36 7 °C), temperature source Temporal, resp  rate 18, height 5' 6" (1 676 m), weight 96 7 kg (213 lb 3 2 oz), SpO2 97 %  ,Body mass index is 34 41 kg/m²  Constitutional: Patient appears well-developed  HEENT: PERR, EOMI, MMM  Cardiovascular: Normal rate and regular rhythm  Pulmonary/Chest: Effort normal and breath sounds normal    Abdomen: Soft, + BS, NT  Assessment/Plan:  Oniel Ferreira is a(n) 42y o  year old male with bipolar disorder  MEDICAL CLEARANCE:  Patient is medically cleared for discharge  All scripts were sent out for the patient      1  Cardiac with newly diagnosed hypertension, tachycardia  Patient started on HCTZ 25 mg daily along with Metoprolol tartrate 25 mg twice daily  2  Alcohol abuse  Patient has been put on thiamine, folic acid and multivitamin  3  Arthritis/headache  Patient may get Tylenol on as needed basis  4  Insomnia  Patient may get trazodone as needed  5  Vitamin-D deficiency  Patient started on vitamin-D supplements  6  Vitamin B12 deficiency  Patient started on vitamin B12 supplements  The patient was discussed with Dr Ralph Alanis and he is in agreement with the above note

## 2022-04-28 NOTE — PLAN OF CARE
Problem: Alteration in Thoughts and Perception  Goal: Treatment Goal: Gain control of psychotic behaviors/thinking, reduce/eliminate presenting symptoms and demonstrate improved reality functioning upon discharge  Outcome: Adequate for Discharge     Problem: Alteration in Thoughts and Perception  Goal: Verbalize thoughts and feelings  Description: Interventions:  - Promote a nonjudgmental and trusting relationship with the patient through active listening and therapeutic communication  - Assess patient's level of functioning, behavior and potential for risk  - Engage patient in 1 on 1 interactions  - Encourage patient to express fears, feelings, frustrations, and discuss symptoms    - [place] patient to reality, help patient recognize reality-based thinking   - Administer medications as ordered and assess for potential side effects  - Provide the patient education related to the signs and symptoms of the illness and desired effects of prescribed medications  Interventions:  - Assess and re-assess patient's level of risk   - Engage patient in 1:1 interactions, daily, for a minimum of 15 minutes   - Encourage patient to express feelings, fears, frustrations, hopes   Outcome: Adequate for Discharge     Problem: Alteration in Thoughts and Perception  Goal: Refrain from acting on delusional thinking/internal stimuli  Description: Interventions:  - Monitor patient closely, per order   - Utilize least restrictive measures   - Set reasonable limits, give positive feedback for acceptable   - Administer medications as ordered and monitor of potential side effects  Outcome: Adequate for Discharge     Problem: Alteration in Thoughts and Perception  Goal: Agree to be compliant with medication regime, as prescribed and report medication side effects  Description: Interventions:  - Offer appropriate PRN medication and supervise ingestion; conduct AIMS, as needed   Outcome: Adequate for Discharge     Problem: Alteration in Thoughts and Perception  Goal: Attend and participate in unit activities, including therapeutic, recreational, and educational groups  Description: Interventions:  -Encourage Visitation and family involvement in care  Interventions:  - Provide therapeutic and educational activities daily, encourage attendance and participation, and document same in the medical record   Outcome: Adequate for Discharge     Problem: Alteration in Thoughts and Perception  Goal: Recognize dysfunctional thoughts, communicate reality-based thoughts at the time of discharge  Description: Interventions:  - Provide medication and psycho-education to assist patient in compliance and developing insight into his/her illness   Outcome: Adequate for Discharge     Problem: Alteration in Thoughts and Perception  Goal: Complete daily ADLs, including personal hygiene independently, as able  Description: Interventions:  - Observe, teach, and assist patient with ADLS  - Monitor and promote a balance of rest/activity, with adequate nutrition and elimination   Interventions:  - Observe, teach, and assist patient with ADLS  -  Monitor and promote a balance of rest/activity, with adequate nutrition and elimination   Outcome: Adequate for Discharge

## 2022-04-28 NOTE — BH TRANSITION RECORD
Contact Information: If you have any questions, concerns, pended studies, tests and/or procedures, or emergencies regarding your inpatient behavioral health visit  Please contact Lico Huff[facility] behavioral health unit [phone] and ask to speak to a , nurse or physician  A contact is available 24 hours/ 7 days a week at this number  Summary of Procedures Performed During your Stay:  Below is a list of major procedures performed during your hospital stay and a summary of results:  - No major procedures performed  Pending Studies (From admission, onward)     Start     Ordered    04/28/22 0700  Valproic acid level, total  Morning draw         04/25/22 1440              If studies are pending at discharge, follow up with your PCP and/or referring provider

## 2022-04-28 NOTE — PROGRESS NOTES
04/28/22 0700   Activity/Group Checklist   Group   (Community Meeting Group and Processing)   Attendance Attended   Attendance Duration (min) 46-60   Interactions Interacted appropriately   Affect/Mood Appropriate;Bright   Goals Achieved Identified feelings; Discussed coping strategies; Able to listen to others; Able to engage in interactions

## 2022-04-28 NOTE — DISCHARGE INSTRUCTIONS
Schizoaffective Disorder   WHAT YOU NEED TO KNOW:   Schizoaffective disorder is a long-term mental illness that may change how you think, feel, and act around others  You may not know what is real and what is not real    DISCHARGE INSTRUCTIONS:   Medicines:   · Antipsychotics: These medicines help decrease psychotic symptoms or severe agitation  You may need antiparkinson medicine to control muscle stiffness, twitches, and restlessness caused by antipsychotic medicines  · Antianxiety medicine: This medicine may be given to decrease anxiety and help you feel calm and relaxed  · Antidepressants: These medicines are given to decrease or stop the symptoms of depression, anxiety, and behavior problems  · Mood stabilizers: These medicines help control mood swings  · Anticonvulsants: This medicine is given to control seizures  It may also be used to decrease violent behavior and control your mood swings  · Blood pressure medicines: These may be used to help decrease motor tics (uncontrolled movements)  They may also help you feel calmer, more focused, and less irritable  · Anticholinergics: This medicine decreases the side effects of other medicines  · Take your medicine as directed  Contact your healthcare provider if you think your medicine is not helping or if you have side effects  Tell him or her if you are allergic to any medicine  Keep a list of the medicines, vitamins, and herbs you take  Include the amounts, and when and why you take them  Bring the list or the pill bottles to follow-up visits  Carry your medicine list with you in case of an emergency  Follow up with your healthcare provider or psychiatrist as directed: You may need to return to have your blood pressure and other symptoms checked  You may need blood tests to check the level of medicine in your blood  Write down your questions so you remember to ask them during your visits  Manage your symptoms:   The following may help you feel better or prevent symptoms of schizoaffective disorder from coming back:  · Find support for yourself and your family:  Talk with others to help you cope with your illness better  This may also help to improve how you relate to others  · Keep all medical appointments: This will help manage your disease and the side-effects from medicines you may be taking  · Use your medicines as directed:  Put your medicines in a pillbox placed in an area you can easily see  Use a watch with an alarm to help you remember when it is time to take your medicine  Tell your healthcare provider if you know or think you might be pregnant  Do not stop taking your medicines without your healthcare provider's okay  A sudden stop can cause serious medical problems  · Watch for early signs of a relapse and seek help immediately:      ? How you think, feel, and see things has changed  ? You behave differently than usual     ? You become more nervous and upset, but do not know why     ? You eat less and have trouble sleeping  ? You have little or no interest in friends or activities  For support and more information:   · [employer]  [address] [facility], [facility] [address] , [address]  Phone: [phone]  Phone: [phone]  Web Address: [employer] br  org    · [address] [facility], Office of [employer]  [address] [place] [address] [place], [address]  [place], [place] [address]   Phone: [phone]  Phone: [phone]  Web Address: [employer] tn    Contact your healthcare provider or psychiatrist if:   · You think you are having a relapse  · You are having side effects from your medicine, or they are not helping      · You are not sleeping well or are sleeping more than usual     · You cannot eat or are eating more than usual     · You have muscle spasms, stiffness, or trouble walking  · Your sad feelings or thoughts change the way you function during the day  · You have questions or concerns about your condition or care  Seek care immediately or call 911 if:   · You feel like hurting or killing yourself or others  · You feel that your condition is getting worse  · You feel very upset, threaten someone, or you feel violent  · You suddenly have changes in your vision  · You suddenly have chest pain, trouble breathing, or a fever  © Copyright [employer] 2022 Information is for End User's use only and may not be sold, redistributed or otherwise used for commercial purposes  All illustrations and images included in CareNotes® are the copyrighted property of A D A M , Inc  or [facility] Susie Hussein   The above information is an  only  It is not intended as medical advice for individual conditions or treatments  Talk to your doctor, nurse or pharmacist before following any medical regimen to see if it is safe and effective for you  Metabolic Syndrome X   WHAT YOU NEED TO KNOW:   What is metabolic syndrome? Metabolic syndrome is a group of medical conditions that can increase your risk for heart disease, stroke, or type 2 diabetes  You may have metabolic syndrome if you have at least 3 of the following medical conditions:  · High triglycerides (a type of fat in your blood)    · Low HDL cholesterol (good cholesterol)    · High blood pressure    · High blood sugar levels    · Extra abdominal fat    What increases my risk for metabolic syndrome? The exact cause of metabolic syndrome is not known  Your risk for metabolic syndrome increases if you have insulin resistance  Insulin is a hormone that helps your body take sugar out of your blood and use it for energy  Insulin resistance means your pancreas keeps making insulin but your body cannot use it correctly   Your risk for metabolic syndrome also increases as you age, if you are overweight or obese, or you are not physically active  What are the signs and symptoms of metabolic syndrome? Most people with metabolic syndrome do not have any signs or symptoms  You may have more thirst or hunger than usual, urinate more often, or have blurred vision or headaches  How is metabolic syndrome diagnosed? Your healthcare provider will examine you and ask about other medical conditions you may have  He or she may ask if you have a family history of metabolic syndrome, diabetes, obesity, or heart disease  · Blood tests  are used to check your glucose and cholesterol levels  · An oral glucose tolerance test  is used to check how glucose changes your blood sugar level  Your blood sugar level is tested after you fast for 8 hours  Then you are given a glucose drink, and your blood sugar level is tested again  The test measures how high your blood sugar level rises from the glucose drink  How is metabolic syndrome treated? You may need any of the following, depending on health conditions you have:  · Blood pressure medicine  helps lower your blood pressure  A controlled blood pressure helps protect your organs, such as your heart, lungs, brain, and kidneys  Take your blood pressure medicine exactly as directed  · Cholesterol medicine  helps lower the amount of cholesterol in your blood  Cholesterol medicine works best if you are also physically active and eat healthy foods that are low in certain kinds of fats  Some cholesterol medicines may cause liver problems  You may need to have blood tests while you are taking this medicine  · Hypoglycemia medicine  helps lower the amount of sugar in your blood  How can I manage metabolic syndrome? · Maintain a healthy weight  Ask your healthcare provider what a healthy weight is for you  Ask him or her to help you create a weight loss plan if you are overweight  Weight loss can help lower cholesterol, triglycerides, blood pressure, and blood glucose levels   It can also raise HDL (good cholesterol)  · Limit sodium (salt) as directed  Too much sodium can affect your fluid balance and blood pressure  Your healthcare provider will tell you how much sodium and potassium are safe for you to have in a day  He or she may recommend that you limit sodium to 2,300 mg a day  Your provider or a dietitian can help you find ways to limit sodium  For example, if you add salt while you cook, do not add more salt at the table  Check labels to find low-sodium or no-salt-added foods  Some low-sodium foods use potassium salts for flavor  Too much potassium can also cause health problems  · Follow the meal plan recommended by your healthcare provider  A dietitian or your provider can give you more information on low-sodium plans or the DASH (Dietary Approaches to Stop Hypertension) eating plan  The DASH plan is low in sodium, processed sugar, unhealthy fats, and total fat  It is high in potassium, calcium, and fiber  It is high in potassium, calcium, and fiber  These can be found in vegetables, fruit, and whole-grain foods  · Be physically active throughout the day  Physical activity, such as exercise, can help lower your blood pressure, cholesterol, and blood sugar levels  Exercise can also help raise your HDL level and help you to lose weight  Ask your healthcare provider to help you create a physical activity plan  Aim to get at least 30 minutes of exercise, 5 days each week  You can break activity into smaller amounts that add up to 30 minutes  Also include strength training at least 2 times each week  Your healthcare providers can help you create a physical activity plan  · Check your blood pressure as directed  You may be asked to keep a record of your blood pressure and bring it with you to follow-up visits  Ask your healthcare provider what your blood pressure should be and how to check it  · Limit or do not drink alcohol    Women should limit alcohol to 1 drink a day  Men should limit alcohol to 2 drinks a day  A drink of alcohol is 12 ounces of beer, 5 ounces of wine, or 1½ ounces of liquor  · Do not smoke  Nicotine and other chemicals in cigarettes and cigars can increase your blood pressure and also cause lung damage  Ask your healthcare provider for information if you currently smoke and need help to quit  E-cigarettes or smokeless tobacco still contain nicotine  Talk to your healthcare provider before you use these products  Call your local emergency number (911 in the [address]) or have someone call if:   · You have chest pain or discomfort that spreads to your arms, jaw, or back  · You have a severe headache or dizziness  · You have trouble thinking, speaking, or understanding others  When should I seek immediate care? · Your blood pressure is higher than your healthcare provider told you it should be  When should I call my doctor? · You have more thirst or hunger than usual     · You urinate more frequently  · You have blurred vision  · You have questions or concerns about your condition or care  CARE AGREEMENT:   You have the right to help plan your care  Learn about your health condition and how it may be treated  Discuss treatment options with your healthcare providers to decide what care you want to receive  You always have the right to refuse treatment  The above information is an  only  It is not intended as medical advice for individual conditions or treatments  Talk to your doctor, nurse or pharmacist before following any medical regimen to see if it is safe and effective for you  © Copyright [employer] 2022 Information is for End User's use only and may not be sold, redistributed or otherwise used for commercial purposes   All illustrations and images included in CareNotes® are the copyrighted property of A [employer]  or [employer]

## 2022-04-29 NOTE — DISCHARGE SUMMARY
Discharge Summary - [facility] [age] y o  male MRN: [id number]  Unit/Bed#: [facility] 537-89 Encounter: [id number]     Admission Date: 4/18/2022         Discharge Date: 4/28/2022      Attending Psychiatrist: ARTHUR Ruvalcaba     Reason for Admission/HPI:     Phuong Yanes is a 42 y o  male with a history of bipolar disorder versus schizoaffective disorder who was admitted to the inpatient psychiatric unit on a involuntary 302 commitment basis due to mixed symptoms of bipolar disorder, unstable mood, psychotic symptoms and delusional thoughts      Symptoms prior to admission included mood swings, manic symptoms, increased irritability, racing thoughts, increase in goal directed activity and delusional thoughts  Onset of symptoms was abrupt starting several days ago with progressively worsening course since that time  Stressors preceding admission included chronic mental illness and noncompliance with treatment       On initial evaluation after admission to the inpatient psychiatric unit the patient   Initially he had symptoms of psychomotor agitation with rapid speech rapid movement racing thoughts but could be redirected by detailed discussion of the patient passed his love to his grandmother who was the [address] [place] native, his struggle with  Police, and distress to 1 his brothers while saying that other brother is well helpful  Because of rapid speech, and concentration on his stressors in the past and psychological trauma the patient was not the best historian, however he was not aggressive or angry after admission in overall agreed with treatment plan  Hospital Course:       Mr Deidra Bales  was admitted and all appropriate precautions were started  Pt was oriented to the unit  His treatment, including medication management and therapy was discussed with the patient, and  he agreed   Risks, benefits, and possible side effects of medications explained to patient and patient verbalizes understanding and agreement for treatment  During the hospitalization the patient was encouraged to attend individual therapy, group therapy, milieu therapy and occupational therapy  Patient initially had symptoms of citlali with psychosis, having paranoid ideations regarding his brother who submitted initial 36 petition, and admitted hearing voices of people from the distance, voices telling negative staff about the patient  He admitted the need of medications showing some insight into the need for treatment, selected Invega and [place] was provided  The patient stated he would rather take shot once a month rather than taking daily pills, however his mood remained hypomanic with rapid mood changes after Sangeetha Colla was provided, Depakote was started with no side effects and improvement in patient's mood  By the day of discharge patient no longer had any paranoid ideations and consistently denied auditory hallucinations  His affect remained slightly restricted, it is unclear if this changed restriction of affect is the residual symptoms of his bipolar disorder or effect of his posttraumatic history  Pt agreed to start his medication after discussion of potential benefits and side effects  I discussed the medication regimen and possible side effects of the medications with the patient prior to discharge  At the time of discharge Mr Nubia Calvillo was tolerating psychiatric medications  Please see After Discharge Summary for a completed list of discharge medications  Safety plan was discussed and approved by the patient  He was not manic, depressed, angry, or confused or psychotic on a day of discharge and was accountable for his actions  The patient was accepted by his mother to live with her  Patient's mother also believed in patient's improvement  He stated to the writer that his only son who help his mother with daily chores and household activity       I discussed outpatient follow up with the patient, was arranged by the unit  upon discharge  Safety plan was discussed and approved by Mr   Reviewed with the patient importance of compliance with medications and outpatient treatment after discharge  The patient was competent to understand of risks and benefits of his actions  Mental Status at time of Discharge:     Mental Status Evaluation:    Appearance:  dressed appropriately, casually dressed   Behavior:  pleasant, cooperative, calm   Mood:  improved   Affect: appropriate, brighter, constricted    Speech:  normal rate and volume, normal pitch   Language: appropriate   Thought Process:  concrete and logical   Associations: concrete associations   Thought Content:  normal, paranoid ideation is resolved   Perceptual Disturbances: no auditory hallucinations, no visual hallucinations, denies auditory hallucinations when asked, does not appear responding to internal stimuli   Risk Potential: Suicidal ideation - None  Homicidal ideation - None  Potential for aggression - No   Sensorium:  oriented to person, place and time   Memory:  recent and remote memory grossly intact   Consciousness:  alert and awake   Attention: attention span and concentration are normal   Intellect: normal   Fund of Knowledge: awareness of current events appropriate   Insight:  limited   Judgment: improved   Muscle Tone: normal   Gait/Station: normal gait/station and normal balance   Motor Activity: no abnormal movements         Discharge Diagnosis:   Patient Active Problem List   Diagnosis    Alcohol abuse, continuous    Cann[facility] dependence, [facility] ([facility] )    Schizoaffective disorder, bipolar type with good prognostic features ([facility] )       Discharge Medications:  See after visit summary for reconciled discharge medications provided to patient and family  Discharge instructions/Information to patient and family:   See after visit summary for information provided to patient and family        Provisions for Follow-Up Care:  See after visit summary for information related to follow-up care and any pertinent home health orders  Discharge Statement   I spent 40 minutes discharging the patient  This time was spent on the day of discharge  I had direct contact with the patient on the day of discharge  Additional documentation is required if more than 30 minutes were spent on discharge  Portions of the record may have been created with voice recognition software

## 2022-05-30 ENCOUNTER — HOSPITAL ENCOUNTER (EMERGENCY)
Facility: HOSPITAL | Age: 47
Discharge: HOME | End: 2022-05-30
Attending: EMERGENCY MEDICINE | Admitting: EMERGENCY MEDICINE
Payer: MEDICARE

## 2022-05-30 ENCOUNTER — APPOINTMENT (EMERGENCY)
Dept: CT IMAGING | Facility: HOSPITAL | Age: 47
End: 2022-05-30
Payer: MEDICARE

## 2022-05-30 VITALS
HEIGHT: 66 IN | DIASTOLIC BLOOD PRESSURE: 93 MMHG | RESPIRATION RATE: 19 BRPM | BODY MASS INDEX: 34.37 KG/M2 | TEMPERATURE: 97.5 F | HEART RATE: 94 BPM | SYSTOLIC BLOOD PRESSURE: 154 MMHG | OXYGEN SATURATION: 95 % | WEIGHT: 213.85 LBS

## 2022-05-30 DIAGNOSIS — R55 SYNCOPE: Primary | ICD-10-CM

## 2022-05-30 DIAGNOSIS — R91.1 RIGHT MIDDLE LOBE PULMONARY NODULE: ICD-10-CM

## 2022-05-30 DIAGNOSIS — R25.1 TREMULOUSNESS: ICD-10-CM

## 2022-05-30 LAB
2HR DELTA HS TROPONIN: 0 NG/L
ALBUMIN SERPL BCP-MCNC: 4.2 G/DL (ref 3.5–5)
ALP SERPL-CCNC: 67 U/L (ref 34–104)
ALT SERPL W P-5'-P-CCNC: 27 U/L (ref 7–52)
ANION GAP SERPL CALCULATED.3IONS-SCNC: 9 MMOL/L (ref 4–13)
APAP SERPL-MCNC: <10 UG/ML (ref 10–20)
APTT PPP: 28 SECONDS (ref 23–37)
AST SERPL W P-5'-P-CCNC: 23 U/L (ref 13–39)
BASOPHILS # BLD AUTO: 0.04 THOUSANDS/ΜL (ref 0–0.1)
BASOPHILS NFR BLD AUTO: 0 % (ref 0–1)
BILIRUB SERPL-MCNC: 0.43 MG/DL (ref 0.2–1)
BNP SERPL-MCNC: 9 PG/ML (ref 0–100)
BUN SERPL-MCNC: 12 MG/DL (ref 5–25)
CALCIUM SERPL-MCNC: 9.4 MG/DL (ref 8.4–10.2)
CARDIAC TROPONIN I PNL SERPL HS: 3 NG/L
CARDIAC TROPONIN I PNL SERPL HS: 3 NG/L
CHLORIDE SERPL-SCNC: 103 MMOL/L (ref 96–108)
CK MB SERPL-MCNC: 1.4 % (ref 0–2.5)
CK MB SERPL-MCNC: 3.1 NG/ML (ref 0.6–6.3)
CK SERPL-CCNC: 218 U/L (ref 39–308)
CO2 SERPL-SCNC: 23 MMOL/L (ref 21–32)
CREAT SERPL-MCNC: 1.02 MG/DL (ref 0.6–1.3)
EOSINOPHIL # BLD AUTO: 0.26 THOUSAND/ΜL (ref 0–0.61)
EOSINOPHIL NFR BLD AUTO: 3 % (ref 0–6)
ERYTHROCYTE [DISTWIDTH] IN BLOOD BY AUTOMATED COUNT: 13.2 % (ref 11.6–15.1)
ETHANOL SERPL-MCNC: <10 MG/DL
GFR SERPL CREATININE-BSD FRML MDRD: 88 ML/MIN/1.73SQ M
GLUCOSE SERPL-MCNC: 102 MG/DL (ref 65–140)
HCT VFR BLD AUTO: 43.1 % (ref 36.5–49.3)
HGB BLD-MCNC: 15.2 G/DL (ref 12–17)
IMM GRANULOCYTES # BLD AUTO: 0.03 THOUSAND/UL (ref 0–0.2)
IMM GRANULOCYTES NFR BLD AUTO: 0 % (ref 0–2)
INR PPP: 1.08 (ref 0.84–1.19)
LYMPHOCYTES # BLD AUTO: 3.45 THOUSANDS/ΜL (ref 0.6–4.47)
LYMPHOCYTES NFR BLD AUTO: 38 % (ref 14–44)
MAGNESIUM SERPL-MCNC: 1.9 MG/DL (ref 1.9–2.7)
MCH RBC QN AUTO: 31.9 PG (ref 26.8–34.3)
MCHC RBC AUTO-ENTMCNC: 35.3 G/DL (ref 31.4–37.4)
MCV RBC AUTO: 91 FL (ref 82–98)
MONOCYTES # BLD AUTO: 0.76 THOUSAND/ΜL (ref 0.17–1.22)
MONOCYTES NFR BLD AUTO: 8 % (ref 4–12)
NEUTROPHILS # BLD AUTO: 4.58 THOUSANDS/ΜL (ref 1.85–7.62)
NEUTS SEG NFR BLD AUTO: 51 % (ref 43–75)
NRBC BLD AUTO-RTO: 0 /100 WBCS
PHOSPHATE SERPL-MCNC: 3.9 MG/DL (ref 2.7–4.5)
PLATELET # BLD AUTO: 262 THOUSANDS/UL (ref 149–390)
PMV BLD AUTO: 9.1 FL (ref 8.9–12.7)
POTASSIUM SERPL-SCNC: 3.8 MMOL/L (ref 3.5–5.3)
PROT SERPL-MCNC: 6.9 G/DL (ref 6.4–8.4)
PROTHROMBIN TIME: 13.9 SECONDS (ref 11.6–14.5)
RBC # BLD AUTO: 4.76 MILLION/UL (ref 3.88–5.62)
SALICYLATES SERPL-MCNC: <5 MG/DL (ref 3–20)
SODIUM SERPL-SCNC: 135 MMOL/L (ref 135–147)
VALPROATE SERPL-MCNC: <10 UG/ML (ref 50–100)
WBC # BLD AUTO: 9.12 THOUSAND/UL (ref 4.31–10.16)

## 2022-05-30 PROCEDURE — 36415 COLL VENOUS BLD VENIPUNCTURE: CPT | Performed by: EMERGENCY MEDICINE

## 2022-05-30 PROCEDURE — 96374 THER/PROPH/DIAG INJ IV PUSH: CPT

## 2022-05-30 PROCEDURE — 80143 DRUG ASSAY ACETAMINOPHEN: CPT | Performed by: EMERGENCY MEDICINE

## 2022-05-30 PROCEDURE — 70450 CT HEAD/BRAIN W/O DYE: CPT

## 2022-05-30 PROCEDURE — 71250 CT THORAX DX C-: CPT

## 2022-05-30 PROCEDURE — 85025 COMPLETE CBC W/AUTO DIFF WBC: CPT | Performed by: EMERGENCY MEDICINE

## 2022-05-30 PROCEDURE — 80164 ASSAY DIPROPYLACETIC ACD TOT: CPT | Performed by: EMERGENCY MEDICINE

## 2022-05-30 PROCEDURE — 99285 EMERGENCY DEPT VISIT HI MDM: CPT | Performed by: EMERGENCY MEDICINE

## 2022-05-30 PROCEDURE — 82077 ASSAY SPEC XCP UR&BREATH IA: CPT | Performed by: EMERGENCY MEDICINE

## 2022-05-30 PROCEDURE — 74176 CT ABD & PELVIS W/O CONTRAST: CPT

## 2022-05-30 PROCEDURE — 80179 DRUG ASSAY SALICYLATE: CPT | Performed by: EMERGENCY MEDICINE

## 2022-05-30 PROCEDURE — 85730 THROMBOPLASTIN TIME PARTIAL: CPT | Performed by: EMERGENCY MEDICINE

## 2022-05-30 PROCEDURE — 80053 COMPREHEN METABOLIC PANEL: CPT | Performed by: EMERGENCY MEDICINE

## 2022-05-30 PROCEDURE — 83880 ASSAY OF NATRIURETIC PEPTIDE: CPT | Performed by: EMERGENCY MEDICINE

## 2022-05-30 PROCEDURE — 93005 ELECTROCARDIOGRAM TRACING: CPT

## 2022-05-30 PROCEDURE — 96361 HYDRATE IV INFUSION ADD-ON: CPT

## 2022-05-30 PROCEDURE — 82550 ASSAY OF CK (CPK): CPT | Performed by: EMERGENCY MEDICINE

## 2022-05-30 PROCEDURE — 82553 CREATINE MB FRACTION: CPT | Performed by: EMERGENCY MEDICINE

## 2022-05-30 PROCEDURE — 99284 EMERGENCY DEPT VISIT MOD MDM: CPT

## 2022-05-30 PROCEDURE — 84484 ASSAY OF TROPONIN QUANT: CPT | Performed by: EMERGENCY MEDICINE

## 2022-05-30 PROCEDURE — 85610 PROTHROMBIN TIME: CPT | Performed by: EMERGENCY MEDICINE

## 2022-05-30 PROCEDURE — 84100 ASSAY OF PHOSPHORUS: CPT | Performed by: EMERGENCY MEDICINE

## 2022-05-30 PROCEDURE — 83735 ASSAY OF MAGNESIUM: CPT | Performed by: EMERGENCY MEDICINE

## 2022-05-30 RX ORDER — LORAZEPAM 2 MG/ML
1 INJECTION INTRAMUSCULAR ONCE
Status: COMPLETED | OUTPATIENT
Start: 2022-05-30 | End: 2022-05-30

## 2022-05-30 RX ADMIN — SODIUM CHLORIDE 1000 ML: 0.9 INJECTION, SOLUTION INTRAVENOUS at 20:04

## 2022-05-30 RX ADMIN — LORAZEPAM 1 MG: 2 INJECTION INTRAMUSCULAR; INTRAVENOUS at 20:11
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
## 2022-05-31 NOTE — DISCHARGE INSTRUCTIONS
No definite cause of your symptoms was discovered  CT head showed no acute intracranial abnormality  Follow up with your primary doctor and with neurology, and return to the emergency department for new or worsening symptoms  Do not drive or operate heavy machinery until you are cleared by a neurologist   CT imaging showed a right pulmonary nodule  Follow up with your primary doctor for further evaluation  CT CHEST, ABDOMEN AND PELVIS WITHOUT IV CONTRAST     INDICATION:   seizure vs  syncope, abdominal distension  COMPARISON:  5/25/2017 chest radiograph     TECHNIQUE: CT examination of the chest, abdomen and pelvis was performed without intravenous contrast  This examination was performed without intravenous contrast in the context of the critical nationwide Omnipaque shortage  Axial, sagittal, and coronal   2D reformatted images were created from the source data and submitted for interpretation  Radiation dose length product (DLP) for this visit:  1040 7 mGy-cm   This examination, like all CT scans performed in the [facility], was performed utilizing techniques to minimize radiation dose exposure, including the use of iterative   reconstruction and automated exposure control  Enteric contrast was not administered  FINDINGS:     CHEST     LUNGS: Mild emphysema  No focal consolidation or suspicious mass  0 2 cm right middle lobe pulmonary nodule (series 204, image 83)  Small cluster of tree-in-bud nodules in the superior segment right lower lobe  There is no tracheal or endobronchial   lesion  PLEURA:  Unremarkable  HEART/GREAT VESSELS: Heart is unremarkable for patient's age  No thoracic aortic aneurysm  MEDIASTINUM AND ASHA:  Unremarkable  CHEST WALL AND LOWER NECK:  Unremarkable  ABDOMEN     LIVER/BILIARY TREE:  Unremarkable  GALLBLADDER:  No calcified gallstones  No pericholecystic inflammatory change  SPLEEN:  Unremarkable  PANCREAS:  Unremarkable  ADRENAL GLANDS:  Unremarkable  KIDNEYS/URETERS:  Unremarkable  No hydronephrosis  STOMACH AND BOWEL:  There is colonic diverticulosis without evidence of acute diverticulitis  APPENDIX:  A normal appendix was visualized  ABDOMINOPELVIC CAVITY:  No ascites  No pneumoperitoneum  No lymphadenopathy  VESSELS:  Unremarkable for patient's age  PELVIS     REPRODUCTIVE ORGANS:  Unremarkable for patient's age  URINARY BLADDER:  Unremarkable  ABDOMINAL WALL/INGUINAL REGIONS:  Unremarkable  OSSEOUS STRUCTURES:  No acute fracture or destructive osseous lesion  Severe L5-S1 degenerative disc disease  IMPRESSION:     No acute abnormality identified  0 2 cm right middle lobe pulmonary nodule  Based on current Fleischner Society 2017 Guidelines on incidental pulmonary nodule, follow-up CT at 12 months can be considered

## 2022-06-01 LAB
ATRIAL RATE: 86 BPM
P AXIS: 45 DEGREES
PR INTERVAL: 142 MS
QRS AXIS: -31 DEGREES
QRSD INTERVAL: 90 MS
QT INTERVAL: 354 MS
QTC INTERVAL: 423 MS
T WAVE AXIS: 30 DEGREES
VENTRICULAR RATE: 86 BPM

## 2022-06-01 PROCEDURE — 93010 ELECTROCARDIOGRAM REPORT: CPT | Performed by: INTERNAL MEDICINE

## 2022-08-25 NOTE — ED NOTES
Bed Search    [place]:   No beds per [facility]:  No beds per [facility]:  Previously denied for acutity  [facility]:  No beds per [facility]:  No beds per [facility]:  No answer  [facility]  No appropriate beds per [facility]:  No beds per Michael Saldana 1 person assist

## 2023-08-09 ENCOUNTER — HOSPITAL ENCOUNTER (EMERGENCY)
Facility: HOSPITAL | Age: 48
End: 2023-08-11
Attending: EMERGENCY MEDICINE
Payer: MEDICARE

## 2023-08-09 DIAGNOSIS — Z00.8 MEDICAL CLEARANCE FOR PSYCHIATRIC ADMISSION: ICD-10-CM

## 2023-08-09 DIAGNOSIS — R46.2 BIZARRE BEHAVIOR: ICD-10-CM

## 2023-08-09 DIAGNOSIS — R45.1 AGITATION REQUIRING SEDATION PROTOCOL: Primary | ICD-10-CM

## 2023-08-09 LAB
AMPHETAMINES SERPL QL SCN: NEGATIVE
BARBITURATES UR QL: NEGATIVE
BENZODIAZ UR QL: NEGATIVE
COCAINE UR QL: NEGATIVE
ETHANOL EXG-MCNC: 0 MG/DL
METHADONE UR QL: NEGATIVE
OPIATES UR QL SCN: NEGATIVE
OXYCODONE+OXYMORPHONE UR QL SCN: NEGATIVE
PCP UR QL: NEGATIVE
THC UR QL: POSITIVE

## 2023-08-09 PROCEDURE — 80307 DRUG TEST PRSMV CHEM ANLYZR: CPT | Performed by: EMERGENCY MEDICINE

## 2023-08-09 PROCEDURE — 82075 ASSAY OF BREATH ETHANOL: CPT | Performed by: EMERGENCY MEDICINE

## 2023-08-09 PROCEDURE — 99283 EMERGENCY DEPT VISIT LOW MDM: CPT

## 2023-08-09 PROCEDURE — 99291 CRITICAL CARE FIRST HOUR: CPT | Performed by: EMERGENCY MEDICINE

## 2023-08-09 RX ORDER — HALOPERIDOL 5 MG/1
5 TABLET ORAL ONCE
Status: DISCONTINUED | OUTPATIENT
Start: 2023-08-09 | End: 2023-08-09

## 2023-08-09 RX ORDER — LORAZEPAM 2 MG/ML
2 INJECTION INTRAMUSCULAR ONCE
Status: DISCONTINUED | OUTPATIENT
Start: 2023-08-09 | End: 2023-08-09

## 2023-08-09 RX ORDER — HALOPERIDOL 5 MG/1
5 TABLET ORAL ONCE
Status: COMPLETED | OUTPATIENT
Start: 2023-08-10 | End: 2023-08-09

## 2023-08-09 RX ORDER — LORAZEPAM 1 MG/1
2 TABLET ORAL ONCE
Status: DISCONTINUED | OUTPATIENT
Start: 2023-08-09 | End: 2023-08-09

## 2023-08-09 RX ORDER — LORAZEPAM 1 MG/1
1 TABLET ORAL ONCE
Status: DISCONTINUED | OUTPATIENT
Start: 2023-08-10 | End: 2023-08-09

## 2023-08-09 RX ORDER — LORAZEPAM 1 MG/1
2 TABLET ORAL ONCE
Status: COMPLETED | OUTPATIENT
Start: 2023-08-10 | End: 2023-08-09

## 2023-08-09 RX ORDER — HALOPERIDOL 5 MG/ML
5 INJECTION INTRAMUSCULAR ONCE
Status: DISCONTINUED | OUTPATIENT
Start: 2023-08-09 | End: 2023-08-09

## 2023-08-09 RX ADMIN — HALOPERIDOL 5 MG: 5 TABLET ORAL at 23:49

## 2023-08-09 RX ADMIN — LORAZEPAM 2 MG: 1 TABLET ORAL at 23:51

## 2023-08-10 PROCEDURE — 99245 OFF/OP CONSLTJ NEW/EST HI 55: CPT | Performed by: PSYCHIATRY & NEUROLOGY

## 2023-08-10 NOTE — ED NOTES
Insurance Authorization for admission:   Phone call placed to [employer]. Phone number: [phone]. Spoke to [employer]    5 days approved. Level of care: 302-303 [facility]   Review on- 8/11/2023 (or TBD once placed)   Authorization # Facility to call when patient arrives         PROMISe:  Eligible,  [facility], [facility]. - [facility] ID # [id number]    Pt is currently under a 302 involuntary psychiatric commitment.  A  303 will be upheld, the hearing is scheduled for 8/11/2023.

## 2023-08-10 NOTE — ED NOTES
Patient observed sleeping, RR equal, non labored, spontaneous, no signs of distress.      Kristina Daniel RN  08/10/23 1932
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
## 2023-08-10 NOTE — ED NOTES
Dr. Deryl Carrel with psychiatry recommends [phone]. Faxed 303 to [facility]. Sadia Chase, [facility] was advised of the faxed 303. She will forward to [facility] who will call back with hearing information.
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
## 2023-08-10 NOTE — ED CARE HANDOFF
Emergency Department Sign Out Note        Signout and transfer of care from my colleague, Dr. Alix Hummel. See Separate Emergency Department note. The patient, Erica Barba, was evaluated for agitation, bizarre behavior. Labs Reviewed   RAPID DRUG SCREEN, URINE - Abnormal       Result Value Ref Range Status    Amph/Meth UR Negative  Negative Final    Barbiturate Ur Negative  Negative Final    Benzodiazepine Urine Negative  Negative Final    Cocaine Urine Negative  Negative Final    Methadone Urine Negative  Negative Final    Opiate Urine Negative  Negative Final    PCP Ur Negative  Negative Final    THC Urine Positive (*) Negative Final    Oxycodone Urine Negative  Negative Final    Narrative:     Presumptive report. If requested, specimen will be sent to reference lab for confirmation. FOR MEDICAL PURPOSES ONLY. IF CONFIRMATION NEEDED PLEASE CONTACT THE LAB WITHIN 5 DAYS. Drug Screen Cutoff Levels:  AMPHETAMINE/METHAMPHETAMINES  1000 ng/mL  BARBITURATES     200 ng/mL  BENZODIAZEPINES     200 ng/mL  COCAINE      300 ng/mL  METHADONE      300 ng/mL  OPIATES      300 ng/mL  PHENCYCLIDINE     25 ng/mL  THC       50 ng/mL  OXYCODONE      100 ng/mL   POCT ALCOHOL BREATH TEST - Normal    EXTBreath Alcohol 0.000   Final       Patient is medically cleared, with plan for 303 hearing on 8/11/2023. No acute events during shift. Patient is to be signed out to my colleague at change of shift, with plan for 303 hearing.                                Procedures  MDM        Disposition  Final diagnoses:   Agitation requiring sedation protocol   Bizarre behavior     Time reflects when diagnosis was documented in both MDM as applicable and the Disposition within this note     Time User Action Codes Description Comment    8/9/2023 10:11 PM Gianna Noel Add [R45.1] Agitation     8/9/2023 10:11 PM Gianna Noel Add [R46.2] Bizarre behavior     8/9/2023 11:38 PM Gianna Noel Modify [R46.2] Bizarre behavior     8/9/2023 11:38 PM Dario Dallas Remove [R45.1] Agitation     8/9/2023 11:38 PM Dario Dallas Remove [R46.2] Bizarre behavior     8/9/2023 11:38 PM Dario Dallas Add [R45.1] Agitation requiring sedation protocol     8/9/2023 11:38 PM Dario Dallas Add [R46.2] Bizarre behavior       ED Disposition     ED Disposition   Transfer to [address] [place] [address]   --    Date/Time   Wed Aug 9, 2023 10:13 PM    Comment   Raeann Johnson should be transferred to behavioral health, and has been medically cleared. MD Documentation    Ledon Part Most Recent Value   Sending MD Dr. Tani Wilkins    None       Patient's Medications   Discharge Prescriptions    No medications on file     No discharge procedures on file.        ED Provider  Electronically Signed by     Kimani Paulino MD  08/10/23 Skyler Rodriguez MD  08/11/23 9080

## 2023-08-10 NOTE — ED NOTES
Pt demanding food, snacks, and soap and water for a shower. Pt redirected that he was given multiple snacks and a frozen dinner. Pt was also told that he will not be able to shower until the morning.       Criss Ascencio  08/10/23 7224

## 2023-08-10 NOTE — ED NOTES
Per Thierry Contreras, [facility]; , Criss bateman scheduled for 8/11/23. Time will be around 0815. Call-in # [phone], PIN: [phone]  Sonali Rodriguez will be calling in to speak with the patient. The following were advised of the hearing:  Dr. Casas  (upheld [id number] left with 302 petitioner, Dickson Woodson at [phone]. Notified Thierry Contreras of the same - she will follow up with the petitioner.

## 2023-08-10 NOTE — ED CARE HANDOFF
Emergency Department Sign Out Note        Sign out and transfer of care from Dr. Mireya Naik. See Separate Emergency Department note. The patient, Doron Reina, was evaluated by the previous provider for bizarre behavior. Workup Completed:  Labs Reviewed   RAPID DRUG SCREEN, URINE - Abnormal       Result Value Ref Range Status    Amph/Meth UR Negative  Negative Final    Barbiturate Ur Negative  Negative Final    Benzodiazepine Urine Negative  Negative Final    Cocaine Urine Negative  Negative Final    Methadone Urine Negative  Negative Final    Opiate Urine Negative  Negative Final    PCP Ur Negative  Negative Final    THC Urine Positive (*) Negative Final    Oxycodone Urine Negative  Negative Final    Narrative:     Presumptive report. If requested, specimen will be sent to reference lab for confirmation. FOR MEDICAL PURPOSES ONLY. IF CONFIRMATION NEEDED PLEASE CONTACT THE LAB WITHIN 5 DAYS. Drug Screen Cutoff Levels:  AMPHETAMINE/METHAMPHETAMINES  1000 ng/mL  BARBITURATES     200 ng/mL  BENZODIAZEPINES     200 ng/mL  COCAINE      300 ng/mL  METHADONE      300 ng/mL  OPIATES      300 ng/mL  PHENCYCLIDINE     25 ng/mL  THC       50 ng/mL  OXYCODONE      100 ng/mL   POCT ALCOHOL BREATH TEST - Normal    EXTBreath Alcohol 0.000   Final   MEDICAL ALCOHOL         ED Course / Workup Pending (followup): This is a 41years old brought by EMS as family stated that he has bizarre behavior. Patient was walking in the street with a fishing pole, and he was talking to a fish. Patient does not take his medication. Patient was agitated and will have to chemically sedating him patient received Haldol and Ativan. Patient is waiting for crisis evaluation.                                    Procedures  MDM        Disposition  Final diagnoses:   Agitation requiring sedation protocol   Bizarre behavior     Time reflects when diagnosis was documented in both MDM as applicable and the Disposition within this note     Time User Action Codes Description Comment    8/9/2023 10:11 PM Jeroinmo Bellis Add [R45.1] Agitation     8/9/2023 10:11 PM Jeronimo Bellis Add [R46.2] Bizarre behavior     8/9/2023 11:38 PM Jeronimo Bellis Modify [R46.2] Bizarre behavior     8/9/2023 11:38 PM Jeronimo Bellis Remove [R45.1] Agitation     8/9/2023 11:38 PM Jeronimo Bellis Remove [R46.2] Bizarre behavior     8/9/2023 11:38 PM Jeronimo Bellis Add [R45.1] Agitation requiring sedation protocol     8/9/2023 11:38 PM Jeronimo Bellis Add [R46.2] Bizarre behavior       ED Disposition     ED Disposition   Transfer to [address] [place] [address]   --    Date/Time   Wed Aug 9, 2023 10:13 PM    Comment   [facility] Staff should be transferred to behavioral health, and has been medically cleared. Follow-up Information    None       Patient's Medications   Discharge Prescriptions    No medications on file     No discharge procedures on file.        ED Provider  Electronically Signed by     Ciara Samuel MD  08/13/23 2849

## 2023-08-10 NOTE — ED NOTES
Pt woke up demanding soap and water. Pt was reminded again that he may shower later in the morning, and that the rules have not changed. Pt demanded 2 water bottles, and was reminded to speak to staff respectfully, as the staff is treating him respectfully. Pt responded appropriately, then addressed staff with respect.  Pt was given tyler crackers to hold him over until breakfast.     Alba Ascencio, RN  08/10/23 696 [facility], RN  08/10/23 0599

## 2023-08-10 NOTE — ED NOTES
Psychiatry consult ordered. Dr. Alexi Waller was notified of the need for patient to be evaluated for 303 via Tiger text. He acknowledged, will shortly be coming in to evaluate.
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
## 2023-08-11 ENCOUNTER — HOSPITAL ENCOUNTER (INPATIENT)
Facility: HOSPITAL | Age: 48
LOS: 11 days | Discharge: HOME | DRG: 750 | End: 2023-08-22
Attending: STUDENT IN AN ORGANIZED HEALTH CARE EDUCATION/TRAINING PROGRAM | Admitting: STUDENT IN AN ORGANIZED HEALTH CARE EDUCATION/TRAINING PROGRAM
Payer: COMMERCIAL

## 2023-08-11 VITALS
SYSTOLIC BLOOD PRESSURE: 164 MMHG | TEMPERATURE: 98.3 F | RESPIRATION RATE: 16 BRPM | DIASTOLIC BLOOD PRESSURE: 97 MMHG | HEART RATE: 86 BPM | OXYGEN SATURATION: 99 %

## 2023-08-11 DIAGNOSIS — Z00.8 MEDICAL CLEARANCE FOR PSYCHIATRIC ADMISSION: ICD-10-CM

## 2023-08-11 DIAGNOSIS — L84 CALLUS OF FOOT: Primary | ICD-10-CM

## 2023-08-11 DIAGNOSIS — I10 HYPERTENSION, UNSPECIFIED TYPE: Chronic | ICD-10-CM

## 2023-08-11 DIAGNOSIS — F25.0 SCHIZOAFFECTIVE DISORDER, BIPOLAR TYPE WITH GOOD PROGNOSTIC FEATURES (HCC): ICD-10-CM

## 2023-08-11 PROCEDURE — 99214 OFFICE O/P EST MOD 30 MIN: CPT | Performed by: PSYCHIATRY & NEUROLOGY

## 2023-08-11 RX ORDER — OLANZAPINE 10 MG/1
10 TABLET ORAL
Status: DISCONTINUED | OUTPATIENT
Start: 2023-08-11 | End: 2023-08-22 | Stop reason: HOSPADM

## 2023-08-11 RX ORDER — OLANZAPINE 10 MG/1
10 INJECTION, POWDER, LYOPHILIZED, FOR SOLUTION INTRAMUSCULAR
Status: DISCONTINUED | OUTPATIENT
Start: 2023-08-11 | End: 2023-08-22 | Stop reason: HOSPADM

## 2023-08-11 RX ORDER — OLANZAPINE 5 MG/1
5 TABLET ORAL
Status: DISCONTINUED | OUTPATIENT
Start: 2023-08-11 | End: 2023-08-22 | Stop reason: HOSPADM

## 2023-08-11 RX ORDER — OLANZAPINE 10 MG/1
5 INJECTION, POWDER, LYOPHILIZED, FOR SOLUTION INTRAMUSCULAR
Status: CANCELLED | OUTPATIENT
Start: 2023-08-11

## 2023-08-11 RX ORDER — HYDROXYZINE HYDROCHLORIDE 25 MG/1
25 TABLET, FILM COATED ORAL
Status: DISCONTINUED | OUTPATIENT
Start: 2023-08-11 | End: 2023-08-22 | Stop reason: HOSPADM

## 2023-08-11 RX ORDER — HYDROXYZINE HYDROCHLORIDE 25 MG/1
25 TABLET, FILM COATED ORAL
Status: CANCELLED | OUTPATIENT
Start: 2023-08-11

## 2023-08-11 RX ORDER — TRAZODONE HYDROCHLORIDE 50 MG/1
50 TABLET ORAL
Status: DISCONTINUED | OUTPATIENT
Start: 2023-08-11 | End: 2023-08-22 | Stop reason: HOSPADM

## 2023-08-11 RX ORDER — HALOPERIDOL 5 MG/ML
2 INJECTION INTRAMUSCULAR ONCE
Status: DISCONTINUED | OUTPATIENT
Start: 2023-08-11 | End: 2023-08-11

## 2023-08-11 RX ORDER — HYDROXYZINE 50 MG/1
50 TABLET, FILM COATED ORAL
Status: DISCONTINUED | OUTPATIENT
Start: 2023-08-11 | End: 2023-08-22 | Stop reason: HOSPADM

## 2023-08-11 RX ORDER — LORAZEPAM 2 MG/ML
2 INJECTION INTRAMUSCULAR EVERY 6 HOURS PRN
Status: DISCONTINUED | OUTPATIENT
Start: 2023-08-11 | End: 2023-08-22 | Stop reason: HOSPADM

## 2023-08-11 RX ORDER — AMOXICILLIN 250 MG
1 CAPSULE ORAL DAILY PRN
Status: CANCELLED | OUTPATIENT
Start: 2023-08-11

## 2023-08-11 RX ORDER — MAGNESIUM HYDROXIDE/ALUMINUM HYDROXICE/SIMETHICONE 120; 1200; 1200 MG/30ML; MG/30ML; MG/30ML
30 SUSPENSION ORAL EVERY 4 HOURS PRN
Status: DISCONTINUED | OUTPATIENT
Start: 2023-08-11 | End: 2023-08-22 | Stop reason: HOSPADM

## 2023-08-11 RX ORDER — OLANZAPINE 10 MG/1
10 TABLET ORAL
Status: CANCELLED | OUTPATIENT
Start: 2023-08-11

## 2023-08-11 RX ORDER — HYDROXYZINE HYDROCHLORIDE 25 MG/1
50 TABLET, FILM COATED ORAL
Status: CANCELLED | OUTPATIENT
Start: 2023-08-11

## 2023-08-11 RX ORDER — ACETAMINOPHEN 325 MG/1
975 TABLET ORAL EVERY 6 HOURS PRN
Status: CANCELLED | OUTPATIENT
Start: 2023-08-11

## 2023-08-11 RX ORDER — HYDROXYZINE HYDROCHLORIDE 25 MG/1
100 TABLET, FILM COATED ORAL
Status: CANCELLED | OUTPATIENT
Start: 2023-08-11

## 2023-08-11 RX ORDER — MAGNESIUM HYDROXIDE/ALUMINUM HYDROXICE/SIMETHICONE 120; 1200; 1200 MG/30ML; MG/30ML; MG/30ML
30 SUSPENSION ORAL EVERY 4 HOURS PRN
Status: CANCELLED | OUTPATIENT
Start: 2023-08-11

## 2023-08-11 RX ORDER — HYDROXYZINE 50 MG/1
100 TABLET, FILM COATED ORAL
Status: DISCONTINUED | OUTPATIENT
Start: 2023-08-11 | End: 2023-08-22 | Stop reason: HOSPADM

## 2023-08-11 RX ORDER — OLANZAPINE 2.5 MG/1
2.5 TABLET ORAL
Status: DISCONTINUED | OUTPATIENT
Start: 2023-08-11 | End: 2023-08-22 | Stop reason: HOSPADM

## 2023-08-11 RX ORDER — BENZTROPINE MESYLATE 1 MG/ML
1 INJECTION INTRAMUSCULAR; INTRAVENOUS
Status: CANCELLED | OUTPATIENT
Start: 2023-08-11

## 2023-08-11 RX ORDER — OLANZAPINE 2.5 MG/1
5 TABLET ORAL
Status: CANCELLED | OUTPATIENT
Start: 2023-08-11

## 2023-08-11 RX ORDER — BENZTROPINE MESYLATE 1 MG/1
1 TABLET ORAL
Status: CANCELLED | OUTPATIENT
Start: 2023-08-11

## 2023-08-11 RX ORDER — PROPRANOLOL HYDROCHLORIDE 10 MG/1
10 TABLET ORAL EVERY 8 HOURS PRN
Status: DISCONTINUED | OUTPATIENT
Start: 2023-08-11 | End: 2023-08-22 | Stop reason: HOSPADM

## 2023-08-11 RX ORDER — POLYETHYLENE GLYCOL 3350 17 G/17G
17 POWDER, FOR SOLUTION ORAL DAILY PRN
Status: DISCONTINUED | OUTPATIENT
Start: 2023-08-11 | End: 2023-08-22 | Stop reason: HOSPADM

## 2023-08-11 RX ORDER — OLANZAPINE 10 MG/1
5 INJECTION, POWDER, LYOPHILIZED, FOR SOLUTION INTRAMUSCULAR
Status: DISCONTINUED | OUTPATIENT
Start: 2023-08-11 | End: 2023-08-22 | Stop reason: HOSPADM

## 2023-08-11 RX ORDER — TRAZODONE HYDROCHLORIDE 50 MG/1
50 TABLET ORAL
Status: CANCELLED | OUTPATIENT
Start: 2023-08-11

## 2023-08-11 RX ORDER — PROPRANOLOL HYDROCHLORIDE 20 MG/1
10 TABLET ORAL EVERY 8 HOURS PRN
Status: CANCELLED | OUTPATIENT
Start: 2023-08-11

## 2023-08-11 RX ORDER — DIPHENHYDRAMINE HYDROCHLORIDE 50 MG/ML
50 INJECTION INTRAMUSCULAR; INTRAVENOUS EVERY 6 HOURS PRN
Status: DISCONTINUED | OUTPATIENT
Start: 2023-08-11 | End: 2023-08-22 | Stop reason: HOSPADM

## 2023-08-11 RX ORDER — ACETAMINOPHEN 325 MG/1
650 TABLET ORAL EVERY 4 HOURS PRN
Status: CANCELLED | OUTPATIENT
Start: 2023-08-11

## 2023-08-11 RX ORDER — ACETAMINOPHEN 325 MG/1
650 TABLET ORAL EVERY 4 HOURS PRN
Status: DISCONTINUED | OUTPATIENT
Start: 2023-08-11 | End: 2023-08-16

## 2023-08-11 RX ORDER — LORAZEPAM 2 MG/ML
2 INJECTION INTRAMUSCULAR EVERY 6 HOURS PRN
Status: CANCELLED | OUTPATIENT
Start: 2023-08-11

## 2023-08-11 RX ORDER — POLYETHYLENE GLYCOL 3350 17 G/17G
17 POWDER, FOR SOLUTION ORAL DAILY PRN
Status: CANCELLED | OUTPATIENT
Start: 2023-08-11

## 2023-08-11 RX ORDER — BENZTROPINE MESYLATE 1 MG/1
1 TABLET ORAL
Status: DISCONTINUED | OUTPATIENT
Start: 2023-08-11 | End: 2023-08-22 | Stop reason: HOSPADM

## 2023-08-11 RX ORDER — OLANZAPINE 2.5 MG/1
2.5 TABLET ORAL
Status: CANCELLED | OUTPATIENT
Start: 2023-08-11

## 2023-08-11 RX ORDER — OLANZAPINE 10 MG/1
10 INJECTION, POWDER, LYOPHILIZED, FOR SOLUTION INTRAMUSCULAR
Status: CANCELLED | OUTPATIENT
Start: 2023-08-11

## 2023-08-11 RX ORDER — AMOXICILLIN 250 MG
1 CAPSULE ORAL DAILY PRN
Status: DISCONTINUED | OUTPATIENT
Start: 2023-08-11 | End: 2023-08-22 | Stop reason: HOSPADM

## 2023-08-11 RX ORDER — ACETAMINOPHEN 325 MG/1
650 TABLET ORAL EVERY 6 HOURS PRN
Status: CANCELLED | OUTPATIENT
Start: 2023-08-11

## 2023-08-11 RX ORDER — BENZTROPINE MESYLATE 1 MG/ML
1 INJECTION INTRAMUSCULAR; INTRAVENOUS
Status: DISCONTINUED | OUTPATIENT
Start: 2023-08-11 | End: 2023-08-22 | Stop reason: HOSPADM

## 2023-08-11 RX ORDER — ACETAMINOPHEN 325 MG/1
650 TABLET ORAL EVERY 6 HOURS PRN
Status: DISCONTINUED | OUTPATIENT
Start: 2023-08-11 | End: 2023-08-22 | Stop reason: HOSPADM

## 2023-08-11 RX ORDER — DIPHENHYDRAMINE HYDROCHLORIDE 50 MG/ML
50 INJECTION INTRAMUSCULAR; INTRAVENOUS EVERY 6 HOURS PRN
Status: CANCELLED | OUTPATIENT
Start: 2023-08-11

## 2023-08-11 RX ORDER — ACETAMINOPHEN 325 MG/1
975 TABLET ORAL EVERY 6 HOURS PRN
Status: DISCONTINUED | OUTPATIENT
Start: 2023-08-11 | End: 2023-08-16

## 2023-08-11 RX ADMIN — OLANZAPINE 10 MG: 10 TABLET, FILM COATED ORAL at 23:32

## 2023-08-11 RX ADMIN — PROPRANOLOL HYDROCHLORIDE 10 MG: 10 TABLET ORAL at 23:32

## 2023-08-11 NOTE — ED NOTES
No update in regard to 302 petitionsita Carmen Duckworth returning phone calls from [facility] or [facility] to confirm attending of the 303 hearing. Phone call placed to Carmen kenny at [phone] - left voicemail with hearing details. Phone call placed to Hyun Santos[address] to verify if she was able to reach 302 petitioner to advise them of 303 hearing. She attempted to contact him yesterday though he did not answer the call. She will attempt again this morning and update this writer.

## 2023-08-11 NOTE — PROGRESS NOTES
Progress Note - [address] 41 y.o. male MRN: [id number]  Unit/Bed#: [address] Encounter: [id number]        This morning I called into patient's 303 hearing. Was told that was postponed until 8/14/23 at 8 am. I came to see the patient for continuity of care. Patient was guarded and minimally cooperative with interview today. He is irritable. At one point he appeared to be escalating but redirected himself. He denies depression. Denies suicidal or homicidal ideation, plan, or intent. He continues to be disorganized, tangential. He denies having complaints. He reports that he wants to go home. He went to the bathroom twice over the course of the interview and during the second time was heard flushing the toilet and running the sink water multiple times.        Behavior over the last 24 hours:  unchanged  Sleep: unable to assess due to lack of cooperation  Appetite: unable to assess due to lack of cooperation  Medication side effects: None reported  ROS: no complaints and all other systems are negative    Mental Status Evaluation:  Appearance:  age appropriate, tattooed and in paper scrubs   Behavior:  guarded and minimally cooperative   Speech:  scant   Mood:  "fine"   Affect:  Irritable   Language: Within normal limits   Thought Process:  disorganized, tangential    Associations: tangential associations   Thought Content:  no delusions elicited   Perceptual Disturbances: unable to assess due to lack of cooepration    Risk Potential: Suicidal Ideations none, Homicidal Ideations none and Potential for Aggression Yes appeared to be escalating at one point   Sensorium:  person and place   Memory:  Unable to assess due to lack of cooperation   Cognition:  Unable to assess due to lack of cooperation   Consciousness:  alert and awake    Attention: attention span appeared shorter than expected for age   Intellect: within normal limits   Fund of Knowledge: awareness of current events: Unable to assess due to lack of cooperation, past history: Unable to assess due to lack of cooperation and vocabulary: Unable to assess due to lack of cooperation   Insight:  poor   Judgment: poor   Muscle Strength and Tone: Not assessed   Gait/Station: normal gait/station   Motor Activity: no abnormal movements         Assessment/Plan  Michelle Reddy is a 41 y.o. male with past medical history of schizoaffective disorder bipolar type who presented to the ED by EMS for agitation and bizarre behavior. Patient was evaluated yesterday and 303 paperwork was completed. 303 hearing from this morning is postponed until Monday morning 8/14/23 at 8 am. Patient was guarded and minimally cooperative with interview today. He is irritable. At one point he appeared to be escalating but redirected himself. He denies depression. Denies suicidal or homicidal ideation, plan, or intent. He continues to be disorganized, tangential. He denies having complaints. He reports that he wants to go home. He went to the bathroom twice over the course of the interview and during the second time was heard flushing the toilet and running the sink water multiple times. Patient continues to require inpatient psychiatric hospitalization. Diagnosis:  Schizoaffective disorder, bipolar type    Recommended Treatment:   Continue one-to-one observation  Patient continues to require inpatient psychiatric hospitalization  Patient is currently under a 302 involuntary psychaitric comittment  303 paperwork completed, hearing was for this morning but is postponed until Monday morning 8/14/2023 at 8 AM  Patient has been accepted to [address], will defer starting psychiatric medication to inpatient psychiatric team  Discussed with the ED physician    Medications: all current active meds have been reviewed      Risks, benefits and possible side effects of Medications:     Unable to have discussion due to lack of cooperation.   No medications recommended by me at this time    Labs: I have personally reviewed all pertinent laboratory results.        Margret Valdes, DO

## 2023-08-11 NOTE — ED NOTES
303 hearing has been delayed to 0800 Monday 8/14 due to 302 petitioner not able to be reached by the [facility] or [facility].  is requesting that Dr. Will John attend; he upheld 36 - message sent viaTiger Text with hearing details. Intake updated. Update: [facility] was able to reach the 302 petitioner. Told be on stand bye for hearing, will possibly be some time later this morning. Awaiting follow up from [facility].

## 2023-08-11 NOTE — EMTALA/ACUTE CARE TRANSFER
[facility] EMERGENCY DEPARTMENT  [address] [facility] [address]  Dept: [phone]      EMTALA TRANSFER CONSENT    NAME Erica Barba                                         1975                              MRN [id number]    I have been informed of my rights regarding examination, treatment, and transfer   by Dr. Shun Parra MD    Benefits: Specialized equipment and/or services available at the receiving facility (Include comment)________________________    Risks: Potential for delay in receiving treatment, Potential deterioration of medical condition, Increased discomfort during transfer, Possible worsening of condition or death during transfer      Transfer Request   I acknowledge that my medical condition has been evaluated and explained to me by the emergency department physician or other qualified medical person and/or my attending physician who has recommended and offered to me further medical examination and treatment. I understand the Hospital's obligation with respect to the treatment and stabilization of my emergency medical condition. I nevertheless request to be transferred. I release the Hospital, the doctor, and any other persons caring for me from all responsibility or liability for any injury or ill effects that may result from my transfer and agree to accept all responsibility for the consequences of my choice to transfer, rather than receive stabilizing treatment at the Hospital. I understand that because the transfer is my request, my insurance may not provide reimbursement for the services. The Hospital will assist and direct me and my family in how to make arrangements for transfer, but the hospital is not liable for any fees charged by the transport service.   In spite of this understanding, I refuse to consent to further medical examination and treatment which has been offered to me, and request transfer to [address] [place] [address] [address] Name, [facility] : [address] [place] [address]. I authorize the performance of emergency medical procedures and treatments upon me in both transit and upon arrival at the receiving facility. Additionally, I authorize the release of any and all medical records to the receiving facility and request they be transported with me, if possible. I authorize the performance of emergency medical procedures and treatments upon me in both transit and upon arrival at the receiving facility. Additionally, I authorize the release of any and all medical records to the receiving facility and request they be transported with me, if possible. I understand that the safest mode of transportation during a medical emergency is an ambulance and that the Hospital advocates the use of this mode of transport. Risks of traveling to the receiving facility by car, including absence of medical control, life sustaining equipment, such as oxygen, and medical personnel has been explained to me and I fully understand them. (YESSI CORRECT BOX BELOW)  [  ]  I consent to the stated transfer and to be transported by ambulance/helicopter. [  ]  I consent to the stated transfer, but refuse transportation by ambulance and accept full responsibility for my transportation by car. I understand the risks of non-ambulance transfers and I exonerate the Hospital and its staff from any deterioration in my condition that results from this refusal.    X___________________________________________    DATE  23  TIME________  Signature of patient or legally responsible individual signing on patient behalf           RELATIONSHIP TO PATIENT_________________________          Provider Certification    NAME Kristen Mena                                        [facility] 1975                              MRN [id number]    A medical screening exam was performed on the above named patient.   Based on the examination:    Condition Necessitating Transfer The primary encounter diagnosis was Agitation requiring sedation protocol. Diagnoses of Bizarre behavior and Medical clearance for psychiatric admission were also pertinent to this visit. Patient Condition: The patient has been stabilized such that within reasonable medical probability, no material deterioration of the patient condition or the condition of the unborn child(rey) is likely to result from the transfer    Reason for Transfer: Level of Care needed not available at this facility    Transfer Requirements: Facility [address]   · Space available and qualified personnel available for treatment as acknowledged by    · Agreed to accept transfer and to provide appropriate medical treatment as acknowledged by       dr Tavon Horta  · Appropriate medical records of the examination and treatment of the patient are provided at the time of transfer   [address] _______  · Transfer will be performed by qualified personnel from    and appropriate transfer equipment as required, including the use of necessary and appropriate life support measures.     Provider Certification: I have examined the patient and explained the following risks and benefits of being transferred/refusing transfer to the patient/family:  General risk, such as traffic hazards, adverse weather conditions, rough terrain or turbulence, possible failure of equipment (including vehicle or aircraft), or consequences of actions of persons outside the control of the transport personnel, Unanticipated needs of medical equipment and personnel during transport, Risk of worsening condition, The possibility of a transport vehicle being unavailable      Based on these reasonable risks and benefits to the patient and/or the unborn child(rey), and based upon the information available at the time of the patient’s examination, I certify that the medical benefits reasonably to be expected from the provision of appropriate medical treatments at another medical facility outweigh the increasing risks, if any, to the individual’s medical condition, and in the case of labor to the unborn child, from effecting the transfer.     X____________________________________________ DATE 08/11/23        TIME_______      ORIGINAL - SEND TO MEDICAL RECORDS   COPY - SEND WITH PATIENT DURING TRANSFER

## 2023-08-11 NOTE — ED CARE HANDOFF
Emergency Department Sign Out Note        Sign out and transfer of care from dr Tasha Choudhury Emergency Department note. The patient, Padmini Gutierrez, was evaluated by the previous provider for bizzar behavior    Workup Completed:  Medically clear for psych evaluation. ED Course / Workup Pending (followup): Patient has no disruptive behavior overnight. Patient keeps talking all the time nonstop at the ER. Patient has no agitation. Patient waiting for disposition according to the psychiatry. Pt accepted to [facility], dr Jyoti Escamilla accepted. Procedures  MDM        Disposition  Final diagnoses:   Agitation requiring sedation protocol   Bizarre behavior     Time reflects when diagnosis was documented in both MDM as applicable and the Disposition within this note     Time User Action Codes Description Comment    8/9/2023 10:11 PM Lajuanda Rinne Add [R45.1] Agitation     8/9/2023 10:11 PM Lajuanda Rinne Add [R46.2] Bizarre behavior     8/9/2023 11:38 PM Lajuanda Rinne Modify [R46.2] Bizarre behavior     8/9/2023 11:38 PM Lajuanda Rinne Remove [R45.1] Agitation     8/9/2023 11:38 PM Lajuanda Rinne Remove [R46.2] Bizarre behavior     8/9/2023 11:38 PM Lajuanda Rinne Add [R45.1] Agitation requiring sedation protocol     8/9/2023 11:38 PM Lajuanda Rinne Add [R46.2] Bizarre behavior       ED Disposition     ED Disposition   Transfer to [address] [place] [address]   --    Date/Time   Wed Aug 9, 2023 10:13 PM    Comment   Padmini Gutierrez should be transferred to behavioral health, and has been medically cleared. MD Documentation    Nobie Pretty Most Recent Value   Sending MD Dr. Flaca Dinh    None       Patient's Medications   Discharge Prescriptions    No medications on file     No discharge procedures on file.        ED Provider  Electronically Signed by     Michell Hartman MD  08/13/23 1415

## 2023-08-11 NOTE — ED NOTES
Patient is accepted at [facility]  Patient is accepted by Dr. David Fitzgerald per Blanca. Transportation is arranged with [employer]. Transportation is scheduled for 2030. Patient may go to the floor after 20:00      Nurse report is to be called to [phone] prior to patient transfer.

## 2023-08-11 NOTE — ED NOTES
Pt presented for 24 month follow-up and verbalized consent and willingness to continue to participate with the [employer] registry.      Patient completed the EQ-5D quality of life questionnaire, KCCQ, and the Trail Making neurocognitive test in 104 seconds.       Pt returned to roomloni at bedside for 1:1.       Daryrl Espinoza  08/11/23 [id number]

## 2023-08-11 NOTE — ED NOTES
303 hearing update:  Spoke with Hemant Philip, [facility] mental health reviewer, reporting that hearing will be held at 0800 on Monday 8/14/23. Call-in # [phone], PIN: [phone] Alerted Dr.s Dale Hale (upheld 302) and Cindy (upheld 303) of the same. [place] had spoken with the 302 petitioner and confirmed their attendance as well. Intake updated.

## 2023-08-11 NOTE — ED NOTES
Assumed care of patient. Patient laying in bed watching TV. Respirations even and unlabored. No acute distress noted. 1:1 continued.       Beth Zheng RN  08/11/23 1941

## 2023-08-11 NOTE — ED NOTES
Intake verified having the 302 and are aware of the 303 hearing. Network bed availability to be determined. Out of network referrals deferred until hearing is completed.

## 2023-08-12 PROBLEM — Z91.148 H/O MEDICATION NONCOMPLIANCE: Status: ACTIVE | Noted: 2023-08-12

## 2023-08-12 PROBLEM — Z00.8 MEDICAL CLEARANCE FOR PSYCHIATRIC ADMISSION: Status: ACTIVE | Noted: 2023-08-12

## 2023-08-12 LAB
ALBUMIN SERPL BCP-MCNC: 4.8 G/DL (ref 3.5–5)
ALP SERPL-CCNC: 66 U/L (ref 34–104)
ALT SERPL W P-5'-P-CCNC: 54 U/L (ref 7–52)
ANION GAP SERPL CALCULATED.3IONS-SCNC: 7 MMOL/L
AST SERPL W P-5'-P-CCNC: 39 U/L (ref 13–39)
BASOPHILS # BLD AUTO: 0.03 THOUSANDS/ÂΜL (ref 0–0.1)
BASOPHILS NFR BLD AUTO: 0 % (ref 0–1)
BILIRUB SERPL-MCNC: 0.34 MG/DL (ref 0.2–1)
BUN SERPL-MCNC: 11 MG/DL (ref 5–25)
CALCIUM SERPL-MCNC: 10 MG/DL (ref 8.4–10.2)
CHLORIDE SERPL-SCNC: 100 MMOL/L (ref 96–108)
CHOLEST SERPL-MCNC: 142 MG/DL
CO2 SERPL-SCNC: 31 MMOL/L (ref 21–32)
CREAT SERPL-MCNC: 0.76 MG/DL (ref 0.6–1.3)
EOSINOPHIL # BLD AUTO: 0.13 THOUSAND/ÂΜL (ref 0–0.61)
EOSINOPHIL NFR BLD AUTO: 1 % (ref 0–6)
ERYTHROCYTE [DISTWIDTH] IN BLOOD BY AUTOMATED COUNT: 13.1 % (ref 11.6–15.1)
GFR SERPL CREATININE-BSD FRML MDRD: 110 ML/MIN/1.73SQ M
GLUCOSE SERPL-MCNC: 127 MG/DL (ref 65–140)
HCT VFR BLD AUTO: 45.9 % (ref 36.5–49.3)
HDLC SERPL-MCNC: 40 MG/DL
HGB BLD-MCNC: 15.6 G/DL (ref 12–17)
IMM GRANULOCYTES # BLD AUTO: 0.02 THOUSAND/UL (ref 0–0.2)
IMM GRANULOCYTES NFR BLD AUTO: 0 % (ref 0–2)
LDLC SERPL CALC-MCNC: 54 MG/DL (ref 0–100)
LYMPHOCYTES # BLD AUTO: 2.34 THOUSANDS/ÂΜL (ref 0.6–4.47)
LYMPHOCYTES NFR BLD AUTO: 26 % (ref 14–44)
MCH RBC QN AUTO: 31.2 PG (ref 26.8–34.3)
MCHC RBC AUTO-ENTMCNC: 34 G/DL (ref 31.4–37.4)
MCV RBC AUTO: 92 FL (ref 82–98)
MONOCYTES # BLD AUTO: 0.79 THOUSAND/ÂΜL (ref 0.17–1.22)
MONOCYTES NFR BLD AUTO: 9 % (ref 4–12)
NEUTROPHILS # BLD AUTO: 5.83 THOUSANDS/ÂΜL (ref 1.85–7.62)
NEUTS SEG NFR BLD AUTO: 64 % (ref 43–75)
NONHDLC SERPL-MCNC: 102 MG/DL
NRBC BLD AUTO-RTO: 0 /100 WBCS
PLATELET # BLD AUTO: 309 THOUSANDS/UL (ref 149–390)
PMV BLD AUTO: 9.1 FL (ref 8.9–12.7)
POTASSIUM SERPL-SCNC: 3.9 MMOL/L (ref 3.5–5.3)
PROT SERPL-MCNC: 7.4 G/DL (ref 6.4–8.4)
RBC # BLD AUTO: 5 MILLION/UL (ref 3.88–5.62)
SODIUM SERPL-SCNC: 138 MMOL/L (ref 135–147)
TRIGL SERPL-MCNC: 240 MG/DL
TSH SERPL DL<=0.05 MIU/L-ACNC: 1.08 UIU/ML (ref 0.45–4.5)
WBC # BLD AUTO: 9.14 THOUSAND/UL (ref 4.31–10.16)

## 2023-08-12 PROCEDURE — 80061 LIPID PANEL: CPT | Performed by: STUDENT IN AN ORGANIZED HEALTH CARE EDUCATION/TRAINING PROGRAM

## 2023-08-12 PROCEDURE — 82746 ASSAY OF FOLIC ACID SERUM: CPT | Performed by: STUDENT IN AN ORGANIZED HEALTH CARE EDUCATION/TRAINING PROGRAM

## 2023-08-12 PROCEDURE — 80053 COMPREHEN METABOLIC PANEL: CPT | Performed by: STUDENT IN AN ORGANIZED HEALTH CARE EDUCATION/TRAINING PROGRAM

## 2023-08-12 PROCEDURE — 84443 ASSAY THYROID STIM HORMONE: CPT | Performed by: STUDENT IN AN ORGANIZED HEALTH CARE EDUCATION/TRAINING PROGRAM

## 2023-08-12 PROCEDURE — 85025 COMPLETE CBC W/AUTO DIFF WBC: CPT | Performed by: STUDENT IN AN ORGANIZED HEALTH CARE EDUCATION/TRAINING PROGRAM

## 2023-08-12 PROCEDURE — 82607 VITAMIN B-12: CPT | Performed by: STUDENT IN AN ORGANIZED HEALTH CARE EDUCATION/TRAINING PROGRAM

## 2023-08-12 PROCEDURE — 99253 IP/OBS CNSLTJ NEW/EST LOW 45: CPT | Performed by: NURSE PRACTITIONER

## 2023-08-12 PROCEDURE — 99223 1ST HOSP IP/OBS HIGH 75: CPT | Performed by: STUDENT IN AN ORGANIZED HEALTH CARE EDUCATION/TRAINING PROGRAM

## 2023-08-12 PROCEDURE — 82306 VITAMIN D 25 HYDROXY: CPT | Performed by: STUDENT IN AN ORGANIZED HEALTH CARE EDUCATION/TRAINING PROGRAM

## 2023-08-12 PROCEDURE — 86780 TREPONEMA PALLIDUM: CPT | Performed by: STUDENT IN AN ORGANIZED HEALTH CARE EDUCATION/TRAINING PROGRAM

## 2023-08-12 RX ORDER — RISPERIDONE 2 MG/1
2 TABLET ORAL
Status: DISCONTINUED | OUTPATIENT
Start: 2023-08-12 | End: 2023-08-14

## 2023-08-12 RX ORDER — CLONIDINE HYDROCHLORIDE 0.1 MG/1
0.2 TABLET ORAL ONCE
Status: COMPLETED | OUTPATIENT
Start: 2023-08-12 | End: 2023-08-12

## 2023-08-12 RX ADMIN — RISPERIDONE 2 MG: 2 TABLET ORAL at 21:13

## 2023-08-12 RX ADMIN — CLONIDINE HYDROCHLORIDE 0.2 MG: 0.1 TABLET ORAL at 17:47

## 2023-08-12 NOTE — NURSING NOTE
Pt is calm with an irritable edge, scant in speech with intense handshake and eye contact. Affect constricted. Pt is assertive when making needs known. Denies SI/HI/AVH, depression, or anxiety. Shaved this morning, phone obtained to take numbers out as requested. No unmet needs at this time.
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
## 2023-08-12 NOTE — ASSESSMENT & PLAN NOTE
· Patient has a longstanding history greater than 1 year marijuana abuse  · Patient's urine toxicology THC positive  · Marijuana cessation education

## 2023-08-12 NOTE — PLAN OF CARE
Problem: Alteration in Thoughts and Perception  Goal: Treatment Goal: Gain control of psychotic behaviors/thinking, reduce/eliminate presenting symptoms and demonstrate improved reality functioning upon discharge  Outcome: Not Progressing  Goal: Verbalize thoughts and feelings  Description: Interventions:  - Promote a nonjudgmental and trusting relationship with the patient through active listening and therapeutic communication  - Assess patient's level of functioning, behavior and potential for risk  - Engage patient in 1 on 1 interactions  - Encourage patient to express fears, feelings, frustrations, and discuss symptoms    - [place] patient to reality, help patient recognize reality-based thinking   - Administer medications as ordered and assess for potential side effects  - Provide the patient education related to the signs and symptoms of the illness and desired effects of prescribed medications  Outcome: Not Progressing  Goal: Refrain from acting on delusional thinking/internal stimuli  Description: Interventions:  - Monitor patient closely, per order   - Utilize least restrictive measures   - Set reasonable limits, give positive feedback for acceptable   - Administer medications as ordered and monitor of potential side effects  Outcome: Not Progressing  Goal: Agree to be compliant with medication regime, as prescribed and report medication side effects  Description: Interventions:  - Offer appropriate PRN medication and supervise ingestion; conduct AIMS, as needed   Outcome: Not Progressing  Goal: Attend and participate in unit activities, including therapeutic, recreational, and educational groups  Description: Interventions:  -Encourage Visitation and family involvement in care  Outcome: Not Progressing  Goal: Recognize dysfunctional thoughts, communicate reality-based thoughts at the time of discharge  Description: Interventions:  - Provide medication and psycho-education to assist patient in compliance and developing insight into his/her illness   Outcome: Not Progressing  Goal: Complete daily ADLs, including personal hygiene independently, as able  Description: Interventions:  - Observe, teach, and assist patient with ADLS  - Monitor and promote a balance of rest/activity, with adequate nutrition and elimination   Outcome: Not Progressing

## 2023-08-12 NOTE — TREATMENT PLAN
TREATMENT PLAN REVIEW - [address] 41 y.o. 1975 male MRN: [id number]    [address] Room / Bed: Lenora Cooper [address] [place] [address] [id number] Encounter: [id number]          Admit Date/Time:  8/11/2023  9:15 PM    Treatment Team: Attending Provider: Brigette Estrada MD; Patient Care Assistant: Mariana Brannon; Registered Nurse: Elaine Kehr, RN; Patient Care Technician: Matteo Client; Licensed Practical Nurse: Kimberlyn Shen LPN    Diagnosis: Principal Problem:    Schizoaffective disorder, bipolar type with good prognostic features ([address])  Active Problems:    Alcohol abuse, continuous    Cannabis dependence, continuous ([address])    Medical clearance for psychiatric admission    H/O medication noncompliance      Patient Strengths/Assets: ability for insight, cooperative, good past treatment response, negotiates basic needs, supportive family/friends    Patient Barriers/Limitations: difficulty adapting, lack of stable employment, limited motivation, marital/family conflict, noncompliant with treatment, patient is on an involuntary commitment, poor insight, poor interpersonal skills, poor reasoning ability, poor self-care    Short Term Goals: decrease in paranoid thoughts, decrease in psychotic symptoms, decrease in level of agitation, ability to stay safe on the unit, ability to stay free of restraints, improvement in ability to express basic needs, improvement in insight, improvement in reality testing, improvement in reasoning ability, improvement in self care, sleep improvement, improvement in appetite, mood stabilization, increase in group attendance, increase in socialization with peers on the unit, acceptance of need for psychiatric treatment, acceptance of psychiatric medications    Long Term Goals: stabilization of mood, free of suicidal thoughts, free of homicidal thoughts, no self abusive behavior, resolution of psychotic symptoms, improvement in reality testing, improvement in reasoning ability, improved insight, able to express basic needs, acceptance of need for psychiatric medications, acceptance of need for psychiatric treatment, acceptance of need for psychiatric follow up after discharge, acceptance of psychiatric diagnosis, adequate self care, adequate sleep, adequate appetite, appropriate interaction with peers, appropriate interaction with family, stable living arrangements upon discharge, establishment of family support upon discharge    Progress Towards Goals: starting psychiatric medications as prescribed, still has psychotic symptoms    Recommended Treatment: medication management, patient medication education, group therapy, milieu therapy, continued Behavioral Health psychiatric evaluation/assessment process    Treatment Frequency: daily medication monitoring, group and milieu therapy daily, monitoring through interdisciplinary rounds, monitoring through weekly patient care conferences    Expected Discharge Date:  8/22/23    Discharge Plan: discharge to home, referral for outpatient medication management with a psychiatrist, referral for outpatient psychotherapy    Treatment Plan Created/Updated By: Artur Vu MD

## 2023-08-12 NOTE — ASSESSMENT & PLAN NOTE
· Patient has a longstanding alcohol use greater than 1 year  · Patient's breathalyzer was negative in the ED  · Alcohol cessation education
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
## 2023-08-12 NOTE — NURSING NOTE
Pt paranoid throughout the day reluctant to take blood pressure medication. Asked severeal times if this nurse was a real nurse. Given printout of clonidine upon request. Pt continually questioning actions of this nurse and others. At one point during the evening, pt was agitated and punched the door. Pt refused PRN medication saying he was done with his agitation. Allowed blood work. Pt irritable throughout the day refusing to take any prn meds. Spoke to pt about risperdal and he agreed to take it tonight. Behavior is intimidating by walk close to others at times and demanding things. Pt also upset he could not get his roommate changed as he states there is urine on the toilet. Told pt we could clean this but that no room changes could be made at this time. Pt remarked "who's running this place." No other interventions at this time.

## 2023-08-12 NOTE — ASSESSMENT & PLAN NOTE
· It was reported by his family that patient has been noncompliant with his psych meds over the last 2 weeks  · Educate the patient on the necessity for being compliant with his medications to control his disease process

## 2023-08-12 NOTE — PLAN OF CARE
Problem: Alteration in Thoughts and Perception  Goal: Treatment Goal: Gain control of psychotic behaviors/thinking, reduce/eliminate presenting symptoms and demonstrate improved reality functioning upon discharge  8/12/2023 0539 by Katerin Vargas RN  Outcome: Not Progressing  8/11/2023 2236 by Katerin Vargas RN  Outcome: Not Progressing  Goal: Verbalize thoughts and feelings  Description: Interventions:  - Promote a nonjudgmental and trusting relationship with the patient through active listening and therapeutic communication  - Assess patient's level of functioning, behavior and potential for risk  - Engage patient in 1 on 1 interactions  - Encourage patient to express fears, feelings, frustrations, and discuss symptoms    - [place] patient to reality, help patient recognize reality-based thinking   - Administer medications as ordered and assess for potential side effects  - Provide the patient education related to the signs and symptoms of the illness and desired effects of prescribed medications  8/12/2023 0539 by Katerin Vargas RN  Outcome: Not Progressing  8/11/2023 2236 by Katerin Vargas RN  Outcome: Not Progressing  Goal: Refrain from acting on delusional thinking/internal stimuli  Description: Interventions:  - Monitor patient closely, per order   - Utilize least restrictive measures   - Set reasonable limits, give positive feedback for acceptable   - Administer medications as ordered and monitor of potential side effects  8/12/2023 0539 by Katerin Vargas RN  Outcome: Not Progressing  8/11/2023 2236 by Katerin Vargas RN  Outcome: Not Progressing  Goal: Agree to be compliant with medication regime, as prescribed and report medication side effects  Description: Interventions:  - Offer appropriate PRN medication and supervise ingestion; conduct AIMS, as needed   8/12/2023 0539 by Katerin Vargas RN  Outcome: Not Progressing  8/11/2023 2236 by Katerin Vargas RN  Outcome: Not Progressing  Goal: Attend and participate in unit activities, including therapeutic, recreational, and educational groups  Description: Interventions:  -Encourage Visitation and family involvement in care  8/12/2023 0539 by Samia Yusuf RN  Outcome: Not Progressing  8/11/2023 2236 by Samia Yusuf RN  Outcome: Not Progressing  Goal: Recognize dysfunctional thoughts, communicate reality-based thoughts at the time of discharge  Description: Interventions:  - Provide medication and psycho-education to assist patient in compliance and developing insight into his/her illness   8/12/2023 0539 by Samia Yusuf RN  Outcome: Not Progressing  8/11/2023 2236 by Samia Yusuf RN  Outcome: Not Progressing  Goal: Complete daily ADLs, including personal hygiene independently, as able  Description: Interventions:  - Observe, teach, and assist patient with ADLS  - Monitor and promote a balance of rest/activity, with adequate nutrition and elimination   8/12/2023 0539 by Samia Yusuf RN  Outcome: Not Progressing  8/11/2023 2236 by Samia Yusuf RN  Outcome: Not Progressing     Problem: Ineffective Coping  Goal: Cooperates with admission process  Description: Interventions:   - Complete admission process  8/12/2023 0539 by Samia Yusuf RN  Outcome: Not Progressing  8/11/2023 2236 by Samia Yusuf RN  Outcome: Not Progressing  Goal: Identifies ineffective coping skills  8/12/2023 0539 by Samia Yusuf RN  Outcome: Not Progressing  8/11/2023 2236 by Samia Yusuf RN  Outcome: Not Progressing  Goal: Identifies healthy coping skills  8/12/2023 0539 by Samia Yusuf RN  Outcome: Not Progressing  8/11/2023 2236 by Samia Yusuf RN  Outcome: Not Progressing  Goal: Demonstrates healthy coping skills  8/12/2023 0539 by Samia Yusuf RN  Outcome: Not Progressing  8/11/2023 2236 by Samia Yusuf RN  Outcome: Not Progressing  Goal: Participates in unit activities  Description: Interventions:  - Provide therapeutic environment   - Provide required programming   - Redirect inappropriate behaviors   8/12/2023 0539 by Anup Gil RN  Outcome: Not Progressing  8/11/2023 2236 by Anup Gil RN  Outcome: Not Progressing     Problem: INVOLUNTARY ADMIT  Goal: Will cooperate with staff recommendations and doctor's orders and will demonstrate appropriate behavior  Description: INTERVENTIONS:  - Treat underlying conditions and offer medication as ordered  - Educate regarding involuntary admission procedures and rules  - Utilize positive consistent limit setting strategies to support patient and staff safety  8/12/2023 0539 by Anup Gil RN  Outcome: Not Progressing  8/11/2023 2236 by Anup Gil RN  Outcome: Not Progressing

## 2023-08-12 NOTE — TREATMENT TEAM
Patient has pressured speech, but said, " fuck it, I'm going to be honest about my past drug and alcolhol use" Patient is cooperative, with a bright affect.

## 2023-08-12 NOTE — ASSESSMENT & PLAN NOTE
· Vital signs stable afebrile patient seen and examined by myself Labs were ordered for 8/12/2023 however they have not been drawn  · Patiently medically stable for admit  · [facility] IM will sign off please call with any questions or concerns

## 2023-08-12 NOTE — PROGRESS NOTES
08/12/23 1300   Activity/Group Checklist   Group Other (Comment)  (OPEN STUDIO Art Therapy/Social Group)   Attendance Attended   Attendance Duration (min) Greater than 60   Interactions Interacted appropriately   Affect/Mood Appropriate   Goals Achieved Able to listen to others; Able to engage in interactions

## 2023-08-13 LAB
25(OH)D3 SERPL-MCNC: 36.8 NG/ML (ref 30–100)
ATRIAL RATE: 80 BPM
FOLATE SERPL-MCNC: 15.4 NG/ML
P AXIS: 54 DEGREES
PR INTERVAL: 138 MS
QRS AXIS: -4 DEGREES
QRSD INTERVAL: 94 MS
QT INTERVAL: 356 MS
QTC INTERVAL: 410 MS
T WAVE AXIS: 17 DEGREES
TREPONEMA PALLIDUM IGG+IGM AB [PRESENCE] IN SERUM OR PLASMA BY IMMUNOASSAY: NORMAL
VENTRICULAR RATE: 80 BPM
VIT B12 SERPL-MCNC: 316 PG/ML (ref 180–914)

## 2023-08-13 PROCEDURE — 99233 SBSQ HOSP IP/OBS HIGH 50: CPT | Performed by: STUDENT IN AN ORGANIZED HEALTH CARE EDUCATION/TRAINING PROGRAM

## 2023-08-13 PROCEDURE — 93010 ELECTROCARDIOGRAM REPORT: CPT | Performed by: STUDENT IN AN ORGANIZED HEALTH CARE EDUCATION/TRAINING PROGRAM

## 2023-08-13 PROCEDURE — 93005 ELECTROCARDIOGRAM TRACING: CPT

## 2023-08-13 RX ORDER — AMLODIPINE BESYLATE 5 MG/1
5 TABLET ORAL DAILY
Status: DISCONTINUED | OUTPATIENT
Start: 2023-08-13 | End: 2023-08-13

## 2023-08-13 RX ORDER — CLONIDINE HYDROCHLORIDE 0.1 MG/1
0.2 TABLET ORAL EVERY 12 HOURS SCHEDULED
Status: DISCONTINUED | OUTPATIENT
Start: 2023-08-13 | End: 2023-08-19

## 2023-08-13 RX ADMIN — CLONIDINE HYDROCHLORIDE 0.2 MG: 0.1 TABLET ORAL at 21:35

## 2023-08-13 RX ADMIN — RISPERIDONE 2 MG: 2 TABLET ORAL at 21:39

## 2023-08-13 RX ADMIN — CLONIDINE HYDROCHLORIDE 0.2 MG: 0.1 TABLET ORAL at 09:13

## 2023-08-13 NOTE — NURSING NOTE
Pt awake and alert, remains paranoid, suspicious, irritable and demanding. CIWA=2. Pt appears anxious and agitated at times when his requests are not handled as timely as he would like. Pt appears to have no insight into his mental illness, blaming others for him being here and reports feeling that he does not need to be here. Pt also remains paranoid about roommate and "cleaning up his piss every 5 minutes". Room changes made. Housekeeping on unit to clean room. Pt then cleaned bathroom with toothbrush. Pt compliant with AM vitals. Continues with HTN, medical made aware and ordered Norvasc for pt, which pt refused stating "I will only take it if its the same medication I had yesterday. I'm not going to be taking pill after pill after pill". Medical made aware of refusal and changed order to Clonidine which pt accepted. Pt paranoid, spent lengthy amount of time looking over medication labels. Reassurance provided. Q7 minute safety checks maintained. 1120: BP rechecked and WNL.

## 2023-08-13 NOTE — PROGRESS NOTES
Progress Note - [address] 41 y.o. male MRN: [id number]  Unit/Bed#: [facility] 345-02 Encounter: [id number]    Assessment/Plan   Principal Problem:    Schizoaffective disorder, bipolar type with good prognostic features ([address])  Active Problems:    Alcohol abuse, continuous    Cannabis dependence, continuous ([address])    Medical clearance for psychiatric admission    H/O medication noncompliance    Recommended Treatment:   Continue medications at current doses as below. Encourage group therapy, milieu therapy and occupational therapy  All current active medications have been reviewed  Encourage group therapy, milieu therapy and occupational therapy  Behavioral Health checks every 7 minutes    ----------------------------------------      Subjective:   Per nursing report, patient has been irritable, demanding, and paranoid on the unit. He did take scheduled risperidone last night. He had signed a 72 hour notice not understanding that this does not apply to patients on an involuntary commitment. Patient is calm but superficially cooperative on the unit today. He reports that his mood is "good". He is at times bizarre and disorganized during interaction. He mimics movements that this writer makes during assessment. He reports tolerating risperidone but has poor insight into his condition and need for treatment. He denies any SI, HI, or AVH.     Behavior over the last 24 hours:  unchanged  Sleep: normal  Appetite: normal  Medication side effects: No  ROS: all other systems are negative    Mental Status Evaluation:  Appearance:  casually dressed, dressed appropriately, adequate grooming   Behavior:  calm, bizarre, guarded, superficially cooperative   Speech:  normal rate and volume   Mood:  "good"   Affect:  constricted   Thought Process:  disorganized   Associations: concrete associations   Thought Content:  some paranoia   Perceptual Disturbances: denies auditory or visual hallucinations when asked, does not appear responding to internal stimuli   Risk Potential: Suicidal ideation - None  Homicidal ideation - None  Potential for aggression - Not at present   Sensorium:  oriented to person, place and time/date   Memory:  recent and remote memory grossly intact   Consciousness:  alert and awake   Attention/Concentration: attention span and concentration are age appropriate   Insight:  poor   Judgment: poor   Gait/Station: normal gait/station   Motor Activity: no abnormal movements     Medications: all current active meds have been reviewed.   Current Facility-Administered Medications   Medication Dose Route Frequency Provider Last Rate   • acetaminophen  650 mg Oral Q6H PRN Reji Howard MD     • acetaminophen  650 mg Oral Q4H PRN Reji Howard MD     • acetaminophen  975 mg Oral Q6H PRN Reji Howard MD     • aluminum-magnesium hydroxide-simethicone  30 mL Oral Q4H PRN Reji Howard MD     • benztropine  1 mg Intramuscular Q4H PRN Max 6/day Reji Howard MD     • benztropine  1 mg Oral Q4H PRN Max 6/day Reji Howard MD     • cloNIDine  0.2 mg Oral Q12H [facility]     • hydrOXYzine HCL  50 mg Oral Q6H PRN Max 4/day Reji Howard MD      Or   • diphenhydrAMINE  50 mg Intramuscular Q6H PRN Reji Howard MD     • glycerin-hypromellose-  1 drop Both Eyes Q3H PRN Reji Howard MD     • hydrOXYzine HCL  100 mg Oral Q6H PRN Max 4/day Reji Howard MD      Or   • LORazepam  2 mg Intramuscular Q6H PRN Reji Howard MD     • hydrOXYzine HCL  25 mg Oral Q6H PRN Max 4/day Reji Howard MD     • OLANZapine  10 mg Oral Q3H PRN Max 3/day Reji Howard MD      Or   • OLANZapine  10 mg Intramuscular Q3H PRN Max 3/day Reji Howard MD     • OLANZapine  5 mg Oral Q3H PRN Max 6/day Reji Howard MD      Or   • OLANZapine  5 mg Intramuscular Q3H PRN Max 6/day Reji Howard MD     • OLANZapine  2.5 mg Oral Q3H PRN Max 8/day Leonarda Topete MD     • polyethylene glycol  17 g Oral Daily PRN Leonarda Topete MD     • propranolol  10 mg Oral Q8H PRN Leonarda Topete MD     • risperiDONE  2 mg Oral HS Leonarda Topete MD     • senna-docusate sodium  1 tablet Oral Daily PRN Leonarda Topete MD     • traZODone  50 mg Oral HS PRN Leonarda Topete MD         Labs: I have personally reviewed all pertinent laboratory/tests results  Most Recent Labs:   Lab Results   Component Value Date    WBC 9.14 08/12/2023    RBC 5.00 08/12/2023    HGB 15.6 08/12/2023    HCT 45.9 08/12/2023     08/12/2023    RDW 13.1 08/12/2023    NEUTROABS 5.83 08/12/2023    SODIUM 138 08/12/2023    K 3.9 08/12/2023     08/12/2023    CO2 31 08/12/2023    BUN 11 08/12/2023    CREATININE 0.76 08/12/2023    GLUC 127 08/12/2023    CALCIUM 10.0 08/12/2023    AST 39 08/12/2023    ALT 54 (H) 08/12/2023    ALKPHOS 66 08/12/2023    TP 7.4 08/12/2023    ALB 4.8 08/12/2023    TBILI 0.34 08/12/2023    CHOLESTEROL 142 08/12/2023    HDL 40 08/12/2023    TRIG 240 (H) 08/12/2023    LDLCALC 54 08/12/2023    NONHDLC 102 08/12/2023    VALPROICTOT <10 (L) 05/30/2022    CARBAMAZEPIN 7.4 10/09/2018    LSO0CBFAATOU 1.081 08/12/2023    RPR Non-Reactive 10/02/2018       Progress Toward Goals: progressing    Risks / Benefits of Treatment:    Risks, benefits, and possible side effects of medications explained to patient. Patient has limited understanding of risks and benefits of treatment at this time and needs ongoing explanation of treatment benefits and treatment plan. Counseling / Coordination of Care:    Patient's progress discussed with staff in treatment team meeting. Medications, treatment progress and treatment plan reviewed with patient.     Leonarda Topete MD 08/13/23

## 2023-08-13 NOTE — NURSING NOTE
Patient was paranoid about taking his medication, lhad to inspect the medication in the package and watch it being opened. Was guarded and irritable. Denies all psych symptoms at this time. Medication compliant, finally. Transposition Flap Text: The defect edges were debeveled with a #15 scalpel blade.  Given the location of the defect and the proximity to free margins a transposition flap was deemed most appropriate.  Using a sterile surgical marker, an appropriate transposition flap was drawn incorporating the defect.    The area thus outlined was incised deep to adipose tissue with a #15 scalpel blade.  The skin margins were undermined to an appropriate distance in all directions utilizing iris scissors.

## 2023-08-13 NOTE — NURSING NOTE
Patient awake and complaining to staff about "this fucking dirt bag in my room." He believes he felt a prick on his right rear shoulder, could not say what he thought had pricked him  when staff tried to clarify.   He made reference to his own cleanliness said he even got moved round from place to place when he was "in group home because they knew I would get it clean."  He has no insight into his mental illness and thinks he is just fine and should not be in hospital.

## 2023-08-14 PROCEDURE — 99232 SBSQ HOSP IP/OBS MODERATE 35: CPT | Performed by: STUDENT IN AN ORGANIZED HEALTH CARE EDUCATION/TRAINING PROGRAM

## 2023-08-14 RX ADMIN — RISPERIDONE 3 MG: 2 TABLET ORAL at 21:33

## 2023-08-14 RX ADMIN — CLONIDINE HYDROCHLORIDE 0.2 MG: 0.1 TABLET ORAL at 09:15

## 2023-08-14 RX ADMIN — CLONIDINE HYDROCHLORIDE 0.2 MG: 0.1 TABLET ORAL at 21:33

## 2023-08-14 NOTE — NURSING NOTE
Patient very irritable, doesn't want [address] wheeled into his " clean room." Had to inspect his medication, "still in the package", Entitled, Oppositional, but finally took his meds after his instructions were followed to a Stephen. Denies all psych symptoms at this time and was finally Medication compliant.

## 2023-08-14 NOTE — PROGRESS NOTES
Progress Note - Behavioral Health   Anu Cheek 41 y.o. male MRN: [id number]  Unit/Bed#: [facility] 345-02 Encounter: [id number]    Assessment/Plan   Principal Problem:    Schizoaffective disorder, bipolar type with good prognostic features ([address])  Active Problems:    Alcohol abuse, continuous    Cannabis dependence, continuous ([facility])    Medical clearance for psychiatric admission    H/O medication noncompliance      Recommended Treatment:   Increase Risperidone to 3 mg  Will plan to titrate to 4 mg nightly and then transition to EMBI with group therapy, milieu therapy and occupational therapy. Continue frequent safety checks and vitals per unit protocol. Case discussed with treatment team.  Risks, benefits and possible side effects of Medications: Risks, benefits, and possible side effects of medications have been explained to the patient, who verbalizes understanding    ------------------------------------------------------------    Subjective: Per nursing report, Michael Price has been cooperative on the unit and irritable, guarded, and demanding things. He has been reported to be obsessed with cleaning. He is eventually compliant with scheduled medications, but remains suspicous. Patient had his 303 hearing this morning and participated in this. Michael Price was seen for follow-up this morning in his room. Michael Price expressed he did not want us to enter his room, and insisted we stay at the doorway so he doesn't have to "scrub" the floors again. Patient was guarded, irritable, and suspicious, asking repeatedly what our "credentials" were. At times he was circumstantial, but easily redirectable. While discussing his medication, at one point he went to his Bible and began reading a passage from it. He kept requesting to start his Invega RIVERS today. It was explained multiple times that we would need to titrate up on oral risperidone before we can start the Invega shot and the shot would not be today. Patient expressed understanding. Rudy Stewart reports his mood is "alright" and his sleep and appetite are adequate. His only compliant today is that he requests staff not enter his room, as he wants to keep it clean. Denies SI/HI/AVH at this time. Progress Toward Goals: Limited improvement    Psychiatric Review of Systems:  Behavior over the last 24 hours: unchanged  Sleep: normal  Appetite: adequate  Medication side effects: none verbalized  ROS: Complete review of systems is negative except as noted above.     Vital signs in last 24 hours:  Temp:  [97.5 °F (36.4 °C)-98.1 °F (36.7 °C)] 97.5 °F (36.4 °C)  HR:  [81-96] 85  Resp:  [16] 16  BP: (154-158)/(89-93) 156/89    Mental Status Exam:  Appearance:  alert, intense eye contact, appears stated age, casually dressed, appropriate grooming and hygiene and tattooed   Behavior:  calm, guarded and bizarre, superficially cooperative   Motor: no abnormal movements and normal gait and balance   Speech:  spontaneous, clear, normal rate and coherent   Mood:  "alright"   Affect:  constricted and irritable   Thought Process:  disorganized, logical, circumstantial   Thought Content: Shinto preoccupation, paranoid   Perceptual disturbances: no reported hallucinations and does not appear to be responding to internal stimuli at this time   Risk Potential: No active or passive suicidal or homicidal ideation was verbalized during interview   Cognition: oriented to person, place, time, and situation, appears to be of average intelligence and cognition not formally tested   Insight:  Poor   Judgment: Poor     Current Medications:  Current Facility-Administered Medications   Medication Dose Route Frequency Provider Last Rate   • acetaminophen  650 mg Oral Q6H PRN Jose L Braxton MD     • acetaminophen  650 mg Oral Q4H PRN Jose L Braxotn MD     • acetaminophen  975 mg Oral Q6H PRN Jose L Braxton MD     • aluminum-magnesium hydroxide-simethicone  30 mL Oral Q4H PRN John Escoto MD     • benztropine  1 mg Intramuscular Q4H PRN Max 6/day John Escoto MD     • benztropine  1 mg Oral Q4H PRN Max 6/day John Escoto MD     • cloNIDine  0.2 mg Oral Q12H [facility] Kathy WheatleyMarielaJosé ManuelLISY     • hydrOXYzine HCL  50 mg Oral Q6H PRN Max 4/day John Escoto MD      Or   • diphenhydrAMINE  50 mg Intramuscular Q6H PRN John Escoto MD     • glycerin-hypromellose-  1 drop Both Eyes Q3H PRN John Escoto MD     • hydrOXYzine HCL  100 mg Oral Q6H PRN Max 4/day John Escoto MD      Or   • LORazepam  2 mg Intramuscular Q6H PRN John Escoto MD     • hydrOXYzine HCL  25 mg Oral Q6H PRN Max 4/day John Esctoo MD     • OLANZapine  10 mg Oral Q3H PRN Max 3/day John Escoto MD      Or   • OLANZapine  10 mg Intramuscular Q3H PRN Max 3/day John Escoto MD     • OLANZapine  5 mg Oral Q3H PRN Max 6/day John Escoto MD      Or   • OLANZapine  5 mg Intramuscular Q3H PRN Max 6/day John Escoto MD     • OLANZapine  2.5 mg Oral Q3H PRN Max 8/day John Escoto MD     • polyethylene glycol  17 g Oral Daily PRN John Escoto MD     • propranolol  10 mg Oral Q8H PRN John Escoto MD     • risperiDONE  2 mg Oral HS John Escoto MD     • senna-docusate sodium  1 tablet Oral Daily PRN John Escoto MD     • traZODone  50 mg Oral HS PRN John Escoto MD         Behavioral Health Medications: all current active meds have been reviewed. Changes as in plan section above. Laboratory results:  I have personally reviewed all pertinent laboratory/tests results.   Recent Results (from the past 48 hour(s))   Comprehensive metabolic panel    Collection Time: 08/12/23  4:47 PM   Result Value Ref Range    Sodium 138 135 - 147 mmol/L    Potassium 3.9 3.5 - 5.3 mmol/L    Chloride 100 96 - 108 mmol/L    CO2 31 21 - 32 mmol/L    ANION GAP 7 mmol/L    BUN 11 5 - 25 mg/dL Creatinine 0.76 0.60 - 1.30 mg/dL    Glucose 127 65 - 140 mg/dL    Calcium 10.0 8.4 - 10.2 mg/dL    AST 39 13 - 39 U/L    ALT 54 (H) 7 - 52 U/L    Alkaline Phosphatase 66 34 - 104 U/L    Total Protein 7.4 6.4 - 8.4 g/dL    Albumin 4.8 3.5 - 5.0 g/dL    Total Bilirubin 0.34 0.20 - 1.00 mg/dL    eGFR 110 ml/min/1.73sq m   CBC and differential    Collection Time: 08/12/23  4:47 PM   Result Value Ref Range    WBC 9.14 4.31 - 10.16 Thousand/uL    RBC 5.00 3.88 - 5.62 Million/uL    Hemoglobin 15.6 12.0 - 17.0 g/dL    Hematocrit 45.9 36.5 - 49.3 %    MCV 92 82 - 98 fL    MCH 31.2 26.8 - 34.3 pg    MCHC 34.0 31.4 - 37.4 g/dL    RDW 13.1 11.6 - 15.1 %    MPV 9.1 8.9 - 12.7 fL    Platelets 206 417 - 050 Thousands/uL    nRBC 0 /100 WBCs    Neutrophils Relative 64 43 - 75 %    Immat GRANS % 0 0 - 2 %    Lymphocytes Relative 26 14 - 44 %    Monocytes Relative 9 4 - 12 %    Eosinophils Relative 1 0 - 6 %    Basophils Relative 0 0 - 1 %    Neutrophils Absolute 5.83 1.85 - 7.62 Thousands/µL    Immature Grans Absolute 0.02 0.00 - 0.20 Thousand/uL    Lymphocytes Absolute 2.34 0.60 - 4.47 Thousands/µL    Monocytes Absolute 0.79 0.17 - 1.22 Thousand/µL    Eosinophils Absolute 0.13 0.00 - 0.61 Thousand/µL    Basophils Absolute 0.03 0.00 - 0.10 Thousands/µL   TSH, 3rd generation with Free T4 reflex    Collection Time: 08/12/23  4:47 PM   Result Value Ref Range    TSH 3RD GENERATON 1.081 0.450 - 4.500 uIU/mL   Vitamin B12    Collection Time: 08/12/23  4:47 PM   Result Value Ref Range    Vitamin B-12 316 180 - 914 pg/mL   Folate    Collection Time: 08/12/23  4:47 PM   Result Value Ref Range    Folate 15.4 >5.9 ng/mL   Vitamin D 25 hydroxy    Collection Time: 08/12/23  4:47 PM   Result Value Ref Range    Vit D, 25-Hydroxy 36.8 30.0 - 100.0 ng/mL   Lipid panel    Collection Time: 08/12/23  4:47 PM   Result Value Ref Range    Cholesterol 142 See Comment mg/dL    Triglycerides 240 (H) See Comment mg/dL    HDL, Direct 40 >=40 mg/dL    LDL Calculated 54 0 - 100 mg/dL    Non-HDL-Chol (CHOL-HDL) 102 mg/dl   Total Syphilis (IgG/IgM) Screening    Collection Time: 08/12/23  4:47 PM   Result Value Ref Range    Syphilis Total Antibody Non-reactive Non-Reactive   ECG 12 lead    Collection Time: 08/13/23  4:23 PM   Result Value Ref Range    Ventricular Rate 80 BPM    Atrial Rate 80 BPM    ND Interval 138 ms    QRSD Interval 94 ms    QT Interval 356 ms    QTC Interval 410 ms    P [place] 54 degrees    QRS Axis -4 degrees    T Wave Axis 17 degrees        Nicole Albarran, OMS4

## 2023-08-14 NOTE — PROGRESS NOTES
MICU Met with patient twice to complete his admission self assessment. He was paranoid, controlling and focused on cleanliness. When I walked into his bedroom he told me where I can stand so he does not have to wash the floor again. He believes his family is responsible for his hospitalization and he is in denial of his mental health. Ironically he knows he needs the [place] shot in order to get discharged. He does have interests of thing he likes to do. 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 He has eluded to his childhood where he states he was raised by his grandmother and his mother partied. He also noted that he had behavioral problems in school (anger issues) and he quit in the 9th grade. Patient at times may seem resistant but this writer believes it is more a symptom of his mental illness in not being able to think clearly.

## 2023-08-14 NOTE — PLAN OF CARE
Problem: Alteration in Thoughts and Perception  Goal: Treatment Goal: Gain control of psychotic behaviors/thinking, reduce/eliminate presenting symptoms and demonstrate improved reality functioning upon discharge  8/13/2023 2245 by Marcie Portillo RN  Outcome: Progressing  8/13/2023 2245 by Marcie Portillo RN  Outcome: Progressing  Goal: Verbalize thoughts and feelings  Description: Interventions:  - Promote a nonjudgmental and trusting relationship with the patient through active listening and therapeutic communication  - Assess patient's level of functioning, behavior and potential for risk  - Engage patient in 1 on 1 interactions  - Encourage patient to express fears, feelings, frustrations, and discuss symptoms    - [place] patient to reality, help patient recognize reality-based thinking   - Administer medications as ordered and assess for potential side effects  - Provide the patient education related to the signs and symptoms of the illness and desired effects of prescribed medications  8/13/2023 2245 by Marcie Portillo RN  Outcome: Progressing  8/13/2023 2245 by Marcie Portillo RN  Outcome: Progressing  Goal: Refrain from acting on delusional thinking/internal stimuli  Description: Interventions:  - Monitor patient closely, per order   - Utilize least restrictive measures   - Set reasonable limits, give positive feedback for acceptable   - Administer medications as ordered and monitor of potential side effects  8/13/2023 2245 by Marcie Portillo RN  Outcome: Progressing  8/13/2023 2245 by Marcie Portillo RN  Outcome: Progressing  Goal: Agree to be compliant with medication regime, as prescribed and report medication side effects  Description: Interventions:  - Offer appropriate PRN medication and supervise ingestion; conduct AIMS, as needed   8/13/2023 2245 by Marcie Portillo RN  Outcome: Progressing  8/13/2023 2245 by Marcie Portillo RN  Outcome: Progressing  Goal: Attend and participate in unit activities, including therapeutic, recreational, and educational groups  Description: Interventions:  -Encourage Visitation and family involvement in care  8/13/2023 2245 by Ab Rojas RN  Outcome: Progressing  8/13/2023 2245 by Ab Rojas RN  Outcome: Progressing  Goal: Recognize dysfunctional thoughts, communicate reality-based thoughts at the time of discharge  Description: Interventions:  - Provide medication and psycho-education to assist patient in compliance and developing insight into his/her illness   8/13/2023 2245 by Ab Rojas RN  Outcome: Progressing  8/13/2023 2245 by Ab Rojas RN  Outcome: Progressing  Goal: Complete daily ADLs, including personal hygiene independently, as able  Description: Interventions:  - Observe, teach, and assist patient with ADLS  - Monitor and promote a balance of rest/activity, with adequate nutrition and elimination   8/13/2023 2245 by Ab Rojas RN  Outcome: Progressing  8/13/2023 2245 by Ab Rojas RN  Outcome: Progressing

## 2023-08-14 NOTE — PLAN OF CARE
Problem: Alteration in Thoughts and Perception  Goal: Treatment Goal: Gain control of psychotic behaviors/thinking, reduce/eliminate presenting symptoms and demonstrate improved reality functioning upon discharge  Outcome: Progressing  Goal: Verbalize thoughts and feelings  Description: Interventions:  - Promote a nonjudgmental and trusting relationship with the patient through active listening and therapeutic communication  - Assess patient's level of functioning, behavior and potential for risk  - Engage patient in 1 on 1 interactions  - Encourage patient to express fears, feelings, frustrations, and discuss symptoms    - [place] patient to reality, help patient recognize reality-based thinking   - Administer medications as ordered and assess for potential side effects  - Provide the patient education related to the signs and symptoms of the illness and desired effects of prescribed medications  Outcome: Progressing  Goal: Refrain from acting on delusional thinking/internal stimuli  Description: Interventions:  - Monitor patient closely, per order   - Utilize least restrictive measures   - Set reasonable limits, give positive feedback for acceptable   - Administer medications as ordered and monitor of potential side effects  Outcome: Progressing  Goal: Agree to be compliant with medication regime, as prescribed and report medication side effects  Description: Interventions:  - Offer appropriate PRN medication and supervise ingestion; conduct AIMS, as needed   Outcome: Progressing  Goal: Attend and participate in unit activities, including therapeutic, recreational, and educational groups  Description: Interventions:  -Encourage Visitation and family involvement in care  Outcome: Progressing  Goal: Recognize dysfunctional thoughts, communicate reality-based thoughts at the time of discharge  Description: Interventions:  - Provide medication and psycho-education to assist patient in compliance and developing insight into his/her illness   Outcome: Progressing  Goal: Complete daily ADLs, including personal hygiene independently, as able  Description: Interventions:  - Observe, teach, and assist patient with ADLS  - Monitor and promote a balance of rest/activity, with adequate nutrition and elimination   Outcome: Progressing

## 2023-08-14 NOTE — NURSING NOTE
Pt is suspicious of medications, wants to open the package himself, and look at the computer screen. Pt has poor boundaries and needs frequent reminders to back away from this writer and the nurses station door. Pt making inappropriate remarks about this writers appearance and glasses during interaction. Denies psych symptoms to this writer. Rupinder Jesus Albertoeleazar having loud/disorganized phone calls talking about peeping toms, 9/11 and god stopping him from chopping everyone up with an axe. Denies any unmet needs or complaints at this time.

## 2023-08-14 NOTE — SOCIAL WORK
Patient Intake   Living Arrangement Room for Rent   Can patient return home yes   Address to discharge to [place], [place] ([place]), [address]   Patient's Telephone Number [phone]   Patient's e-mail Address Denies   Access to firearms Denies   Type of work Currently unemployed but has a history of working as a    School grade/year 9th grade   Marital Status/Children , 1 adult child   [address] self   Preferred [address] [facility] ([place])   Admission Status    Status of admission [address]   Patient History   Stressor/Trigger Medication non-compliance since discharge from [facility] in June/July. Disorganized behaviors, agitation, aggression towards family. Treatment History [facility] - 4/18/22-4/28/22  [facility] - 3/11/2022  [facility] [place] [facility] 9/30/2018-10/9/2018  [facility]   Current psychiatrist/therapist None   Suicide Attempts Denies   Family History of Mental Health Father - alcohol abuse  Mental illness in the family but unable to identify who or their diagnosis.     ACT/ICM None   Legal Issues Hx of arrest for aggravated assault and criminal michief   Substance Abuse UDS: +THC  Audit Score: 28  Nicotine/Tobacco: Denies     Trauma/Losses Denies       Releases of Information  Refused

## 2023-08-14 NOTE — PROGRESS NOTES
08/14/23 0921   Team Meeting   Meeting Type Daily Rounds   Team Members Present   Team Members Present Physician;Nurse;   Physician Team Member [address] [place] [address] [facility] Candido   Patient/Family Present   Patient Present No   Patient's Family Present No   Readmit score 18. Pt is a new admit her on a 302 for paranoia, disorganization, agitation and physical aggression. Pt agitated yesterday and punched a door. Pt refused PRN medication. Focused on cleanliness. Poor insight into his mental illness. Pt remains paranoid, suspicious, irritable. Paranoid this morning with SW during 303 hearing, stating SW is an imposter. Discharge to be determined.

## 2023-08-14 NOTE — NURSING NOTE
Pt walking away when attempting to give scheduled clonidine. Pt has intense stare, appears to be very agitated when this writer speaks to him.

## 2023-08-15 PROCEDURE — 99232 SBSQ HOSP IP/OBS MODERATE 35: CPT | Performed by: STUDENT IN AN ORGANIZED HEALTH CARE EDUCATION/TRAINING PROGRAM

## 2023-08-15 RX ORDER — RISPERIDONE 2 MG/1
4 TABLET ORAL
Status: DISCONTINUED | OUTPATIENT
Start: 2023-08-15 | End: 2023-08-21

## 2023-08-15 RX ADMIN — RISPERIDONE 4 MG: 2 TABLET ORAL at 21:25

## 2023-08-15 RX ADMIN — CLONIDINE HYDROCHLORIDE 0.2 MG: 0.1 TABLET ORAL at 20:43

## 2023-08-15 RX ADMIN — CLONIDINE HYDROCHLORIDE 0.2 MG: 0.1 TABLET ORAL at 08:57

## 2023-08-15 NOTE — PROGRESS NOTES
08/14/23 1111   Team Meeting   Meeting Type Tx Team Meeting   Initial Conference Date 08/14/23   Team Members Present   Team Members Present Physician;Nurse;   Physician Team Member [facility] ON THE [facility]   Nursing Team Member [facility] Management Team Member Candido   Patient/Family Present   Patient Present Yes   Patient's Family Present No   Tx plan was reviewed and discussed with Pt. Pt was encouraged to attend groups. Medication was discussed with Pt. Pt remained irritable from 303 hearing and stated he did not agree with need for more treatment. Pt refused to sign tx plan.

## 2023-08-15 NOTE — TREATMENT TEAM
08/15/23 1300   Activity/Group Checklist   Group   (recovery group)   Attendance Attended   Attendance Duration (min) 46-60   Interactions Interacted appropriately   Affect/Mood Appropriate   Goals Achieved Discussed safety plan;Discussed coping strategies; Discussed self-esteem issues; Able to listen to others; Able to engage in interactions; Able to reflect/comment on own behavior;Able to self-disclose; Able to recieve feedback

## 2023-08-15 NOTE — PROGRESS NOTES
Progress Note - Behavioral Health   Megan Dyson 41 y.o. male MRN: [id number]  Unit/Bed#: [facility] 345-02 Encounter: [id number]    Assessment/Plan   Principal Problem:    Schizoaffective disorder, bipolar type with good prognostic features ([address])  Active Problems:    Alcohol abuse, continuous    Cannabis dependence, continuous ([facility])    Medical clearance for psychiatric admission    H/O medication noncompliance      Recommended Treatment:   Increase Risperidone to 4 mg  Will plan to transition to [address] [place] [address] with group therapy, milieu therapy and occupational therapy. Continue frequent safety checks and vitals per unit protocol. Case discussed with treatment team.  Risks, benefits and possible side effects of Medications: Risks, benefits, and possible side effects of medications have been explained to the patient, who verbalizes understanding    ------------------------------------------------------------    Subjective: Per nursing report, Tonia Mchugh has been paranoid, controlling, and focused on cleanliness, but compliant with scheduled medications. Tonia Mchugh was seen this morning for follow-up. He requested we do our assessment at the doorway again because of his obsession with keeping his room and floor clean. Patient is less irritable and intimidating today, but remains suspicious. He is eager to obtain his Invega shot and discharge. It was explained his risperidone would be increased to 4mg today and tomorrow he would receive the loading shot of Invega, followed by the maintenance shot on Monday. Patient was in agreement and expressed understanding. Tonia Mchugh reports his sleep and appetite are adequate, but feels that double portions remain necessary. He has no issues with his medications at this time. Denies SI/HI/AVH at this time.      Progress Toward Goals: Some improvement, less guarded and paranoid    Psychiatric Review of Systems:  Behavior over the last 24 hours: improved  Sleep: normal  Appetite: adequate  Medication side effects: none verbalized  ROS: Complete review of systems is negative except as noted above.     Vital signs in last 24 hours:  Temp:  [97.8 °F (36.6 °C)-98 °F (36.7 °C)] 98 °F (36.7 °C)  HR:  [] 100  Resp:  [17-18] 18  BP: (142-155)/() 151/104    Mental Status Exam:  Appearance:  alert, good eye contact, appears stated age, casually dressed, appropriate grooming and hygiene and tattooed   Behavior:  calm and superficially cooperative, but better than previous   Motor: no abnormal movements and normal gait and balance   Speech:  spontaneous, clear and coherent   Mood:  "actually slept"   Affect:  constricted and brighter than previous   Thought Process:  Organized, logical, goal-directed   Thought Content: no verbalized delusions or overt paranoia, Alevism preoccupation   Perceptual disturbances: no reported hallucinations and does not appear to be responding to internal stimuli at this time   Risk Potential: No active or passive suicidal or homicidal ideation was verbalized during interview   Cognition: oriented to person, place, time, and situation, appears to be of average intelligence and cognition not formally tested   Insight:  Limited   Judgment: Limited     Current Medications:  Current Facility-Administered Medications   Medication Dose Route Frequency Provider Last Rate   • acetaminophen  650 mg Oral Q6H PRN Theo Cifuentes MD     • acetaminophen  650 mg Oral Q4H PRN Theo Cifuentes MD     • acetaminophen  975 mg Oral Q6H PRN Theo Cifuentes MD     • aluminum-magnesium hydroxide-simethicone  30 mL Oral Q4H PRN Theo Cifuentes MD     • benztropine  1 mg Intramuscular Q4H PRN Max 6/day Theo Cifuentes MD     • benztropine  1 mg Oral Q4H PRN Max 6/day Theo Cifuentes MD     • cloNIDine  0.2 mg Oral Q12H [facility] LISY Miranda     • hydrOXYzine HCL  50 mg Oral Q6H PRN Max 4/day Theo Cifuentes MD      Or • diphenhydrAMINE  50 mg Intramuscular Q6H PRN Catalino Hummel MD     • glycerin-hypromellose-  1 drop Both Eyes Q3H PRN Catalino Hummel MD     • hydrOXYzine HCL  100 mg Oral Q6H PRN Max 4/day Catalino Hummel MD      Or   • LORazepam  2 mg Intramuscular Q6H PRN Catalino Hummel MD     • hydrOXYzine HCL  25 mg Oral Q6H PRN Max 4/day Catalino Hummel MD     • OLANZapine  10 mg Oral Q3H PRN Max 3/day Catalino Hummel MD      Or   • OLANZapine  10 mg Intramuscular Q3H PRN Max 3/day Catalino Hummel MD     • OLANZapine  5 mg Oral Q3H PRN Max 6/day Catalino Hummel MD      Or   • OLANZapine  5 mg Intramuscular Q3H PRN Max 6/day Catalino Hummel MD     • OLANZapine  2.5 mg Oral Q3H PRN Max 8/day Catalino Hummel MD     • polyethylene glycol  17 g Oral Daily PRN Catalino Hummel MD     • propranolol  10 mg Oral Q8H PRN Catalino Hummel MD     • risperiDONE  3 mg Oral HS Catalino Hummel MD     • senna-docusate sodium  1 tablet Oral Daily PRN Catalino Hummel MD     • traZODone  50 mg Oral HS PRN Catalino Hummel MD         Behavioral Health Medications: all current active meds have been reviewed. Changes as in plan section above. Laboratory results:  I have personally reviewed all pertinent laboratory/tests results.   Recent Results (from the past 48 hour(s))   ECG 12 lead    Collection Time: 08/13/23  4:23 PM   Result Value Ref Range    Ventricular Rate 80 BPM    Atrial Rate 80 BPM    OH Interval 138 ms    QRSD Interval 94 ms    QT Interval 356 ms    QTC Interval 410 ms    P [place] 54 degrees    QRS Axis -4 degrees    T Wave Axis 17 degrees        Nicole Albarran OMS4

## 2023-08-15 NOTE — NURSING NOTE
Patient remained in behavioral control. Patient denied SI/AVH. Patient was compliant with scheduled medications. Staff to maintain continuous rounding for safety and support.

## 2023-08-15 NOTE — NURSING NOTE
Pt is calm and pleasant with a less constricted affect. Eye contact is good, more spontaneous speech present as pt explains how he quit cigarettes and contributes this to God. Pt repeats, "that's the God honest truth. I went from 2 and a hlf packets per day to none. I prayed to God and just like that I quit in a day. And that's the God honest truth." Pt says he is not paranoid but does wish to see medication before taking it. He says he "slept really well" last night. He denies Si/HI/AVH, depression or anxiety. No unmet needs at this time.

## 2023-08-15 NOTE — PLAN OF CARE
Problem: Alteration in Thoughts and Perception  Goal: Treatment Goal: Gain control of psychotic behaviors/thinking, reduce/eliminate presenting symptoms and demonstrate improved reality functioning upon discharge  Outcome: Progressing  Goal: Verbalize thoughts and feelings  Description: Interventions:  - Promote a nonjudgmental and trusting relationship with the patient through active listening and therapeutic communication  - Assess patient's level of functioning, behavior and potential for risk  - Engage patient in 1 on 1 interactions  - Encourage patient to express fears, feelings, frustrations, and discuss symptoms    - [place] patient to reality, help patient recognize reality-based thinking   - Administer medications as ordered and assess for potential side effects  - Provide the patient education related to the signs and symptoms of the illness and desired effects of prescribed medications  Outcome: Progressing  Goal: Refrain from acting on delusional thinking/internal stimuli  Description: Interventions:  - Monitor patient closely, per order   - Utilize least restrictive measures   - Set reasonable limits, give positive feedback for acceptable   - Administer medications as ordered and monitor of potential side effects  Outcome: Progressing  Goal: Agree to be compliant with medication regime, as prescribed and report medication side effects  Description: Interventions:  - Offer appropriate PRN medication and supervise ingestion; conduct AIMS, as needed   Outcome: Progressing  Goal: Attend and participate in unit activities, including therapeutic, recreational, and educational groups  Description: Interventions:  -Encourage Visitation and family involvement in care  Outcome: Progressing  Goal: Recognize dysfunctional thoughts, communicate reality-based thoughts at the time of discharge  Description: Interventions:  - Provide medication and psycho-education to assist patient in compliance and developing insight into his/her illness   Outcome: Progressing  Goal: Complete daily ADLs, including personal hygiene independently, as able  Description: Interventions:  - Observe, teach, and assist patient with ADLS  - Monitor and promote a balance of rest/activity, with adequate nutrition and elimination   Outcome: Progressing     Problem: Ineffective Coping  Goal: Cooperates with admission process  Description: Interventions:   - Complete admission process  Outcome: Progressing  Goal: Identifies ineffective coping skills  Outcome: Progressing  Goal: Identifies healthy coping skills  Outcome: Progressing  Goal: Demonstrates healthy coping skills  Outcome: Progressing  Goal: Participates in unit activities  Description: Interventions:  - Provide therapeutic environment   - Provide required programming   - Redirect inappropriate behaviors   Outcome: Progressing     Problem: INVOLUNTARY ADMIT  Goal: Will cooperate with staff recommendations and doctor's orders and will demonstrate appropriate behavior  Description: INTERVENTIONS:  - Treat underlying conditions and offer medication as ordered  - Educate regarding involuntary admission procedures and rules  - Utilize positive consistent limit setting strategies to support patient and staff safety  Outcome: Progressing

## 2023-08-15 NOTE — PROGRESS NOTES
08/15/23 0921   Team Meeting   Meeting Type Daily Rounds   Team Members Present   Team Members Present Physician;Nurse;   Physician Team Member [facility] ON THE [facility]   Nursing Team Member [facility] Management Team Member Candido   Patient/Family Present   Patient Present No   Patient's Family Present No   Pt paranoid yesterday regarding medications, requesting to inspect packages prior to taking medication. Religiously preoccupied. Discharge possible for next week.

## 2023-08-15 NOTE — NURSING NOTE
Pt is demanding and irritable at times throughout the evening. Pt was speaking in a loud lecturing manor to a peer in the small tv room acting as a fatherly figure to him. The patients were asked to continue their conversation elsewhere as other patients wanted to watch tv. The conversation was moved to the middle of the hallway. Pt required redirection to move out of the hallway several times. The patient then made a demeaning comment.

## 2023-08-16 PROCEDURE — 99232 SBSQ HOSP IP/OBS MODERATE 35: CPT | Performed by: STUDENT IN AN ORGANIZED HEALTH CARE EDUCATION/TRAINING PROGRAM

## 2023-08-16 RX ORDER — IBUPROFEN 600 MG/1
600 TABLET ORAL EVERY 8 HOURS PRN
Status: DISCONTINUED | OUTPATIENT
Start: 2023-08-16 | End: 2023-08-22 | Stop reason: HOSPADM

## 2023-08-16 RX ORDER — CHLORAL HYDRATE 500 MG
1000 CAPSULE ORAL DAILY
Status: DISCONTINUED | OUTPATIENT
Start: 2023-08-16 | End: 2023-08-22 | Stop reason: HOSPADM

## 2023-08-16 RX ADMIN — PALIPERIDONE PALMITATE 234 MG: 234 INJECTION INTRAMUSCULAR at 09:20

## 2023-08-16 RX ADMIN — OMEGA-3 FATTY ACIDS CAP 1000 MG 1000 MG: 1000 CAP at 15:20

## 2023-08-16 RX ADMIN — CLONIDINE HYDROCHLORIDE 0.2 MG: 0.1 TABLET ORAL at 21:24

## 2023-08-16 RX ADMIN — RISPERIDONE 4 MG: 2 TABLET ORAL at 21:24

## 2023-08-16 RX ADMIN — CLONIDINE HYDROCHLORIDE 0.2 MG: 0.1 TABLET ORAL at 09:19

## 2023-08-16 NOTE — QUICK NOTE
Called to evaluate patient for a painful right foot with discolored right 2nd toe. Patient reports discomfort to the base of his right 2nd toe with an area of dry skin. He is ambulating the unit without shoes. Recommend moisturizing BID, NSAIDs for pain, and surgical shoe if patient will tolerate.

## 2023-08-16 NOTE — PROGRESS NOTES
Progress Note - [address] 41 y.o. male MRN: [id number]  Unit/Bed#: [facility] 345-02 Encounter: [id number]    Assessment/Plan   Principal Problem:    Schizoaffective disorder, bipolar type with good prognostic features ([address])  Active Problems:    Alcohol abuse, continuous    Cannabis dependence, continuous ([address])    Medical clearance for psychiatric admission    H/O medication noncompliance      Recommended Treatment:   1. Continue risperidone 4 mg nightly. 2. Received Meiliano Frandy shot this morning, plan to give maintenance dose Monday 8/21    Continue with group therapy, milieu therapy and occupational therapy. Continue frequent safety checks and vitals per unit protocol. Case discussed with treatment team.  Risks, benefits and possible side effects of Medications: Risks, benefits, and possible side effects of medications have been explained to the patient, who verbalizes understanding    ------------------------------------------------------------    Subjective: Per nursing report, J Luis Ramos has been mostly cooperative on the unit and compliant with scheduled medications. He was talking with a peer in a loud lecturing manner and required redirection multiple times. J Luis Ramos was seen in his doorway for follow-up. He is less irritable, guarded, and anxious today. J Luis Ramos remains somewhat bizarre, obsessed with cleanliness, and religiously preoccupied. He did however, let us enter his room briefly to show us art he made in group. He reports he has no issues with his Invega shot today, but requested again that his maintenamce dose be given earlier. It was explained again that the maintenance dose would need to be Monday. Patient expressed understanding. J Luis Ramos reports his sleep and appetite have been adequate and he has no issues with his medications. After questioning, J Luis Ramos revealed he does not wish to engage with the peer he was seen lecturing yesterday.  He informs us he ended up walking away because the situation was bothering him. Denies SI/HI/AVH at this time. Progress Toward Goals: Some improvement, less irritable, anxious, and guarded    Psychiatric Review of Systems:  Behavior over the last 24 hours: improved  Sleep: normal  Appetite: adequate  Medication side effects: none verbalized  ROS: Complete review of systems is negative except as noted above.     Vital signs in last 24 hours:  Temp:  [97.2 °F (36.2 °C)] 97.2 °F (36.2 °C)  HR:  [84] 84  Resp:  [16] 16  BP: (138)/(88) 138/88    Mental Status Exam:  Appearance:  alert, good eye contact, appears stated age, casually dressed, appropriate grooming and hygiene, tattooed and smiling appropriately   Behavior:  calm and cooperative   Motor: no abnormal movements and normal gait and balance   Speech:  spontaneous, clear and coherent   Mood:  "good"   Affect:  constricted and brighter than previous   Thought Process:  Organized, logical, goal-directed   Thought Content: no verbalized delusions or overt paranoia, Gnosticist preoccupation   Perceptual disturbances: no reported hallucinations and does not appear to be responding to internal stimuli at this time   Risk Potential: No active or passive suicidal or homicidal ideation was verbalized during interview   Cognition: oriented to person, place, time, and situation, appears to be of average intelligence and cognition not formally tested   Insight:  Limited but improving   Judgment: Limited but improving     Current Medications:  Current Facility-Administered Medications   Medication Dose Route Frequency Provider Last Rate   • acetaminophen  650 mg Oral Q6H PRN Ruben Curtis MD     • acetaminophen  650 mg Oral Q4H PRN Ruben Curtis MD     • acetaminophen  975 mg Oral Q6H PRN Ruben Curtis MD     • aluminum-magnesium hydroxide-simethicone  30 mL Oral Q4H PRN Ruben Curtis MD     • benztropine  1 mg Intramuscular Q4H PRN Max 6/day Ruben Curtis MD     • benztropine  1 mg Oral Q4H PRN Max 6/day Génesis Maldonado MD     • cloNIDine  0.2 mg Oral Q12H [facility] LISY Miranda     • hydrOXYzine HCL  50 mg Oral Q6H PRN Max 4/day Génesis Maldonado MD      Or   • diphenhydrAMINE  50 mg Intramuscular Q6H PRN Génesis Maldonado MD     • glycerin-hypromellose-  1 drop Both Eyes Q3H PRN Génesis Maldonado MD     • hydrOXYzine HCL  100 mg Oral Q6H PRN Max 4/day Génesis Maldonado MD      Or   • LORazepam  2 mg Intramuscular Q6H PRN Génesis Maldonado MD     • hydrOXYzine HCL  25 mg Oral Q6H PRN Max 4/day Génesis Maldonado MD     • OLANZapine  10 mg Oral Q3H PRN Max 3/day Génesis Maldonado MD      Or   • OLANZapine  10 mg Intramuscular Q3H PRN Max 3/day Génesis Maldonado MD     • OLANZapine  5 mg Oral Q3H PRN Max 6/day Génesis Maldonado MD      Or   • OLANZapine  5 mg Intramuscular Q3H PRN Max 6/day Génesis Maldonado MD     • OLANZapine  2.5 mg Oral Q3H PRN Max 8/day Géensis Maldonado MD     • polyethylene glycol  17 g Oral Daily PRN Génesis Maldonado MD     • propranolol  10 mg Oral Q8H PRN Génesis Maldonado MD     • risperiDONE  4 mg Oral HS Génesis Maldonado MD     • senna-docusate sodium  1 tablet Oral Daily PRN Génesis Maldonado MD     • traZODone  50 mg Oral HS PRN Génesis Maldonado MD         Behavioral Health Medications: all current active meds have been reviewed. Changes as in plan section above. Laboratory results:  I have personally reviewed all pertinent laboratory/tests results. No results found for this or any previous visit (from the past 48 hour(s)).      Adrian Marquez

## 2023-08-16 NOTE — NURSING NOTE
Pt received belongings from family including an electric toothbrush and bar soap. Explained unit policies to pt and pt is accepting of not getting the electric toothbrush but would like supervisors to consider allowing him to use the bar soap. Again explained unit policies but pt would still like to speak to them about this tomorrow. - Monitor for any S&S of Diverticulitis.

## 2023-08-16 NOTE — NURSING NOTE
Pt is more pleasant and socially appropriate with peers and staff. Able to wait appropriately to have needs met. Staff present for pt to shave. Medication and meal compliant, less suspicious of medications. Denies SI/HI/AH/VH at this time. Denies any unmet needs or complaints at this time.

## 2023-08-16 NOTE — PROGRESS NOTES
08/16/23 0900   Team Meeting   Meeting Type Daily Rounds   Team Members Present   Team Members Present Physician;Nurse;   Physician Team Member [facility] Team Member [facility] Team Member Candido   Patient/Family Present   Patient Present No   Patient's Family Present No     Pt med/meal compliant. Appears less irritable. Required redirection from staff and was receptive to this. Discharge possible next week.

## 2023-08-16 NOTE — PROGRESS NOTES
08/16/23 1000   Activity/Group Checklist   Group Other (Comment)  (Group Art Therapy/Psychodynamic, Visual Introduction)   Attendance Attended   Attendance Duration (min) Greater than 60   Interactions Interacted appropriately   Affect/Mood Appropriate   Goals Achieved Discussed coping strategies; Able to listen to others; Able to engage in interactions; Able to recieve feedback; Able to give feedback to another  (Able to engage materials and directive; full participation)

## 2023-08-16 NOTE — PLAN OF CARE
Problem: Alteration in Thoughts and Perception  Goal: Treatment Goal: Gain control of psychotic behaviors/thinking, reduce/eliminate presenting symptoms and demonstrate improved reality functioning upon discharge  Outcome: Progressing  Goal: Verbalize thoughts and feelings  Description: Interventions:  - Promote a nonjudgmental and trusting relationship with the patient through active listening and therapeutic communication  - Assess patient's level of functioning, behavior and potential for risk  - Engage patient in 1 on 1 interactions  - Encourage patient to express fears, feelings, frustrations, and discuss symptoms    - [place] patient to reality, help patient recognize reality-based thinking   - Administer medications as ordered and assess for potential side effects  - Provide the patient education related to the signs and symptoms of the illness and desired effects of prescribed medications  Outcome: Progressing  Goal: Refrain from acting on delusional thinking/internal stimuli  Description: Interventions:  - Monitor patient closely, per order   - Utilize least restrictive measures   - Set reasonable limits, give positive feedback for acceptable   - Administer medications as ordered and monitor of potential side effects  Outcome: Progressing  Goal: Agree to be compliant with medication regime, as prescribed and report medication side effects  Description: Interventions:  - Offer appropriate PRN medication and supervise ingestion; conduct AIMS, as needed   Outcome: Progressing  Goal: Attend and participate in unit activities, including therapeutic, recreational, and educational groups  Description: Interventions:  -Encourage Visitation and family involvement in care  Outcome: Progressing  Goal: Recognize dysfunctional thoughts, communicate reality-based thoughts at the time of discharge  Description: Interventions:  - Provide medication and psycho-education to assist patient in compliance and developing insight into his/her illness   Outcome: Progressing  Goal: Complete daily ADLs, including personal hygiene independently, as able  Description: Interventions:  - Observe, teach, and assist patient with ADLS  - Monitor and promote a balance of rest/activity, with adequate nutrition and elimination   Outcome: Progressing     Problem: Ineffective Coping  Goal: Cooperates with admission process  Description: Interventions:   - Complete admission process  Outcome: Progressing  Goal: Identifies ineffective coping skills  Outcome: Progressing  Goal: Identifies healthy coping skills  Outcome: Progressing  Goal: Demonstrates healthy coping skills  Outcome: Progressing  Goal: Participates in unit activities  Description: Interventions:  - Provide therapeutic environment   - Provide required programming   - Redirect inappropriate behaviors   Outcome: Progressing     Problem: INVOLUNTARY ADMIT  Goal: Will cooperate with staff recommendations and doctor's orders and will demonstrate appropriate behavior  Description: INTERVENTIONS:  - Treat underlying conditions and offer medication as ordered  - Educate regarding involuntary admission procedures and rules  - Utilize positive consistent limit setting strategies to support patient and staff safety  Outcome: Progressing

## 2023-08-16 NOTE — NURSING NOTE
Pt calm and cooperative, Pt pleasant upon approach, Pt visible on the unit seen talking with peers, Pt didn't spend much time with peers he went back to his room after snack and wait for meds so he could go to bed, Pt denied all pain anxiety and depression, Pt denies HI and SI , Pt denied AH and VH, Pt took all scheduled HS meds, Q7 min safety checks maintained

## 2023-08-16 NOTE — CONSULTS
Nursing sent me a picture of patient's foot. Patient has dry skin, recommend ordering Amlactin to be applied daily. Pain pt is feeling likely from pressure on ball of the foot from being without foot wear. Recommend Meloxicam 15 mg daily x 3 weeks.  Recommend ambulating with shoe gear for proper support      Pt to be seen as outpt

## 2023-08-17 PROCEDURE — 99232 SBSQ HOSP IP/OBS MODERATE 35: CPT | Performed by: STUDENT IN AN ORGANIZED HEALTH CARE EDUCATION/TRAINING PROGRAM

## 2023-08-17 RX ADMIN — CLONIDINE HYDROCHLORIDE 0.2 MG: 0.1 TABLET ORAL at 08:16

## 2023-08-17 RX ADMIN — RISPERIDONE 4 MG: 2 TABLET ORAL at 21:32

## 2023-08-17 RX ADMIN — CLONIDINE HYDROCHLORIDE 0.2 MG: 0.1 TABLET ORAL at 20:16

## 2023-08-17 RX ADMIN — OMEGA-3 FATTY ACIDS CAP 1000 MG 1000 MG: 1000 CAP at 08:15

## 2023-08-17 NOTE — NURSING NOTE
Pt denies active SI, HI and A/V hallucinations at this present time. Was compliant with meds, has irritable edge, demanding with staff; displaying nonspecific paranoia and preoccupation. Constricted and guarded on approach, good eye contact, judgement is to be determined, concrete communication to assessment questions.

## 2023-08-17 NOTE — PROGRESS NOTES
Progress Note - [address] 41 y.o. male MRN: [id number]  Unit/Bed#: [facility] 345-02 Encounter: [id number]    Assessment/Plan   Principal Problem:    Schizoaffective disorder, bipolar type with good prognostic features ([address])  Active Problems:    Alcohol abuse, continuous    Cannabis dependence, continuous ([address])    Medical clearance for psychiatric admission    H/O medication noncompliance      Recommended Treatment:   1. Continue risperidone 4 mg nightly. 2. Transitioning to [facility], received loading dose of 234 mg yesterday. Patient to receive booster dose of 156 mg on Monday, 8/21/2023. 3. Patient is currently on a 303 involuntary commitment. Continue with group therapy, milieu therapy and occupational therapy. Continue frequent safety checks and vitals per unit protocol. Case discussed with treatment team.  Risks, benefits and possible side effects of Medications: Risks, benefits, and possible side effects of medications have been explained to the patient, who verbalizes understanding    ------------------------------------------------------------    Subjective: Per nursing report, Lorenza Hernandez has been calm and cooperative on the unit and compliant with scheduled medications. Lorenza Hernandez was seen in his doorway for follow-up. He was calm and cooperative. He reports his mood is "good" and his appetite and sleep are adequate. He has no issues at this time with his medications. He asked the plan for the maintenance dose of Invega and was informed it would be administered Monday. Denies SI/HI/AVH at this time. Progress Toward Goals: Some improvement    Psychiatric Review of Systems:  Behavior over the last 24 hours: improved  Sleep: normal  Appetite: adequate  Medication side effects: none verbalized  ROS: Complete review of systems is negative except as noted above.     Vital signs in last 24 hours:  Temp:  [97.3 °F (36.3 °C)-98.3 °F (36.8 °C)] 97.3 °F (36.3 °C)  HR:  [] 95  Resp:  [16] 16  BP: (147-162)/(81-98) 162/98    Mental Status Exam:  Appearance:  alert, good eye contact, appears stated age, casually dressed, appropriate grooming and hygiene and tattooed   Behavior:  calm, cooperative and guarded   Motor: no abnormal movements and normal gait and balance   Speech:  spontaneous, clear, scant and coherent   Mood:  "well"   Affect:  constricted   Thought Process:  Organized, logical, goal-directed   Thought Content: no verbalized delusions or overt paranoia   Perceptual disturbances: no reported hallucinations and does not appear to be responding to internal stimuli at this time   Risk Potential: No active or passive suicidal or homicidal ideation was verbalized during interview   Cognition: oriented to person, place, time, and situation, appears to be of average intelligence and cognition not formally tested   Insight:  Improving   Judgment: Improving     Current Medications:  Current Facility-Administered Medications   Medication Dose Route Frequency Provider Last Rate   • acetaminophen  650 mg Oral Q6H PRN Cecilio Restrepo MD     • aluminum-magnesium hydroxide-simethicone  30 mL Oral Q4H PRN Cecilio Restrepo MD     • benztropine  1 mg Intramuscular Q4H PRN Max 6/day Cecilio Restrepo MD     • benztropine  1 mg Oral Q4H PRN Max 6/day Cecilio Restrepo MD     • cloNIDine  0.2 mg Oral Q12H [address]     • hydrOXYzine HCL  50 mg Oral Q6H PRN Max 4/day Cecilio Restrepo MD      Or   • diphenhydrAMINE  50 mg Intramuscular Q6H PRN Cecilio Restrepo MD     • fish oil  1,000 mg Oral Daily Marcell Canavan, CRNP     • glycerin-hypromellose-  1 drop Both Eyes Q3H PRN Cecilio Restrepo MD     • hydrOXYzine HCL  100 mg Oral Q6H PRN Max 4/day Cecilio Restrepo MD      Or   • LORazepam  2 mg Intramuscular Q6H PRN Cecilio Restrepo MD     • hydrOXYzine HCL  25 mg Oral Q6H PRN Max 4/day Cecilio Restrepo MD     • ibuprofen  600 mg Oral Q8H PRN LISY Solorzano     • OLANZapine  10 mg Oral Q3H PRN Max 3/day John Escoto MD      Or   • OLANZapine  10 mg Intramuscular Q3H PRN Max 3/day John Escoto MD     • OLANZapine  5 mg Oral Q3H PRN Max 6/day John Escoto MD      Or   • OLANZapine  5 mg Intramuscular Q3H PRN Max 6/day John Escoto MD     • OLANZapine  2.5 mg Oral Q3H PRN Max 8/day John Escoto MD     • polyethylene glycol  17 g Oral Daily PRN John Escoto MD     • propranolol  10 mg Oral Q8H PRN John Escoto MD     • risperiDONE  4 mg Oral HS John Escoto MD     • senna-docusate sodium  1 tablet Oral Daily PRN John Escoto MD     • traZODone  50 mg Oral HS PRN John Escoto MD         Behavioral Health Medications: all current active meds have been reviewed. Changes as in plan section above. Laboratory results:  I have personally reviewed all pertinent laboratory/tests results. No results found for this or any previous visit (from the past 48 hour(s)).      Pippa Tinoco

## 2023-08-17 NOTE — NURSING NOTE
Pt calm and cooperative, Pt pleasant upon approach, Pt visible on the unit seen on the phone and talking with peers, Pt paranoid and asked to inspect the medication in its package before the RN scanned them and gave them to him, PT stated "Everything looks good I will take them now",  Pt denied all pain anxiety and depression, Pt denies HI and SI , Pt denied AH and VH, Pt took all scheduled HS meds, Q7 min safety checks maintained

## 2023-08-17 NOTE — PROGRESS NOTES
08/17/23 0902   Team Meeting   Meeting Type Daily Rounds   Team Members Present   Team Members Present Physician;Nurse;   Physician Team Member [facility]   Nursing Team Member [facility] Management Team Member Candido   Patient/Family Present   Patient Present No   Patient's Family Present No     Pt is med/meal compliant. Pt appears paranoid, inspecting medication package. Denying symptoms. Received initial RIVERS yesterday.

## 2023-08-17 NOTE — PLAN OF CARE
Problem: Alteration in Thoughts and Perception  Goal: Treatment Goal: Gain control of psychotic behaviors/thinking, reduce/eliminate presenting symptoms and demonstrate improved reality functioning upon discharge  Outcome: Progressing  Goal: Verbalize thoughts and feelings  Description: Interventions:  - Promote a nonjudgmental and trusting relationship with the patient through active listening and therapeutic communication  - Assess patient's level of functioning, behavior and potential for risk  - Engage patient in 1 on 1 interactions  - Encourage patient to express fears, feelings, frustrations, and discuss symptoms    - [facility] patient to reality, help patient recognize reality-based thinking   - Administer medications as ordered and assess for potential side effects  - Provide the patient education related to the signs and symptoms of the illness and desired effects of prescribed medications  Outcome: Progressing  Goal: Refrain from acting on delusional thinking/internal stimuli  Description: Interventions:  - Monitor patient closely, per order   - Utilize least restrictive measures   - Set reasonable limits, give positive feedback for acceptable   - Administer medications as ordered and monitor of potential side effects  Outcome: Progressing  Goal: Agree to be compliant with medication regime, as prescribed and report medication side effects  Description: Interventions:  - Offer appropriate PRN medication and supervise ingestion; conduct AIMS, as needed   Outcome: Progressing  Goal: Attend and participate in unit activities, including therapeutic, recreational, and educational groups  Description: Interventions:  -Encourage Visitation and family involvement in care  Outcome: Progressing  Goal: Recognize dysfunctional thoughts, communicate reality-based thoughts at the time of discharge  Description: Interventions:  - Provide medication and psycho-education to assist patient in compliance and developing insight into his/her illness   Outcome: Progressing  Goal: Complete daily ADLs, including personal hygiene independently, as able  Description: Interventions:  - Observe, teach, and assist patient with ADLS  - Monitor and promote a balance of rest/activity, with adequate nutrition and elimination   Outcome: Progressing     Problem: Ineffective Coping  Goal: Cooperates with admission process  Description: Interventions:   - Complete admission process  Outcome: Progressing  Goal: Identifies ineffective coping skills  Outcome: Progressing  Goal: Identifies healthy coping skills  Outcome: Progressing  Goal: Demonstrates healthy coping skills  Outcome: Progressing  Goal: Participates in unit activities  Description: Interventions:  - Provide therapeutic environment   - Provide required programming   - Redirect inappropriate behaviors   Outcome: Progressing     Problem: INVOLUNTARY ADMIT  Goal: Will cooperate with staff recommendations and doctor's orders and will demonstrate appropriate behavior  Description: INTERVENTIONS:  - Treat underlying conditions and offer medication as ordered  - Educate regarding involuntary admission procedures and rules  - Utilize positive consistent limit setting strategies to support patient and staff safety  Outcome: Progressing     Problem: DISCHARGE PLANNING  Goal: Discharge to home or other facility with appropriate resources  Description: INTERVENTIONS:  - Identify barriers to discharge w/patient and caregiver  - Arrange for needed discharge resources and transportation as appropriate  - Identify discharge learning needs (meds, wound care, etc.)  - Arrange for interpretive services to assist at discharge as needed  - Refer to Case Management Department for coordinating discharge planning if the patient needs post-hospital services based on physician/advanced practitioner order or complex needs related to functional status, cognitive ability, or social support system  Outcome: Progressing

## 2023-08-17 NOTE — TREATMENT TEAM
08/17/23 0959   Activity/Group Checklist   Group Community meeting   Attendance Attended   Attendance Duration (min) 16-30   Interactions Interacted appropriately   Affect/Mood Appropriate   Goals Achieved Able to listen to others; Able to engage in interactions; Able to self-disclose; Able to recieve feedback

## 2023-08-18 PROCEDURE — 99232 SBSQ HOSP IP/OBS MODERATE 35: CPT | Performed by: STUDENT IN AN ORGANIZED HEALTH CARE EDUCATION/TRAINING PROGRAM

## 2023-08-18 RX ORDER — CLONIDINE HYDROCHLORIDE 0.1 MG/1
0.2 TABLET ORAL ONCE
Status: COMPLETED | OUTPATIENT
Start: 2023-08-18 | End: 2023-08-18

## 2023-08-18 RX ADMIN — CLONIDINE HYDROCHLORIDE 0.2 MG: 0.1 TABLET ORAL at 18:06

## 2023-08-18 RX ADMIN — CLONIDINE HYDROCHLORIDE 0.2 MG: 0.1 TABLET ORAL at 08:11

## 2023-08-18 RX ADMIN — RISPERIDONE 4 MG: 2 TABLET ORAL at 21:53

## 2023-08-18 RX ADMIN — OMEGA-3 FATTY ACIDS CAP 1000 MG 1000 MG: 1000 CAP at 08:11

## 2023-08-18 RX ADMIN — CLONIDINE HYDROCHLORIDE 0.2 MG: 0.1 TABLET ORAL at 20:19

## 2023-08-18 NOTE — PLAN OF CARE
Problem: Alteration in Thoughts and Perception  Goal: Treatment Goal: Gain control of psychotic behaviors/thinking, reduce/eliminate presenting symptoms and demonstrate improved reality functioning upon discharge  Outcome: Progressing  Goal: Verbalize thoughts and feelings  Description: Interventions:  - Promote a nonjudgmental and trusting relationship with the patient through active listening and therapeutic communication  - Assess patient's level of functioning, behavior and potential for risk  - Engage patient in 1 on 1 interactions  - Encourage patient to express fears, feelings, frustrations, and discuss symptoms    - Ava patient to reality, help patient recognize reality-based thinking   - Administer medications as ordered and assess for potential side effects  - Provide the patient education related to the signs and symptoms of the illness and desired effects of prescribed medications  Outcome: Progressing  Goal: Refrain from acting on delusional thinking/internal stimuli  Description: Interventions:  - Monitor patient closely, per order   - Utilize least restrictive measures   - Set reasonable limits, give positive feedback for acceptable   - Administer medications as ordered and monitor of potential side effects  Outcome: Progressing  Goal: Agree to be compliant with medication regime, as prescribed and report medication side effects  Description: Interventions:  - Offer appropriate PRN medication and supervise ingestion; conduct AIMS, as needed   Outcome: Progressing  Goal: Attend and participate in unit activities, including therapeutic, recreational, and educational groups  Description: Interventions:  -Encourage Visitation and family involvement in care  Outcome: Progressing  Goal: Recognize dysfunctional thoughts, communicate reality-based thoughts at the time of discharge  Description: Interventions:  - Provide medication and psycho-education to assist patient in compliance and developing insight into his/her illness   Outcome: Progressing  Goal: Complete daily ADLs, including personal hygiene independently, as able  Description: Interventions:  - Observe, teach, and assist patient with ADLS  - Monitor and promote a balance of rest/activity, with adequate nutrition and elimination   Outcome: Progressing     Problem: Ineffective Coping  Goal: Cooperates with admission process  Description: Interventions:   - Complete admission process  Outcome: Progressing  Goal: Identifies ineffective coping skills  Outcome: Progressing  Goal: Identifies healthy coping skills  Outcome: Progressing  Goal: Demonstrates healthy coping skills  Outcome: Progressing  Goal: Participates in unit activities  Description: Interventions:  - Provide therapeutic environment   - Provide required programming   - Redirect inappropriate behaviors   Outcome: Progressing     Problem: INVOLUNTARY ADMIT  Goal: Will cooperate with staff recommendations and doctor's orders and will demonstrate appropriate behavior  Description: INTERVENTIONS:  - Treat underlying conditions and offer medication as ordered  - Educate regarding involuntary admission procedures and rules  - Utilize positive consistent limit setting strategies to support patient and staff safety  Outcome: Progressing     Problem: DISCHARGE PLANNING  Goal: Discharge to home or other facility with appropriate resources  Description: INTERVENTIONS:  - Identify barriers to discharge w/patient and caregiver  - Arrange for needed discharge resources and transportation as appropriate  - Identify discharge learning needs (meds, wound care, etc.)  - Arrange for interpretive services to assist at discharge as needed  - Refer to Case Management Department for coordinating discharge planning if the patient needs post-hospital services based on physician/advanced practitioner order or complex needs related to functional status, cognitive ability, or social support system  Outcome: Progressing

## 2023-08-18 NOTE — NURSING NOTE
SLIM contacted about patient's HTN. Patient ordered 1x dose of .2mg of clonidine. Patient reluctant and paranoid of medication, but ultimately agreed. Patient informed it is just for one time, and that he will be receiving his bed time dose tonight as well.

## 2023-08-18 NOTE — NURSING NOTE
Patient presents with a superficially pleasant, but underlying intense, irritable edge present. Patient making jokes today and improved sense of humor. Patient is visible, cooperative, and medication compliant. Patient denies AVH/SI/HI.

## 2023-08-18 NOTE — NURSING NOTE
Pt cooperative, Pt pleasant upon approach, Pt visible on the unit seen on the talking with peers, Pt had to be redirected many times when PT was making a peer on the unit hold their hand up in the air and  tried to kick it.  Pt became slightly agitated but calmed down after talking with RN, Pt denied all pain anxiety and depression, Pt denies HI and SI , Pt denied AH and VH, Pt took all scheduled HS meds, Pt is paranoid about the medication packaging and asks to inspect them before nurse gives  him the medication, Q7 min safety checks maintained

## 2023-08-18 NOTE — PROGRESS NOTES
08/18/23 0853   Team Meeting   Meeting Type Daily Rounds   Team Members Present   Team Members Present Physician;Nurse;   Physician Team Member [facility] Team Member Candido   Patient/Family Present   Patient Present No   Patient's Family Present No   Pt med/meal compliant. Social with peers. Redirected from kicking in the hallway and became irritable. Inspecting medication package prior to taking it. Discharge pending for next week.

## 2023-08-18 NOTE — PROGRESS NOTES
Progress Note - [address] 41 y.o. male MRN: [id number]  Unit/Bed#: [facility] 345-02 Encounter: [id number]    Assessment/Plan   Principal Problem:    Schizoaffective disorder, bipolar type with good prognostic features ([address])  Active Problems:    Alcohol abuse, continuous    Cannabis dependence, continuous ([address])    Medical clearance for psychiatric admission    H/O medication noncompliance      Recommended Treatment:   1. Continue risperidone 4 mg nightly. 2. Transitioning to [facility], received loading dose of 234 mg.  Patient to receive booster dose of 156 mg on Monday, 8/21/2023. 3. Patient is currently on a 303 involuntary commitment. Continue with group therapy, milieu therapy and occupational therapy. Continue frequent safety checks and vitals per unit protocol. Case discussed with treatment team.  Risks, benefits and possible side effects of Medications: Risks, benefits, and possible side effects of medications have been explained to the patient, who verbalizes understanding    ------------------------------------------------------------    Subjective: Per nursing report, Judy Reynaga has been calm and cooperative on the unit and compliant with scheduled medications. Judy Reynaga was seen in his doorway for follow-up. Today he remains guarded and scant. He reports his mood today is "fine" and he slept "so-so". He says he had to take a shower in order to get some sleep. He reports a good appetite. Judy Reynaga reports some irritation regarding some of his peers and says he does not want people in his room. He is also requesting to speak with a . Patient reports no issues with his medications, but again requests the Invega booster dose to be given sooner. It was explained again that the booster would be given Monday. Patient expressed superficial understanding. Denies SI/HI/AVH at this time.      Progress Toward Goals: Slow impovement    Psychiatric Review of Systems:  Behavior over the last 24 hours: unchanged  Sleep: difficulty falling asleep  Appetite: adequate  Medication side effects: none verbalized  ROS: Complete review of systems is negative except as noted above.     Vital signs in last 24 hours:  Temp:  [97.4 °F (36.3 °C)-97.9 °F (36.6 °C)] 97.9 °F (36.6 °C)  HR:  [103-118] 114  Resp:  [16-18] 18  BP: (141-181)/(83-96) 141/91    Mental Status Exam:  Appearance:  alert, good eye contact, appears stated age, casually dressed and appropriate grooming and hygiene   Behavior:  calm, cooperative and guarded   Motor: no abnormal movements and normal gait and balance   Speech:  spontaneous, clear, scant and coherent   Mood:  "fine"   Affect:  constricted   Thought Process:  Organized, logical, goal-directed   Thought Content: no verbalized delusions or overt paranoia   Perceptual disturbances: no reported hallucinations and does not appear to be responding to internal stimuli at this time   Risk Potential: No active or passive suicidal or homicidal ideation was verbalized during interview   Cognition: oriented to person, place, time, and situation, appears to be of average intelligence and cognition not formally tested   Insight:  Improving   Judgment: Improving     Current Medications:  Current Facility-Administered Medications   Medication Dose Route Frequency Provider Last Rate   • acetaminophen  650 mg Oral Q6H PRN Citlali Hanna MD     • aluminum-magnesium hydroxide-simethicone  30 mL Oral Q4H PRN Citlali Hanna MD     • benztropine  1 mg Intramuscular Q4H PRN Max 6/day Citlali Hanna MD     • benztropine  1 mg Oral Q4H PRN Max 6/day Citlali Hanna MD     • cloNIDine  0.2 mg Oral Q12H [address]     • hydrOXYzine HCL  50 mg Oral Q6H PRN Max 4/day Citlali Hanna MD      Or   • diphenhydrAMINE  50 mg Intramuscular Q6H PRN Citlali Hanna MD     • fish oil  1,000 mg Oral Daily LISY Early     • glycerin-hypromellose-PEG 400  1 drop Both Eyes Q3H PRN Clarence Hicks MD     • hydrOXYzine HCL  100 mg Oral Q6H PRN Max 4/day Clarence Hicks MD      Or   • LORazepam  2 mg Intramuscular Q6H PRN Clarence Hicks MD     • hydrOXYzine HCL  25 mg Oral Q6H PRN Max 4/day Clarence Hicks MD     • ibuprofen  600 mg Oral Q8H PRN LISY Garcia     • OLANZapine  10 mg Oral Q3H PRN Max 3/day Clarence Hicks MD      Or   • OLANZapine  10 mg Intramuscular Q3H PRN Max 3/day Clarence Hicks MD     • OLANZapine  5 mg Oral Q3H PRN Max 6/day Clarence Hicks MD      Or   • OLANZapine  5 mg Intramuscular Q3H PRN Max 6/day Clarence Hicks MD     • OLANZapine  2.5 mg Oral Q3H PRN Max 8/day Clarence Hicks MD     • polyethylene glycol  17 g Oral Daily PRN Clarence Hicks MD     • propranolol  10 mg Oral Q8H PRN Clarence Hicks MD     • risperiDONE  4 mg Oral HS Clarence Hicks MD     • senna-docusate sodium  1 tablet Oral Daily PRN Clarence Hicks MD     • traZODone  50 mg Oral HS PRN Clarence Hicks MD         Behavioral Health Medications: all current active meds have been reviewed. Changes as in plan section above. Laboratory results:  I have personally reviewed all pertinent laboratory/tests results. No results found for this or any previous visit (from the past 48 hour(s)).      [place]

## 2023-08-18 NOTE — TREATMENT TEAM
08/18/23 0012   Activity/Group Checklist   Group Community meeting   Attendance Attended   Attendance Duration (min) 16-30   Interactions Interacted appropriately   Affect/Mood Appropriate   Goals Achieved Able to listen to others; Able to engage in interactions; Able to self-disclose; Able to recieve feedback

## 2023-08-18 NOTE — SOCIAL WORK
Pt visible on the unit at times. When approached by SW, pt pleasant and cooperative. Stating he is having a good day. Pt denying any CM related needs at this time.

## 2023-08-19 PROBLEM — Z00.8 MEDICAL CLEARANCE FOR PSYCHIATRIC ADMISSION: Status: RESOLVED | Noted: 2023-08-12 | Resolved: 2023-08-19

## 2023-08-19 PROBLEM — F10.90 ALCOHOL USE DISORDER: Status: ACTIVE | Noted: 2018-10-01

## 2023-08-19 PROCEDURE — 99232 SBSQ HOSP IP/OBS MODERATE 35: CPT | Performed by: PSYCHIATRY & NEUROLOGY

## 2023-08-19 RX ORDER — CLONIDINE HYDROCHLORIDE 0.1 MG/1
0.2 TABLET ORAL EVERY 12 HOURS SCHEDULED
Status: DISCONTINUED | OUTPATIENT
Start: 2023-08-19 | End: 2023-08-22 | Stop reason: HOSPADM

## 2023-08-19 RX ORDER — AMLODIPINE BESYLATE 5 MG/1
5 TABLET ORAL DAILY
Status: DISCONTINUED | OUTPATIENT
Start: 2023-08-20 | End: 2023-08-19

## 2023-08-19 RX ORDER — CLONIDINE HYDROCHLORIDE 0.1 MG/1
0.2 TABLET ORAL EVERY 12 HOURS SCHEDULED
Status: DISCONTINUED | OUTPATIENT
Start: 2023-08-19 | End: 2023-08-19

## 2023-08-19 RX ORDER — AMLODIPINE BESYLATE 5 MG/1
5 TABLET ORAL DAILY
Status: DISCONTINUED | OUTPATIENT
Start: 2023-08-19 | End: 2023-08-19

## 2023-08-19 RX ORDER — CLONIDINE HYDROCHLORIDE 0.1 MG/1
0.2 TABLET ORAL EVERY 12 HOURS PRN
Status: DISCONTINUED | OUTPATIENT
Start: 2023-08-19 | End: 2023-08-19

## 2023-08-19 RX ADMIN — OMEGA-3 FATTY ACIDS CAP 1000 MG 1000 MG: 1000 CAP at 08:26

## 2023-08-19 RX ADMIN — CLONIDINE HYDROCHLORIDE 0.2 MG: 0.1 TABLET ORAL at 08:26

## 2023-08-19 RX ADMIN — RISPERIDONE 4 MG: 2 TABLET ORAL at 21:00

## 2023-08-19 RX ADMIN — CLONIDINE HYDROCHLORIDE 0.2 MG: 0.1 TABLET ORAL at 21:00

## 2023-08-19 NOTE — NURSING NOTE
Patient is visible on unit, socializing with select peers, observed laughing and making jokes. Patient is calm, cooperative, and pleasant on approach. He denies current Si/HI/AVH. Patient is compliant with medications and meals. No complaints or unmet needs identified at this time.

## 2023-08-19 NOTE — PLAN OF CARE
Problem: Alteration in Thoughts and Perception  Goal: Treatment Goal: Gain control of psychotic behaviors/thinking, reduce/eliminate presenting symptoms and demonstrate improved reality functioning upon discharge  Outcome: Progressing  Goal: Verbalize thoughts and feelings  Description: Interventions:  - Promote a nonjudgmental and trusting relationship with the patient through active listening and therapeutic communication  - Assess patient's level of functioning, behavior and potential for risk  - Engage patient in 1 on 1 interactions  - Encourage patient to express fears, feelings, frustrations, and discuss symptoms    - [place] patient to reality, help patient recognize reality-based thinking   - Administer medications as ordered and assess for potential side effects  - Provide the patient education related to the signs and symptoms of the illness and desired effects of prescribed medications  Outcome: Progressing  Goal: Refrain from acting on delusional thinking/internal stimuli  Description: Interventions:  - Monitor patient closely, per order   - Utilize least restrictive measures   - Set reasonable limits, give positive feedback for acceptable   - Administer medications as ordered and monitor of potential side effects  Outcome: Progressing  Goal: Agree to be compliant with medication regime, as prescribed and report medication side effects  Description: Interventions:  - Offer appropriate PRN medication and supervise ingestion; conduct AIMS, as needed   Outcome: Progressing  Goal: Attend and participate in unit activities, including therapeutic, recreational, and educational groups  Description: Interventions:  -Encourage Visitation and family involvement in care  Outcome: Progressing  Goal: Recognize dysfunctional thoughts, communicate reality-based thoughts at the time of discharge  Description: Interventions:  - Provide medication and psycho-education to assist patient in compliance and developing insight into his/her illness   Outcome: Progressing  Goal: Complete daily ADLs, including personal hygiene independently, as able  Description: Interventions:  - Observe, teach, and assist patient with ADLS  - Monitor and promote a balance of rest/activity, with adequate nutrition and elimination   Outcome: Progressing     Problem: Ineffective Coping  Goal: Cooperates with admission process  Description: Interventions:   - Complete admission process  Outcome: Progressing  Goal: Identifies ineffective coping skills  Outcome: Progressing  Goal: Identifies healthy coping skills  Outcome: Progressing  Goal: Demonstrates healthy coping skills  Outcome: Progressing  Goal: Participates in unit activities  Description: Interventions:  - Provide therapeutic environment   - Provide required programming   - Redirect inappropriate behaviors   Outcome: Progressing     Problem: INVOLUNTARY ADMIT  Goal: Will cooperate with staff recommendations and doctor's orders and will demonstrate appropriate behavior  Description: INTERVENTIONS:  - Treat underlying conditions and offer medication as ordered  - Educate regarding involuntary admission procedures and rules  - Utilize positive consistent limit setting strategies to support patient and staff safety  Outcome: Progressing     Problem: DISCHARGE PLANNING  Goal: Discharge to home or other facility with appropriate resources  Description: INTERVENTIONS:  - Identify barriers to discharge w/patient and caregiver  - Arrange for needed discharge resources and transportation as appropriate  - Identify discharge learning needs (meds, wound care, etc.)  - Arrange for interpretive services to assist at discharge as needed  - Refer to Case Management Department for coordinating discharge planning if the patient needs post-hospital services based on physician/advanced practitioner order or complex needs related to functional status, cognitive ability, or social support system  Outcome: Progressing

## 2023-08-19 NOTE — PROGRESS NOTES
Progress Note - [address] 41 y.o. male MRN: [id number]  Unit/Bed#: [facility] 345-02 Encounter: [id number]    Assessment/Plan   Principal Problem:    Schizoaffective disorder, bipolar type with good prognostic features ([address])  Active Problems:    Alcohol abuse, continuous    Cannabis dependence, continuous ([address])    Medical clearance for psychiatric admission    H/O medication noncompliance      Recommended Treatment:     1. No psychopharmacologic changes necessary at this time; will continue to assess for further optimization. 2. Continue with current medications:  a. Leona Gregg Sustenna  mg booster dose to be administered on Monday, 8/21/2023 for psychosis and mood symptoms. b. Risperdal 4 mg at bedtime for psychosis and mood symptoms. 3. Continue with group therapy, milieu therapy and occupational therapy. 4. Continue frequent safety checks and vitals per unit protocol. 5. Continue with SLIM medical management as indicated  6. Continue coordinating with case management regarding disposition    Legal Status: 303  Disposition: To be determined, coordinating with case management    Case discussed with treatment team.  Risks, benefits and possible side effects of Medications: Risks, benefits, and possible side effects of medications have previously been explained. No new medications at this time. ------------------------------------------------------------    Subjective: Patient's chart was reviewed, and patient's progress and plan was discussed with treatment team. Per nursing report, Magdalene Hager has been visible, pleasant, cooperative, seen socializing with peers appropriately and cooperative on the unit and compliant with medications. Patient has remained in behavioral control for the last 24 hours. Last night patient was documented to have slept throughout the night. Magdalene Hager was evaluated this morning for continuity of care.  On examination, Magdalene Hager is calm, pleasant, superficial, dressed casually and sitting comfortably. He states his mood is " pretty good, looking forward to getting this next shot and getting out of here." He reports sleeping well, denying any difficulty falling asleep, staying asleep, or any nightmares, endorsing around 6 hours of uninterrupted sleep which is around his norm as he is a  and is used to getting up very early in the morning and his energy level today is good. His appetite has been good and he endorses eating breakfast and dinner last night without difficulty. He denies any adverse effects from medications and is still hypertensive though he will be starting amlodipine and he is currently asymptomatic. His goals for today are to remain in behavioral control, medication compliant, and continue working toward discharge. Today, Deja Prado is abilio for safety on the unit. He denies suicidal ideation, homicidal ideation, auditory hallucinations, and visual hallucinations. He does have some Scientology preoccupation on interview. Of note, the patient states he does not have explicit homicidal ideation though he does have angry and hostile feelings toward others when they attempt to touch him, though he shakes this writer's hand at the beginning of the interview and then again later on the unit. No questions, comments, or concerns at this time though he is perseverative on discharge. VS: Reviewed, Hypertensive systolic 161G, diastolic 69M, otherwise within normal limits    Progress Toward Goals: Slow improvement    Psychiatric Review of Systems:  Behavior over the last 24 hours: improved  Sleep: normal  Appetite: adequate  Medication side effects: none verbalized  Medical ROS: Complete review of systems is negative except as noted above.     Vital signs in last 24 hours:  Temp:  [97 °F (36.1 °C)-97.7 °F (36.5 °C)] 97 °F (36.1 °C)  HR:  [] 89  Resp:  [16-18] 16  BP: (141-160)/(91-98) 153/94    Mental Status Exam:    Appearance:  alert, good eye contact, appears stated age, casually dressed, appropriate grooming and hygiene and obese   Behavior:  calm, sitting comfortably and Superficially cooperative, holding both of his hands up in front of him with open palms   Speech:  spontaneous, clear, normal rate, normal volume and coherent   Mood:  "Pretty good, looking forward to getting this next shot"   Affect:  constricted, mood-congruent   Thought Process:  Organized, logical, goal-directed   Associations: intact associations   Thought Content:  no verbalized delusions or overt paranoia   Perceptual Disturbances: no reported hallucinations and does not appear to be responding to internal stimuli at this time   Risk Potential: Suicidal ideation - None at present  Homicidal ideation - None at present  Potential for aggression - Not at present   Sensorium:  oriented to person, place and time/date   Memory:  recent and remote memory grossly intact   Consciousness:  alert and awake   Attention/Concentration: attention span and concentration are age appropriate   Insight:  improving   Judgment: improving   Gait/Station: normal gait/station   Motor Activity: no abnormal movements     Current Medications:  Current Facility-Administered Medications   Medication Dose Route Frequency Provider Last Rate   • acetaminophen  650 mg Oral Q6H PRN Lorenzo Elmore MD     • aluminum-magnesium hydroxide-simethicone  30 mL Oral Q4H PRN Lorenzo Elmore MD     • benztropine  1 mg Intramuscular Q4H PRN Max 6/day Lorenzo Elmore MD     • benztropine  1 mg Oral Q4H PRN Max 6/day Lorenzo Elmore MD     • cloNIDine  0.2 mg Oral Q12H [address] LISY Le     • hydrOXYzine HCL  50 mg Oral Q6H PRN Max 4/day Lorenzo Elmore MD      Or   • diphenhydrAMINE  50 mg Intramuscular Q6H PRN Lorenzo Elmore MD     • fish oil  1,000 mg Oral Daily LISY Cook     • glycerin-hypromellose-  1 drop Both Eyes Q3H PRN Lorenzo Elmore MD     • hydrOXYzine HCL  100 mg Oral Q6H PRN Max 4/day Lorenzo Elmore MD      Or   • LORazepam  2 mg Intramuscular Q6H PRN Lorenzo Elmore MD     • hydrOXYzine HCL  25 mg Oral Q6H PRN Max 4/day Lorenzo Elmore MD     • ibuprofen  600 mg Oral Q8H PRN Gaspershiv Reddy, CRNP     • OLANZapine  10 mg Oral Q3H PRN Max 3/day Lorenzo Elmore MD      Or   • OLANZapine  10 mg Intramuscular Q3H PRN Max 3/day Lorenzo Elmore MD     • OLANZapine  5 mg Oral Q3H PRN Max 6/day Lorenzo Elmore MD      Or   • OLANZapine  5 mg Intramuscular Q3H PRN Max 6/day Lorenzo Elmore MD     • OLANZapine  2.5 mg Oral Q3H PRN Max 8/day Lorenzo Elmore MD     • [START ON 8/21/2023] paliperidone  156 mg IM- Deltoid Once Lorenzo Elmore MD     • polyethylene glycol  17 g Oral Daily PRN Lorenzo Elmore MD     • propranolol  10 mg Oral Q8H PRN Lorenzo Elmore MD     • risperiDONE  4 mg Oral HS Lorenzo Elmore MD     • senna-docusate sodium  1 tablet Oral Daily PRN Lorenzo Elmore MD     • traZODone  50 mg Oral HS PRN Lorenzo Elmore MD         Behavioral Health Medications: all current active meds have been reviewed. Changes as in plan section above. Laboratory results:  I have personally reviewed all pertinent laboratory/tests results. No results found for this or any previous visit (from the past 48 hour(s)). This note has been constructed using a voice recognition system. There may be translation, syntax, or grammatical errors. If you have any questions, please contact the dictating author.     Mery Agarwal, DO  Psychiatry Residency, PGY-2

## 2023-08-19 NOTE — NURSING NOTE
Patient has had irritable, labile, paranoid edge today. Patient believes various patients on the unit are intentionally following him despite no indication any patients are. Patient has been argumentative but redirects himself. Patient is compliant and cooperative with medication and denies AVH/SI/HI currently.

## 2023-08-20 PROCEDURE — 99232 SBSQ HOSP IP/OBS MODERATE 35: CPT | Performed by: PSYCHIATRY & NEUROLOGY

## 2023-08-20 RX ADMIN — OMEGA-3 FATTY ACIDS CAP 1000 MG 1000 MG: 1000 CAP at 08:20

## 2023-08-20 RX ADMIN — CLONIDINE HYDROCHLORIDE 0.2 MG: 0.1 TABLET ORAL at 08:20

## 2023-08-20 RX ADMIN — RISPERIDONE 4 MG: 2 TABLET ORAL at 21:27

## 2023-08-20 RX ADMIN — CLONIDINE HYDROCHLORIDE 0.2 MG: 0.1 TABLET ORAL at 21:29

## 2023-08-20 NOTE — NURSING NOTE
Patient isolated to his room; continues to be paranoid and guarded with medication. Patient complaint with HS medication. Denies SI/HI/VH/AH. Denies any unmet needs. Care plan ongoing.

## 2023-08-20 NOTE — PROGRESS NOTES
Progress Note - [address] 41 y.o. male MRN: [id number]  Unit/Bed#: [facility] 345-02 Encounter: [id number]    Assessment/Plan   Principal Problem:    Schizoaffective disorder, bipolar type with good prognostic features ([address])  Active Problems:    Alcohol use disorder    Cannabis dependence, continuous ([address])    H/O medication noncompliance      Recommended Treatment:     1. No psychopharmacologic changes necessary at this time; will continue to assess for further optimization. 2. Continue with current medications:  a. [facility] Sustenna  mg booster dose to be administered on Monday, 8/21/2023 for psychosis and mood symptoms. b. Risperdal 4 mg at bedtime for psychosis and mood symptoms. 3. Continue with group therapy, milieu therapy and occupational therapy. 4. Continue frequent safety checks and vitals per unit protocol. 5. Continue with SLIM medical management as indicated  6. Continue coordinating with case management regarding disposition    Legal Status: 303  Disposition: To be determined, coordinating with case management    Case discussed with treatment team.  Risks, benefits and possible side effects of Medications: Risks, benefits, and possible side effects of medications have previously been explained. No new medications at this time. ------------------------------------------------------------    Subjective: Patient's chart was reviewed, and patient's progress and plan was discussed with treatment team. Per nursing report, Kareem Reynolds has been irritable and slightly paranoid regarding other patients stating that they are following him around the unit and was later noted to be isolated to his room and guarded regarding medications but ultimately cooperative on the unit and compliant with medications. Patient has remained in behavioral control for the last 24 hours. Last night patient was documented to have slept throughout the night.      Kareem Reynolds was evaluated this morning for continuity of care. On examination, Andrew Jameson is calm, pleasant, cooperative, dressed casually and sitting comfortably. He states his mood is "pretty good." He reports sleeping well endorsing around 6 hours uninterrupted sleep, denying any difficulty falling asleep, staying asleep or endorsing any nightmares and his energy level today is good. His appetite has been good and he endorses eating breakfast without difficulty. He denies any adverse effects from medications but states he does not wish to take the amlodipine/Norvasc for his hypertension at this time, he would prefer to take clonidine as he has tried this before and it also helps with his anxiety and OCD symptoms, he was informed this is an appropriate substitution given his comorbidities but that it is imperative for him to continue treatment of his hypertension so as to minimize any future risk of adverse cardiovascular events to which she expressed understanding and was amenable. His goals for today are to remain in behavioral control, medication compliant and continue working on discharge so that he can go to spend some time with his dad and ride his bike and he wishes to continue praying, he relates he needs to continue praying so that he can work on himself and work on his temper so that he can go out and do good things when he leaves the hospital, he endorses significant time reading scripture and is somewhat religiously preoccupied on exam.    Today, Andrew Jameson is abilio for safety on the unit. He denies suicidal ideation, homicidal ideation, auditory hallucinations, and visual hallucinations.   Upon termination of the interview the patient states "the early bird gets the worm."    VS: Reviewed, within normal limits    Progress Toward Goals: Slow improvement    Psychiatric Review of Systems:  Behavior over the last 24 hours: unchanged  Sleep: normal  Appetite: adequate  Medication side effects: none verbalized  Medical ROS: Complete review of systems is negative except as noted above.     Vital signs in last 24 hours:  Temp:  [97.8 °F (36.6 °C)-98.2 °F (36.8 °C)] 97.8 °F (36.6 °C)  HR:  [81-90] 81  Resp:  [16] 16  BP: (144-156)/(78-83) 144/79    Mental Status Exam:    Appearance:  alert, good eye contact, appears stated age, casually dressed, appropriate grooming and hygiene, obese, bearded, smiling and Clean-shaven head   Behavior:  calm, sitting comfortably and Superficially cooperative   Speech:  spontaneous, clear, normal rate, normal volume and coherent   Mood:  "Pretty good"   Affect:  constricted, mood-congruent   Thought Process:  Organized, logical, goal-directed   Associations: intact associations   Thought Content:  paranoid ideation, somatic preoccupation, Hinduism preoccupation   Perceptual Disturbances: no reported hallucinations and does not appear to be responding to internal stimuli at this time   Risk Potential: Suicidal ideation - None at present  Homicidal ideation - None at present  Potential for aggression - Not at present   Sensorium:  oriented to person, place and time/date   Memory:  recent and remote memory grossly intact   Consciousness:  alert and awake   Attention/Concentration: attention span and concentration are age appropriate   Insight:  improving   Judgment: improving   Gait/Station: normal gait/station   Motor Activity: no abnormal movements     Current Medications:  Current Facility-Administered Medications   Medication Dose Route Frequency Provider Last Rate   • acetaminophen  650 mg Oral Q6H PRN Ruben Curtis MD     • aluminum-magnesium hydroxide-simethicone  30 mL Oral Q4H PRN Ruben Curtis MD     • benztropine  1 mg Intramuscular Q4H PRN Max 6/day Ruben Curtis MD     • benztropine  1 mg Oral Q4H PRN Max 6/day Ruben Curtis MD     • cloNIDine  0.2 mg Oral Q12H [facility] LISY Miranda     • hydrOXYzine HCL  50 mg Oral Q6H PRN Max 4/day Ruben Curtis MD      Or   • diphenhydrAMINE  50 mg Intramuscular Q6H PRN Lila Paredes MD     • fish oil  1,000 mg Oral Daily LISY Ramsay     • glycerin-hypromellose-  1 drop Both Eyes Q3H PRN Lila Paredes MD     • hydrOXYzine HCL  100 mg Oral Q6H PRN Max 4/day Lila Paredes MD      Or   • LORazepam  2 mg Intramuscular Q6H PRN Lila Paredes MD     • hydrOXYzine HCL  25 mg Oral Q6H PRN Max 4/day Lila Paredes MD     • ibuprofen  600 mg Oral Q8H PRN LISY Ramsay     • OLANZapine  10 mg Oral Q3H PRN Max 3/day Lila Paredes MD      Or   • OLANZapine  10 mg Intramuscular Q3H PRN Max 3/day Lila Paredes MD     • OLANZapine  5 mg Oral Q3H PRN Max 6/day Lila Paredes MD      Or   • OLANZapine  5 mg Intramuscular Q3H PRN Max 6/day Lila Paredes MD     • OLANZapine  2.5 mg Oral Q3H PRN Max 8/day Lila Paredes MD     • [START ON 8/21/2023] paliperidone  156 mg IM- Deltoid Once Lila Paredes MD     • polyethylene glycol  17 g Oral Daily PRN Lila Paredes MD     • propranolol  10 mg Oral Q8H PRN Lila Paredes MD     • risperiDONE  4 mg Oral HS Lila Paredes MD     • senna-docusate sodium  1 tablet Oral Daily PRN Lila Paredes MD     • traZODone  50 mg Oral HS PRN Lila Paredes MD         Behavioral Health Medications: all current active meds have been reviewed. Changes as in plan section above. Laboratory results:  I have personally reviewed all pertinent laboratory/tests results. No results found for this or any previous visit (from the past 48 hour(s)). This note has been constructed using a voice recognition system. There may be translation, syntax, or grammatical errors. If you have any questions, please contact the dictating author.     Claudeen Loh,   Psychiatry Residency, PGY-2

## 2023-08-20 NOTE — PROGRESS NOTES
[facility] Group Note     08/20/23 1300   Activity/Group Checklist   Group Life Skills  (Teamwork and Communication)   Attendance Attended   Attendance Duration (min) 0-15  (only attended group briefly)   Interactions Interacted appropriately  (partial participation with encouragement)   Affect/Mood Appropriate;Calm   Goals Achieved Able to listen to others; Able to engage in interactions; Able to reflect/comment on own behavior;Able to recieve feedback; Able to give feedback to another

## 2023-08-20 NOTE — NURSING NOTE
Patient denies AVH/SI/HI. Patient is slightly less irritable today. Is superficially pleasant. He is in behavioral control, and less demanding today. Patient is cooperative, and compliant.

## 2023-08-20 NOTE — PLAN OF CARE
Problem: Alteration in Thoughts and Perception  Goal: Treatment Goal: Gain control of psychotic behaviors/thinking, reduce/eliminate presenting symptoms and demonstrate improved reality functioning upon discharge  Outcome: Progressing  Goal: Verbalize thoughts and feelings  Description: Interventions:  - Promote a nonjudgmental and trusting relationship with the patient through active listening and therapeutic communication  - Assess patient's level of functioning, behavior and potential for risk  - Engage patient in 1 on 1 interactions  - Encourage patient to express fears, feelings, frustrations, and discuss symptoms    - [place] patient to reality, help patient recognize reality-based thinking   - Administer medications as ordered and assess for potential side effects  - Provide the patient education related to the signs and symptoms of the illness and desired effects of prescribed medications  Outcome: Progressing  Goal: Refrain from acting on delusional thinking/internal stimuli  Description: Interventions:  - Monitor patient closely, per order   - Utilize least restrictive measures   - Set reasonable limits, give positive feedback for acceptable   - Administer medications as ordered and monitor of potential side effects  Outcome: Progressing  Goal: Agree to be compliant with medication regime, as prescribed and report medication side effects  Description: Interventions:  - Offer appropriate PRN medication and supervise ingestion; conduct AIMS, as needed   Outcome: Progressing  Goal: Attend and participate in unit activities, including therapeutic, recreational, and educational groups  Description: Interventions:  -Encourage Visitation and family involvement in care  Outcome: Progressing  Goal: Recognize dysfunctional thoughts, communicate reality-based thoughts at the time of discharge  Description: Interventions:  - Provide medication and psycho-education to assist patient in compliance and developing insight into his/her illness   Outcome: Progressing  Goal: Complete daily ADLs, including personal hygiene independently, as able  Description: Interventions:  - Observe, teach, and assist patient with ADLS  - Monitor and promote a balance of rest/activity, with adequate nutrition and elimination   Outcome: Progressing     Problem: Ineffective Coping  Goal: Cooperates with admission process  Description: Interventions:   - Complete admission process  Outcome: Progressing  Goal: Identifies ineffective coping skills  Outcome: Progressing  Goal: Identifies healthy coping skills  Outcome: Progressing  Goal: Demonstrates healthy coping skills  Outcome: Progressing  Goal: Participates in unit activities  Description: Interventions:  - Provide therapeutic environment   - Provide required programming   - Redirect inappropriate behaviors   Outcome: Progressing     Problem: INVOLUNTARY ADMIT  Goal: Will cooperate with staff recommendations and doctor's orders and will demonstrate appropriate behavior  Description: INTERVENTIONS:  - Treat underlying conditions and offer medication as ordered  - Educate regarding involuntary admission procedures and rules  - Utilize positive consistent limit setting strategies to support patient and staff safety  Outcome: Progressing     Problem: DISCHARGE PLANNING  Goal: Discharge to home or other facility with appropriate resources  Description: INTERVENTIONS:  - Identify barriers to discharge w/patient and caregiver  - Arrange for needed discharge resources and transportation as appropriate  - Identify discharge learning needs (meds, wound care, etc.)  - Arrange for interpretive services to assist at discharge as needed  - Refer to Case Management Department for coordinating discharge planning if the patient needs post-hospital services based on physician/advanced practitioner order or complex needs related to functional status, cognitive ability, or social support system  Outcome: Progressing

## 2023-08-21 PROCEDURE — 99232 SBSQ HOSP IP/OBS MODERATE 35: CPT | Performed by: STUDENT IN AN ORGANIZED HEALTH CARE EDUCATION/TRAINING PROGRAM

## 2023-08-21 RX ADMIN — OMEGA-3 FATTY ACIDS CAP 1000 MG 1000 MG: 1000 CAP at 08:06

## 2023-08-21 RX ADMIN — CLONIDINE HYDROCHLORIDE 0.2 MG: 0.1 TABLET ORAL at 21:13

## 2023-08-21 RX ADMIN — CLONIDINE HYDROCHLORIDE 0.2 MG: 0.1 TABLET ORAL at 08:06

## 2023-08-21 RX ADMIN — PALIPERIDONE PALMITATE 156 MG: 156 INJECTION INTRAMUSCULAR at 11:20

## 2023-08-21 NOTE — PROGRESS NOTES
Progress Note - Behavioral Health   Valentine Yoo 41 y.o. male MRN: [id number]  Unit/Bed#: [facility] 345-02 Encounter: [id number]    Assessment/Plan   Principal Problem:    Schizoaffective disorder, bipolar type with good prognostic features ([address])  Active Problems:    Alcohol use disorder    Cannabis dependence, continuous ([address])    H/O medication noncompliance      Recommended Treatment:   Continue clonidine 0.2 BID  Received Evelyn Jensenenna IM injection booster 156 mg today. Next injection 9/18/23. D/c'd oral Risperdal    Continue with group therapy, milieu therapy and occupational therapy. Continue frequent safety checks and vitals per unit protocol. Case discussed with treatment team.  Risks, benefits and possible side effects of Medications: Risks, benefits, and possible side effects of medications have been explained to the patient, who verbalizes understanding    ------------------------------------------------------------  Current legal status: 303    Subjective: Per nursing report, Kareem Reynolds has been occasionally irritable, cooperative on the unit and compliant with scheduled medications. He tends to be visible and superficially pleasant at times. On interview today, Kareem Reynolds is irritable. He was told by one of the staff that he was getting discharged today after his RIVERS, however, he is not getting discharged until tomorrow. He said multiple times that he cleaned his room and scrubbed the bathroom for preparation of discharge today. He was informed that he needs to be monitored for adverse reactions following the injections, and he eventually understood and agreed on discharge tomorrow. He explained that he wanted to get home and get back to work. 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 He reports he enjoys it. He was very fixated on the injection and followed his psychiatric team to ask the nurse why he had not received it yet.  He was informed that pharmacy had not brought it up yet, however, he would receive it as soon as it was on the floor. Progress Toward Goals: Desiree Buckner continues to be irritable, however has been on behavioral control with no outbursts. He is anxious for his discharge tomorrow. Psychiatric Review of Systems:  Behavior over the last 24 hours: unchanged  Sleep: normal  Appetite: adequate  Medication side effects: none verbalized  ROS: Complete review of systems is negative except as noted above.     Vital signs in last 24 hours:  Temp:  [97.7 °F (36.5 °C)-98.2 °F (36.8 °C)] 98.2 °F (36.8 °C)  HR:  [80-83] 82  Resp:  [16] 16  BP: (131-160)/(80-94) 131/92    Mental Status Exam:  Appearance:  alert, good eye contact, appears stated age, casually dressed and appropriate grooming and hygiene   Behavior:  cooperative and irritable   Motor: no abnormal movements and normal gait and balance   Speech:  spontaneous, clear, normal volume and coherent   Mood:  "fine"   Affect:  constricted and irritable   Thought Process:  Organized, logical, goal-directed, perseverative regarding discharge and RIVERS   Thought Content: no verbalized delusions or overt paranoia   Perceptual disturbances: no reported hallucinations and does not appear to be responding to internal stimuli at this time   Risk Potential: No active or passive suicidal or homicidal ideation was verbalized during interview   Cognition: oriented to self and situation and appears to be of average intelligence   Insight:  Limited   Judgment: Improving     Current Medications:  Current Facility-Administered Medications   Medication Dose Route Frequency Provider Last Rate   • acetaminophen  650 mg Oral Q6H PRN Brigette Estrada MD     • aluminum-magnesium hydroxide-simethicone  30 mL Oral Q4H PRN Brigette Estrada MD     • benztropine  1 mg Intramuscular Q4H PRN Max 6/day Brigette Estrada MD     • benztropine  1 mg Oral Q4H PRN Max 6/day Brigette Estrada MD     • cloNIDine  0.2 mg Oral Q12H [address] [facility] Gerow-Smith, CRNP     • hydrOXYzine HCL  50 mg Oral Q6H PRN Max 4/day Sarah Espinal MD      Or   • diphenhydrAMINE  50 mg Intramuscular Q6H PRN Sarah Espinal MD     • fish oil  1,000 mg Oral Daily LISY Yung     • glycerin-hypromellose-  1 drop Both Eyes Q3H PRN Sarah Espinal MD     • hydrOXYzine HCL  100 mg Oral Q6H PRN Max 4/day Sarah Espinal MD      Or   • LORazepam  2 mg Intramuscular Q6H PRN Sarah Espinal MD     • hydrOXYzine HCL  25 mg Oral Q6H PRN Max 4/day Sarah Espinal MD     • ibuprofen  600 mg Oral Q8H PRN LISY Yung     • OLANZapine  10 mg Oral Q3H PRN Max 3/day Sarah Espinal MD      Or   • OLANZapine  10 mg Intramuscular Q3H PRN Max 3/day Sarah Espinal MD     • OLANZapine  5 mg Oral Q3H PRN Max 6/day Sarah Espinal MD      Or   • OLANZapine  5 mg Intramuscular Q3H PRN Max 6/day Sarah Espinal MD     • OLANZapine  2.5 mg Oral Q3H PRN Max 8/day Sarah Espinal MD     • polyethylene glycol  17 g Oral Daily PRN Sarah Espinal MD     • propranolol  10 mg Oral Q8H PRN Sarah Espinal MD     • senna-docusate sodium  1 tablet Oral Daily PRN Sarah Espinal MD     • traZODone  50 mg Oral HS PRN Sarah Espinal MD         Behavioral Health Medications: all current active meds have been reviewed. Changes as in plan section above. Laboratory results:  I have personally reviewed all pertinent laboratory/tests results. No results found for this or any previous visit (from the past 48 hour(s)).      Edwin Valencia MD

## 2023-08-21 NOTE — PLAN OF CARE
Problem: Alteration in Thoughts and Perception  Goal: Treatment Goal: Gain control of psychotic behaviors/thinking, reduce/eliminate presenting symptoms and demonstrate improved reality functioning upon discharge  Outcome: Progressing  Goal: Verbalize thoughts and feelings  Description: Interventions:  - Promote a nonjudgmental and trusting relationship with the patient through active listening and therapeutic communication  - Assess patient's level of functioning, behavior and potential for risk  - Engage patient in 1 on 1 interactions  - Encourage patient to express fears, feelings, frustrations, and discuss symptoms    - [place] patient to reality, help patient recognize reality-based thinking   - Administer medications as ordered and assess for potential side effects  - Provide the patient education related to the signs and symptoms of the illness and desired effects of prescribed medications  Outcome: Progressing  Goal: Refrain from acting on delusional thinking/internal stimuli  Description: Interventions:  - Monitor patient closely, per order   - Utilize least restrictive measures   - Set reasonable limits, give positive feedback for acceptable   - Administer medications as ordered and monitor of potential side effects  Outcome: Progressing  Goal: Agree to be compliant with medication regime, as prescribed and report medication side effects  Description: Interventions:  - Offer appropriate PRN medication and supervise ingestion; conduct AIMS, as needed   Outcome: Progressing  Goal: Attend and participate in unit activities, including therapeutic, recreational, and educational groups  Description: Interventions:  -Encourage Visitation and family involvement in care  Outcome: Progressing  Goal: Recognize dysfunctional thoughts, communicate reality-based thoughts at the time of discharge  Description: Interventions:  - Provide medication and psycho-education to assist patient in compliance and developing insight into his/her illness   Outcome: Progressing  Goal: Complete daily ADLs, including personal hygiene independently, as able  Description: Interventions:  - Observe, teach, and assist patient with ADLS  - Monitor and promote a balance of rest/activity, with adequate nutrition and elimination   Outcome: Progressing     Problem: Ineffective Coping  Goal: Cooperates with admission process  Description: Interventions:   - Complete admission process  Outcome: Progressing  Goal: Identifies ineffective coping skills  Outcome: Progressing  Goal: Identifies healthy coping skills  Outcome: Progressing  Goal: Demonstrates healthy coping skills  Outcome: Progressing  Goal: Participates in unit activities  Description: Interventions:  - Provide therapeutic environment   - Provide required programming   - Redirect inappropriate behaviors   Outcome: Progressing     Problem: INVOLUNTARY ADMIT  Goal: Will cooperate with staff recommendations and doctor's orders and will demonstrate appropriate behavior  Description: INTERVENTIONS:  - Treat underlying conditions and offer medication as ordered  - Educate regarding involuntary admission procedures and rules  - Utilize positive consistent limit setting strategies to support patient and staff safety  Outcome: Progressing     Problem: DISCHARGE PLANNING  Goal: Discharge to home or other facility with appropriate resources  Description: INTERVENTIONS:  - Identify barriers to discharge w/patient and caregiver  - Arrange for needed discharge resources and transportation as appropriate  - Identify discharge learning needs (meds, wound care, etc.)  - Arrange for interpretive services to assist at discharge as needed  - Refer to Case Management Department for coordinating discharge planning if the patient needs post-hospital services based on physician/advanced practitioner order or complex needs related to functional status, cognitive ability, or social support system  Outcome: Progressing

## 2023-08-21 NOTE — NURSING NOTE
Patient remained visible on the unit throughout the evening. Cooperative with routine. Denied any unmet needs. HS medication compliant. Retreated to his room for sleep without issue.

## 2023-08-21 NOTE — NURSING NOTE
Pt is disappointed that he is not being discharged today but accepting that he will be leaving tomorrow. Denies SI/HI/AH/VH. Medication and meal compliant. Denies any unmet needs or complaints at this time.

## 2023-08-21 NOTE — PROGRESS NOTES
08/21/23 0910   Team Meeting   Meeting Type Daily Rounds   Team Members Present   Team Members Present Physician;Nurse;   Physician Team Member [address] [place] [address] [facility] Team Member Candido   Patient/Family Present   Patient Present No   Patient's Family Present No     Pt med/meal compliant. Visible on the unit, irritable, paranoid r/t peers on Saturday. Sunday pt was visible, pleasant, cooperative, less paranoid r/t peers. Pt to receive RIVERS injection today. Discharge tomorrow.

## 2023-08-22 VITALS
RESPIRATION RATE: 18 BRPM | TEMPERATURE: 97.9 F | SYSTOLIC BLOOD PRESSURE: 152 MMHG | BODY MASS INDEX: 32.43 KG/M2 | HEART RATE: 82 BPM | WEIGHT: 201.8 LBS | OXYGEN SATURATION: 98 % | HEIGHT: 66 IN | DIASTOLIC BLOOD PRESSURE: 90 MMHG

## 2023-08-22 PROBLEM — I10 HYPERTENSION: Chronic | Status: ACTIVE | Noted: 2023-08-22

## 2023-08-22 PROBLEM — F10.90 ALCOHOL USE DISORDER: Status: RESOLVED | Noted: 2018-10-01 | Resolved: 2023-08-22

## 2023-08-22 PROCEDURE — 99238 HOSP IP/OBS DSCHRG MGMT 30/<: CPT | Performed by: STUDENT IN AN ORGANIZED HEALTH CARE EDUCATION/TRAINING PROGRAM

## 2023-08-22 RX ORDER — CHLORAL HYDRATE 500 MG
1000 CAPSULE ORAL DAILY
Qty: 30 CAPSULE | Refills: 1 | Status: SHIPPED | OUTPATIENT
Start: 2023-08-22

## 2023-08-22 RX ORDER — CLONIDINE HYDROCHLORIDE 0.2 MG/1
0.2 TABLET ORAL EVERY 12 HOURS SCHEDULED
Qty: 60 TABLET | Refills: 1 | Status: SHIPPED | OUTPATIENT
Start: 2023-08-22

## 2023-08-22 RX ADMIN — OMEGA-3 FATTY ACIDS CAP 1000 MG 1000 MG: 1000 CAP at 08:59

## 2023-08-22 RX ADMIN — CLONIDINE HYDROCHLORIDE 0.2 MG: 0.1 TABLET ORAL at 08:59

## 2023-08-22 NOTE — DISCHARGE INSTR - OTHER ORDERS
CRISIS INFORMATION  If you are experiencing a mental health emergency, you may call the [facility] 24 hours a day, 7 days per week at [phone]. In [facility], call [phone]. Madeline Caicedo is a confidential 24/7 telephone support service manned by trained mental health consumers. [facility] provides support, a listening ear and can provide information about available services. [facility] specializes in the concerns of mental health consumers, their families and friends. However, we are also here for anyone who has a mental health concern, is confused about or just doesn't know anything about mental health or where to get information. To reach Madeline Caicedo, call [phone]. HOW TO GET SUBSTANCE ABUSE HELP:  If you or someone you know has a drug or alcohol problem, there is help:  Karlie Gupta,6Th Floor: [address]: [phone]  An assessment is the first step. In addition to those listed there are other programs available in the area but assessment is best to determine an appropriate level of care. If you DO NOT have Medical Assistance (MA) or [employer], an assessment can be scheduled at one of these providers:  [address]  [address] [facility], [facility]  [phone]   [facility]  [address] [facility],2 And 3 S Floors., [facility], [address] [place] [address]  [address] [facility].  MAINOR, [address]  [address]  [address] [facility], [facility]   Step by [address] [facility], [address] [place] [address]  [address] [facility], [address] [place] [address]  [address] [facility]., [facility], [address] [place] [address]  [phone]     If you [address], an assessment can be scheduled at one of these providers:  [place] on Alcohol & Drug Abuse  [facility]., BALDOMEROJEFFREY, [address] [facility]  [address] [facility] [address] [facility] [address], [facility]  150 [facility] D&A Intake [facility]  [address] [facility]., 1st Floor, Hugo HAYWARD  [phone]  [address] [facility], Yasmeen Keys Chol, [address] [facility]  [address] [facility],2 And 3 S Floors., [facility], [address] [facility]  [address] [facility]. MAINOR, [address]  [phone]   NET ([employer])  [address] [facility], Hugo HAYWARD  [address]  [address]   Step by [address] [facility], [address] [facility]  [address] [facility]., [facility], [address] [place] [address]  [address] [facility] [address] RennyUnion County General HospitalMACHELLE maldonadoTuba City Regional Health Care CorporationJEFFREY, [address] [facility]  [phone]     If you [address] [facility], an assessment can be scheduled at one of these providers. Please contact these Providers to determine if they are in your network plan:  [facility] D&A Intake Unit  [address] [facility]., 1st Floor, Hugo HAYWARD  [facility]  [address] [facility],2 And 3 S Floors., [facility], 350 [facility]  [phone]   [address] [facility]  Christianofarhat. MAINOR, [address]  [phone]   NET ([employer])  [address]  [address] [facility], Hugo HAYWARD  [address]  [address] [place] [address] [address] [facility]., ISMAEL Ignacio, [address]

## 2023-08-22 NOTE — NURSING NOTE
Patient remained mostly isolative to room throughout the evening. Upon approach, patient was cooperative. Patient was reluctant to take his scheduled HS medications "the doctor said the pills stop." Patient was educated on Risperdal and long acting medication- InvegaSustenna. Patient was also educated on clonidine. Patient was ultimately medication compliant. No unmet needs noted.

## 2023-08-22 NOTE — PLAN OF CARE
Problem: Alteration in Thoughts and Perception  Goal: Treatment Goal: Gain control of psychotic behaviors/thinking, reduce/eliminate presenting symptoms and demonstrate improved reality functioning upon discharge  Outcome: Adequate for Discharge  Goal: Verbalize thoughts and feelings  Description: Interventions:  - Promote a nonjudgmental and trusting relationship with the patient through active listening and therapeutic communication  - Assess patient's level of functioning, behavior and potential for risk  - Engage patient in 1 on 1 interactions  - Encourage patient to express fears, feelings, frustrations, and discuss symptoms    - [place] patient to reality, help patient recognize reality-based thinking   - Administer medications as ordered and assess for potential side effects  - Provide the patient education related to the signs and symptoms of the illness and desired effects of prescribed medications  Outcome: Adequate for Discharge  Goal: Refrain from acting on delusional thinking/internal stimuli  Description: Interventions:  - Monitor patient closely, per order   - Utilize least restrictive measures   - Set reasonable limits, give positive feedback for acceptable   - Administer medications as ordered and monitor of potential side effects  Outcome: Adequate for Discharge  Goal: Agree to be compliant with medication regime, as prescribed and report medication side effects  Description: Interventions:  - Offer appropriate PRN medication and supervise ingestion; conduct AIMS, as needed   Outcome: Adequate for Discharge  Goal: Attend and participate in unit activities, including therapeutic, recreational, and educational groups  Description: Interventions:  -Encourage Visitation and family involvement in care  Outcome: Adequate for Discharge  Goal: Recognize dysfunctional thoughts, communicate reality-based thoughts at the time of discharge  Description: Interventions:  - Provide medication and psycho-education to assist patient in compliance and developing insight into his/her illness   Outcome: Adequate for Discharge  Goal: Complete daily ADLs, including personal hygiene independently, as able  Description: Interventions:  - Observe, teach, and assist patient with ADLS  - Monitor and promote a balance of rest/activity, with adequate nutrition and elimination   Outcome: Adequate for Discharge     Problem: Ineffective Coping  Goal: Cooperates with admission process  Description: Interventions:   - Complete admission process  Outcome: Adequate for Discharge  Goal: Identifies ineffective coping skills  Outcome: Adequate for Discharge  Goal: Identifies healthy coping skills  Outcome: Adequate for Discharge  Goal: Demonstrates healthy coping skills  Outcome: Adequate for Discharge  Goal: Participates in unit activities  Description: Interventions:  - Provide therapeutic environment   - Provide required programming   - Redirect inappropriate behaviors   Outcome: Adequate for Discharge     Problem: INVOLUNTARY ADMIT  Goal: Will cooperate with staff recommendations and doctor's orders and will demonstrate appropriate behavior  Description: INTERVENTIONS:  - Treat underlying conditions and offer medication as ordered  - Educate regarding involuntary admission procedures and rules  - Utilize positive consistent limit setting strategies to support patient and staff safety  Outcome: Adequate for Discharge     Problem: DISCHARGE PLANNING  Goal: Discharge to home or other facility with appropriate resources  Description: INTERVENTIONS:  - Identify barriers to discharge w/patient and caregiver  - Arrange for needed discharge resources and transportation as appropriate  - Identify discharge learning needs (meds, wound care, etc.)  - Arrange for interpretive services to assist at discharge as needed  - Refer to Case Management Department for coordinating discharge planning if the patient needs post-hospital services based on physician/advanced practitioner order or complex needs related to functional status, cognitive ability, or social support system  Outcome: Adequate for Discharge

## 2023-08-22 NOTE — TREATMENT TEAM
08/22/23 1300   Activity/Group Checklist   Group   (recivery group)   Attendance Attended   Attendance Duration (min) 31-45   Interactions Interacted appropriately   Affect/Mood Appropriate   Goals Achieved Discussed coping strategies; Discussed self-esteem issues; Able to listen to others; Able to engage in interactions; Able to self-disclose; Able to recieve feedback     Patient was looking forward to discharge and was focused on his lift coming to take him home

## 2023-08-22 NOTE — BH TRANSITION RECORD
Contact Information: If you have any questions, concerns, pended studies, tests and/or procedures, or emergencies regarding your inpatient behavioral health visit. Please contact [facility] behavioral health unit 3B [phone] and ask to speak to a , nurse or physician. A contact is available 24 hours/ 7 days a week at this number. Summary of Procedures Performed During your Stay:  Below is a list of major procedures performed during your hospital stay and a summary of results:    EKG 8/13/23: Normal sinus rhythm, normal EKG, QTc interval 410    If studies are pending at discharge, follow up with your PCP and/or referring provider.
[phone]

## 2023-08-22 NOTE — PROGRESS NOTES
Patient completed his relapse prevention. He does not understand what brought him into the hospital other than his brother continues to 302 him. He was able to identify his warning signs when he is stressed. He does have coping skills and his only support is his mother. We reviewed the community support systems.

## 2023-08-22 NOTE — DISCHARGE INSTR - APPOINTMENTS
[facility] or [place], our [employer], will be calling you after your discharge, on the phone number that you provided. They will be available as an additional support, if needed. If you wish to speak with one of them, you may contact [facility] at [phone] or Carrol Adkins at [phone].

## 2023-08-22 NOTE — PLAN OF CARE
Problem: Alteration in Thoughts and Perception  Goal: Treatment Goal: Gain control of psychotic behaviors/thinking, reduce/eliminate presenting symptoms and demonstrate improved reality functioning upon discharge  Outcome: Progressing  Goal: Verbalize thoughts and feelings  Description: Interventions:  - Promote a nonjudgmental and trusting relationship with the patient through active listening and therapeutic communication  - Assess patient's level of functioning, behavior and potential for risk  - Engage patient in 1 on 1 interactions  - Encourage patient to express fears, feelings, frustrations, and discuss symptoms    - [place] patient to reality, help patient recognize reality-based thinking   - Administer medications as ordered and assess for potential side effects  - Provide the patient education related to the signs and symptoms of the illness and desired effects of prescribed medications  Outcome: Progressing  Goal: Refrain from acting on delusional thinking/internal stimuli  Description: Interventions:  - Monitor patient closely, per order   - Utilize least restrictive measures   - Set reasonable limits, give positive feedback for acceptable   - Administer medications as ordered and monitor of potential side effects  Outcome: Progressing  Goal: Agree to be compliant with medication regime, as prescribed and report medication side effects  Description: Interventions:  - Offer appropriate PRN medication and supervise ingestion; conduct AIMS, as needed   Outcome: Progressing  Goal: Attend and participate in unit activities, including therapeutic, recreational, and educational groups  Description: Interventions:  -Encourage Visitation and family involvement in care  Outcome: Progressing  Goal: Recognize dysfunctional thoughts, communicate reality-based thoughts at the time of discharge  Description: Interventions:  - Provide medication and psycho-education to assist patient in compliance and developing insight into his/her illness   Outcome: Progressing  Goal: Complete daily ADLs, including personal hygiene independently, as able  Description: Interventions:  - Observe, teach, and assist patient with ADLS  - Monitor and promote a balance of rest/activity, with adequate nutrition and elimination   Outcome: Progressing     Problem: Ineffective Coping  Goal: Cooperates with admission process  Description: Interventions:   - Complete admission process  Outcome: Progressing  Goal: Identifies ineffective coping skills  Outcome: Progressing  Goal: Identifies healthy coping skills  Outcome: Progressing  Goal: Demonstrates healthy coping skills  Outcome: Progressing  Goal: Participates in unit activities  Description: Interventions:  - Provide therapeutic environment   - Provide required programming   - Redirect inappropriate behaviors   Outcome: Progressing     Problem: INVOLUNTARY ADMIT  Goal: Will cooperate with staff recommendations and doctor's orders and will demonstrate appropriate behavior  Description: INTERVENTIONS:  - Treat underlying conditions and offer medication as ordered  - Educate regarding involuntary admission procedures and rules  - Utilize positive consistent limit setting strategies to support patient and staff safety  Outcome: Progressing
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
## 2023-08-22 NOTE — PROGRESS NOTES
08/22/23 0916   Team Meeting   Meeting Type Daily Rounds   Team Members Present   Team Members Present Physician;Nurse;   Physician Team Member [address] [place] [address] [facility] Team Member Candido   Patient/Family Present   Patient Present No   Patient's Family Present No     Discharge today.

## 2023-08-22 NOTE — NURSING NOTE
Pt is anxious and excited for discharge. Medication and meal compliant. Denying any psych symptoms at this time. AVS gone over with pt and he verbalized understanding and intends to be compliant with scheduled mediations and follow up appointments. Pt left unit with belongings at his side. Pt escorted to lobby and picked up by [facility] to be discharged home.

## 2023-08-22 NOTE — SOCIAL WORK
Pt to D/C today. Pt denies SI/HI/AVH. Pt oriented x3. Pt to d/c to his mother's home via lyft. Pt to follow up with [facility] on . Pt to follow up with [facility] ICM referral. Scripts sent to preferred pharmacy.      Discharge Address: [address] [place] [address]   Phone: [phone] (Mother's phone number - Maximiliano Streeter)

## 2023-08-22 NOTE — TREATMENT TEAM
08/22/23 4015   Activity/Group Checklist   Group Community meeting   Attendance Attended   Attendance Duration (min) 16-30   Interactions Interacted appropriately   Affect/Mood Appropriate   Goals Achieved Able to listen to others; Able to engage in interactions; Able to self-disclose; Able to recieve feedback
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
## 2023-09-21 ENCOUNTER — HOSPITAL ENCOUNTER (OUTPATIENT)
Dept: INFUSION CENTER | Facility: HOSPITAL | Age: 48
End: 2023-09-21
Attending: STUDENT IN AN ORGANIZED HEALTH CARE EDUCATION/TRAINING PROGRAM
Payer: MEDICARE

## 2023-09-21 DIAGNOSIS — F25.0 SCHIZOAFFECTIVE DISORDER, BIPOLAR TYPE WITH GOOD PROGNOSTIC FEATURES (HCC): Primary | ICD-10-CM

## 2023-09-21 PROCEDURE — 96372 THER/PROPH/DIAG INJ SC/IM: CPT

## 2023-09-21 RX ADMIN — PALIPERIDONE PALMITATE 156 MG: 156 INJECTION INTRAMUSCULAR at 15:13

## 2023-09-21 NOTE — PROGRESS NOTES
Pt tolerated invega to right deltoid IM without complications, confirmed next appt, AVS declined, states he has an appt card with next appt.

## 2023-10-19 ENCOUNTER — HOSPITAL ENCOUNTER (OUTPATIENT)
Dept: INFUSION CENTER | Facility: HOSPITAL | Age: 48
End: 2023-10-19
Attending: STUDENT IN AN ORGANIZED HEALTH CARE EDUCATION/TRAINING PROGRAM
Payer: MEDICARE

## 2023-10-19 DIAGNOSIS — F25.0 SCHIZOAFFECTIVE DISORDER, BIPOLAR TYPE WITH GOOD PROGNOSTIC FEATURES (HCC): Primary | ICD-10-CM

## 2023-10-19 PROCEDURE — 96372 THER/PROPH/DIAG INJ SC/IM: CPT

## 2023-10-19 RX ADMIN — PALIPERIDONE PALMITATE 156 MG: 156 INJECTION INTRAMUSCULAR at 15:35

## 2023-10-19 NOTE — PROGRESS NOTES
Patient tolerated L arm Invega injection without issue. Next appointment confirmed, AVS given. Pt in for labs and follow up visit today per protocol with Dr. Lozada and RN. Porsche Sherwood is C3D1 of treatment. Mikayla Maurice reports the following adverse events at this time: gr 2 hot flashes, gr 1 insomnia, gr 1 skin rash.  Vital signs are stable.  Patient to go to infusion center after appointment to receive carbo/taxol/atezo/placebo.  Next appt is scheduled for 6/16/20.

## 2023-11-16 ENCOUNTER — HOSPITAL ENCOUNTER (OUTPATIENT)
Dept: INFUSION CENTER | Facility: HOSPITAL | Age: 48
End: 2023-11-16
Attending: STUDENT IN AN ORGANIZED HEALTH CARE EDUCATION/TRAINING PROGRAM
Payer: MEDICARE

## 2023-11-16 DIAGNOSIS — F25.0 SCHIZOAFFECTIVE DISORDER, BIPOLAR TYPE WITH GOOD PROGNOSTIC FEATURES (HCC): Primary | ICD-10-CM

## 2023-11-16 PROCEDURE — 96372 THER/PROPH/DIAG INJ SC/IM: CPT

## 2023-11-16 RX ADMIN — PALIPERIDONE PALMITATE 156 MG: 156 INJECTION INTRAMUSCULAR at 16:17

## 2023-12-15 ENCOUNTER — HOSPITAL ENCOUNTER (OUTPATIENT)
Dept: INFUSION CENTER | Facility: HOSPITAL | Age: 48
End: 2023-12-15
Attending: STUDENT IN AN ORGANIZED HEALTH CARE EDUCATION/TRAINING PROGRAM
Payer: MEDICARE

## 2023-12-15 DIAGNOSIS — F25.0 SCHIZOAFFECTIVE DISORDER, BIPOLAR TYPE WITH GOOD PROGNOSTIC FEATURES (HCC): Primary | ICD-10-CM

## 2023-12-15 PROCEDURE — 96372 THER/PROPH/DIAG INJ SC/IM: CPT

## 2023-12-15 RX ADMIN — PALIPERIDONE PALMITATE 156 MG: 156 INJECTION INTRAMUSCULAR at 15:04

## 2023-12-15 NOTE — PROGRESS NOTES
Pt tolerated Bronson Bane injection to L deltoid without difficulty. Invega flagged as contraindicated due to documented anaphylactic reaction to Polyethylene Glycol/Miralax. Pt reports never having this reaction or an allergic reaction to any medication. Next appt confirmed. AVS declined. Left ambulatory in stable condition.

## 2024-01-16 ENCOUNTER — HOSPITAL ENCOUNTER (OUTPATIENT)
Dept: INFUSION CENTER | Facility: HOSPITAL | Age: 49
Discharge: HOME | End: 2024-01-16
Attending: STUDENT IN AN ORGANIZED HEALTH CARE EDUCATION/TRAINING PROGRAM

## 2024-01-29 ENCOUNTER — HOSPITAL ENCOUNTER (OUTPATIENT)
Dept: INFUSION CENTER | Facility: HOSPITAL | Age: 49
Discharge: HOME | End: 2024-01-29
Attending: STUDENT IN AN ORGANIZED HEALTH CARE EDUCATION/TRAINING PROGRAM
Payer: MEDICARE

## 2024-01-29 DIAGNOSIS — F25.0 SCHIZOAFFECTIVE DISORDER, BIPOLAR TYPE WITH GOOD PROGNOSTIC FEATURES (HCC): Primary | ICD-10-CM

## 2024-01-29 PROCEDURE — 96372 THER/PROPH/DIAG INJ SC/IM: CPT

## 2024-01-29 RX ADMIN — PALIPERIDONE PALMITATE 156 MG: 156 INJECTION INTRAMUSCULAR at 15:15

## 2024-01-29 NOTE — PROGRESS NOTES
Pt tolerated Invega Sustenna injection to R deltoid IM without difficulty.  Invega flagged as contraindicated due to documented anaphylactic reaction to Polyethylene Glycol/Miralax.  Pt reports never having this reaction or an allergic reaction to any medication.  Next appt confirmed for 2/26/24/@3:00  AVS declined.  Left ambulatory with a steady gait.

## 2024-02-11 NOTE — ED NOTES
Pt requested to use the bathroom again and while in the bathroom pt took the full roll of toilet paper in the bathroom and wet it in the toilet and started to clean the toilet  Pt was told that when he has to urinate he will be using the urinal and after every use he would need to leave it outside of room so staff and empty it  Charge RN made aware       Zac Del Angel  04/18/22 9722 leg/Bilateral:

## 2024-03-06 ENCOUNTER — HOSPITAL ENCOUNTER (OUTPATIENT)
Dept: INFUSION CENTER | Facility: HOSPITAL | Age: 49
Discharge: HOME | End: 2024-03-06
Attending: STUDENT IN AN ORGANIZED HEALTH CARE EDUCATION/TRAINING PROGRAM
Payer: MEDICARE

## 2024-03-06 DIAGNOSIS — F25.0 SCHIZOAFFECTIVE DISORDER, BIPOLAR TYPE WITH GOOD PROGNOSTIC FEATURES (HCC): Primary | ICD-10-CM

## 2024-03-06 PROCEDURE — 96372 THER/PROPH/DIAG INJ SC/IM: CPT

## 2024-03-06 RX ADMIN — PALIPERIDONE PALMITATE 156 MG: 156 INJECTION INTRAMUSCULAR at 15:23

## 2024-03-06 NOTE — PROGRESS NOTES
Pt tolerated Invega Sustenna injection to L deltoid without difficulty.  Reports no prior reaction to Polyethylene Glycol.  Confirmed next appt on 4/3 at 1530.  AVS declined.  Left ambulatory in stable condition.   Spontaneous, unlabored and symmetrical

## 2024-04-03 ENCOUNTER — HOSPITAL ENCOUNTER (OUTPATIENT)
Dept: INFUSION CENTER | Facility: HOSPITAL | Age: 49
Discharge: HOME | End: 2024-04-03
Attending: STUDENT IN AN ORGANIZED HEALTH CARE EDUCATION/TRAINING PROGRAM

## 2024-04-04 ENCOUNTER — HOSPITAL ENCOUNTER (OUTPATIENT)
Dept: INFUSION CENTER | Facility: HOSPITAL | Age: 49
End: 2024-04-04
Payer: MEDICARE

## 2024-04-04 DIAGNOSIS — F25.0 SCHIZOAFFECTIVE DISORDER, BIPOLAR TYPE WITH GOOD PROGNOSTIC FEATURES (HCC): Primary | ICD-10-CM

## 2024-04-04 RX ADMIN — PALIPERIDONE PALMITATE 156 MG: 156 INJECTION INTRAMUSCULAR at 15:26

## 2024-04-04 NOTE — PROGRESS NOTES
Pt tolerated Invega Sustenna injection to R deltoid without difficulty.  Confirmed next appt on 5/2 at 1530.  AVS declined.  Left ambulatory in stable condition.

## 2024-05-07 ENCOUNTER — HOSPITAL ENCOUNTER (OUTPATIENT)
Dept: INFUSION CENTER | Facility: HOSPITAL | Age: 49
Discharge: HOME | End: 2024-05-07
Attending: STUDENT IN AN ORGANIZED HEALTH CARE EDUCATION/TRAINING PROGRAM
Payer: MEDICARE

## 2024-05-07 DIAGNOSIS — F25.0 SCHIZOAFFECTIVE DISORDER, BIPOLAR TYPE WITH GOOD PROGNOSTIC FEATURES (HCC): Primary | ICD-10-CM

## 2024-05-07 RX ADMIN — PALIPERIDONE PALMITATE 156 MG: 156 INJECTION INTRAMUSCULAR at 15:37

## 2024-05-07 NOTE — PROGRESS NOTES
Patient tolerated L arm Invega injection without issue. Next appointment confirmed June 4th-3pm. AVS declined.

## 2024-05-16 NOTE — PROGRESS NOTES
04/22/22 2381   Activity/Group Checklist   Group   (Community Meeting Group and Processing)   Attendance Attended   Attendance Duration (min) 16-30   Interactions Interacted appropriately   Affect/Mood Appropriate;Bright   Goals Achieved Identified feelings; Displayed empathy;Able to listen to others; Able to engage in interactions; Able to self-disclose; Able to give feedback to another Subjective itching, lip swelling and uvular edema resolved.    Patient has epi pen with her, does not need refill.    Advised on need for close monitoring of sx, return precautions, follow up.

## 2024-06-04 ENCOUNTER — HOSPITAL ENCOUNTER (OUTPATIENT)
Dept: INFUSION CENTER | Facility: HOSPITAL | Age: 49
Discharge: HOME | End: 2024-06-04
Attending: STUDENT IN AN ORGANIZED HEALTH CARE EDUCATION/TRAINING PROGRAM

## 2024-06-11 ENCOUNTER — HOSPITAL ENCOUNTER (OUTPATIENT)
Dept: INFUSION CENTER | Facility: HOSPITAL | Age: 49
Discharge: HOME | End: 2024-06-11
Attending: STUDENT IN AN ORGANIZED HEALTH CARE EDUCATION/TRAINING PROGRAM
Payer: MEDICARE

## 2024-06-11 DIAGNOSIS — F25.0 SCHIZOAFFECTIVE DISORDER, BIPOLAR TYPE WITH GOOD PROGNOSTIC FEATURES (HCC): Primary | ICD-10-CM

## 2024-06-11 PROCEDURE — 96372 THER/PROPH/DIAG INJ SC/IM: CPT

## 2024-06-11 RX ADMIN — PALIPERIDONE PALMITATE 156 MG: 156 INJECTION INTRAMUSCULAR at 15:17

## 2024-06-11 NOTE — PROGRESS NOTES
Patient tolerated right arm Invega injection without issue. Next appointment confirmed July 9th at 3pm-  infusion center. AVS declined.

## 2024-07-09 ENCOUNTER — HOSPITAL ENCOUNTER (OUTPATIENT)
Dept: INFUSION CENTER | Facility: HOSPITAL | Age: 49
Discharge: HOME | End: 2024-07-09
Attending: STUDENT IN AN ORGANIZED HEALTH CARE EDUCATION/TRAINING PROGRAM
Payer: MEDICARE

## 2024-07-09 DIAGNOSIS — F25.0 SCHIZOAFFECTIVE DISORDER, BIPOLAR TYPE WITH GOOD PROGNOSTIC FEATURES (HCC): Primary | ICD-10-CM

## 2024-07-09 PROCEDURE — 96372 THER/PROPH/DIAG INJ SC/IM: CPT

## 2024-07-09 RX ADMIN — PALIPERIDONE PALMITATE 156 MG: 156 INJECTION INTRAMUSCULAR at 15:12

## 2024-07-09 NOTE — PROGRESS NOTES
Patient tolerated L arm INVEGA injection without issue. Next appointment confirmed August 6th at 3:30pm-[facility] infusion. AVS declined.

## 2024-08-06 ENCOUNTER — HOSPITAL ENCOUNTER (OUTPATIENT)
Dept: INFUSION CENTER | Facility: HOSPITAL | Age: 49
Discharge: HOME | End: 2024-08-06
Attending: STUDENT IN AN ORGANIZED HEALTH CARE EDUCATION/TRAINING PROGRAM
Payer: MEDICARE

## 2024-08-06 DIAGNOSIS — F25.0 SCHIZOAFFECTIVE DISORDER, BIPOLAR TYPE WITH GOOD PROGNOSTIC FEATURES (HCC): Primary | ICD-10-CM

## 2024-08-06 PROCEDURE — 96372 THER/PROPH/DIAG INJ SC/IM: CPT

## 2024-08-06 RX ADMIN — PALIPERIDONE PALMITATE 156 MG: 156 INJECTION INTRAMUSCULAR at 15:27

## 2024-08-06 NOTE — PROGRESS NOTES
Ant Antoine  tolerated treatment well with no complications.  Admin in R deltoid    Ant Antoine is aware of future appt on 9/3/24 at 330.     AVS printed and given to Ant Antoine:  No (Declined by Ant Antoine)

## 2024-09-03 ENCOUNTER — HOSPITAL ENCOUNTER (OUTPATIENT)
Dept: INFUSION CENTER | Facility: HOSPITAL | Age: 49
End: 2024-09-03
Attending: STUDENT IN AN ORGANIZED HEALTH CARE EDUCATION/TRAINING PROGRAM

## 2024-09-10 ENCOUNTER — HOSPITAL ENCOUNTER (OUTPATIENT)
Dept: INFUSION CENTER | Facility: HOSPITAL | Age: 49
Discharge: HOME | End: 2024-09-10
Attending: STUDENT IN AN ORGANIZED HEALTH CARE EDUCATION/TRAINING PROGRAM
Payer: MEDICARE

## 2024-09-10 DIAGNOSIS — F25.0 SCHIZOAFFECTIVE DISORDER, BIPOLAR TYPE WITH GOOD PROGNOSTIC FEATURES (HCC): Primary | ICD-10-CM

## 2024-09-10 PROCEDURE — 96372 THER/PROPH/DIAG INJ SC/IM: CPT

## 2024-09-10 RX ADMIN — PALIPERIDONE PALMITATE 156 MG: 156 INJECTION INTRAMUSCULAR at 15:08

## 2024-09-10 NOTE — PROGRESS NOTES
Invega IM inj given in LEFT deltoid. Tolerated well, offers no complaints. Confirmed next appt, declined AVS

## 2024-10-08 ENCOUNTER — HOSPITAL ENCOUNTER (OUTPATIENT)
Dept: INFUSION CENTER | Facility: HOSPITAL | Age: 49
Discharge: HOME | End: 2024-10-08
Attending: STUDENT IN AN ORGANIZED HEALTH CARE EDUCATION/TRAINING PROGRAM

## 2024-10-15 ENCOUNTER — HOSPITAL ENCOUNTER (OUTPATIENT)
Dept: INFUSION CENTER | Facility: HOSPITAL | Age: 49
Discharge: HOME | End: 2024-10-15
Attending: STUDENT IN AN ORGANIZED HEALTH CARE EDUCATION/TRAINING PROGRAM
Payer: MEDICARE

## 2024-10-15 DIAGNOSIS — F25.0 SCHIZOAFFECTIVE DISORDER, BIPOLAR TYPE WITH GOOD PROGNOSTIC FEATURES (HCC): Primary | ICD-10-CM

## 2024-10-15 PROCEDURE — 96372 THER/PROPH/DIAG INJ SC/IM: CPT

## 2024-10-15 RX ADMIN — PALIPERIDONE PALMITATE 156 MG: 156 INJECTION INTRAMUSCULAR at 14:25

## 2024-10-15 NOTE — PROGRESS NOTES
Pt tolerated Invega injection to R deltoid without difficulty.  Confirmed next appt on 11/12 at 1530.  AVS declined.  Left ambulatory in stable condition.

## 2024-11-12 ENCOUNTER — HOSPITAL ENCOUNTER (OUTPATIENT)
Dept: INFUSION CENTER | Facility: HOSPITAL | Age: 49
Discharge: HOME | End: 2024-11-12
Attending: STUDENT IN AN ORGANIZED HEALTH CARE EDUCATION/TRAINING PROGRAM
Payer: MEDICARE

## 2024-11-12 DIAGNOSIS — F25.0 SCHIZOAFFECTIVE DISORDER, BIPOLAR TYPE WITH GOOD PROGNOSTIC FEATURES (HCC): Primary | ICD-10-CM

## 2024-11-12 PROCEDURE — 96372 THER/PROPH/DIAG INJ SC/IM: CPT

## 2024-11-12 RX ADMIN — PALIPERIDONE PALMITATE 156 MG: 156 INJECTION INTRAMUSCULAR at 15:20

## 2024-11-12 NOTE — PROGRESS NOTES
Pt tolerated Invega Sustenna injection to L deltoid without difficulty.  Confirmed next appt on 12/10 at 1430.  AVS declined.  Left ambulatory in stable condition.

## 2024-12-10 ENCOUNTER — HOSPITAL ENCOUNTER (OUTPATIENT)
Dept: INFUSION CENTER | Facility: HOSPITAL | Age: 49
Discharge: HOME | End: 2024-12-10
Attending: STUDENT IN AN ORGANIZED HEALTH CARE EDUCATION/TRAINING PROGRAM
Payer: MEDICARE

## 2024-12-10 DIAGNOSIS — F25.0 SCHIZOAFFECTIVE DISORDER, BIPOLAR TYPE WITH GOOD PROGNOSTIC FEATURES (HCC): Primary | ICD-10-CM

## 2024-12-10 PROCEDURE — 96372 THER/PROPH/DIAG INJ SC/IM: CPT

## 2024-12-10 RX ADMIN — PALIPERIDONE PALMITATE 156 MG: 156 INJECTION INTRAMUSCULAR at 14:53

## 2024-12-10 NOTE — PROGRESS NOTES
Patient tolerated R ARM INVEGA INJECTION without complications. Next appointment scheduled 1/7/2025. AVS declined.

## 2025-01-07 ENCOUNTER — HOSPITAL ENCOUNTER (OUTPATIENT)
Dept: INFUSION CENTER | Facility: HOSPITAL | Age: 50
Discharge: HOME | End: 2025-01-07
Attending: STUDENT IN AN ORGANIZED HEALTH CARE EDUCATION/TRAINING PROGRAM
Payer: MEDICARE

## 2025-01-07 DIAGNOSIS — F25.0 SCHIZOAFFECTIVE DISORDER, BIPOLAR TYPE WITH GOOD PROGNOSTIC FEATURES (HCC): Primary | ICD-10-CM

## 2025-01-07 PROCEDURE — 96372 THER/PROPH/DIAG INJ SC/IM: CPT

## 2025-01-07 RX ADMIN — PALIPERIDONE PALMITATE 156 MG: 156 INJECTION INTRAMUSCULAR at 14:28

## 2025-01-07 NOTE — PROGRESS NOTES
Pt tolerated Invega Sustenna injection to Leftdeltoid IM without difficulty.  Invega flagged as contraindicated due to documented anaphylactic reaction to Polyethylene Glycol/Miralax.  Pt reports never having this reaction or an allergic reaction to any medication.  Next appt confirmed for 2/5/25 @ 2:30 AVS declined.  Left ambulatory with a steady gait.

## 2025-02-05 ENCOUNTER — HOSPITAL ENCOUNTER (OUTPATIENT)
Dept: INFUSION CENTER | Facility: HOSPITAL | Age: 50
Discharge: HOME | End: 2025-02-05
Attending: STUDENT IN AN ORGANIZED HEALTH CARE EDUCATION/TRAINING PROGRAM
Payer: MEDICARE

## 2025-02-05 ENCOUNTER — HOSPITAL ENCOUNTER (OUTPATIENT)
Dept: INFUSION CENTER | Facility: HOSPITAL | Age: 50
Discharge: HOME | End: 2025-02-05
Attending: STUDENT IN AN ORGANIZED HEALTH CARE EDUCATION/TRAINING PROGRAM

## 2025-02-05 DIAGNOSIS — F25.0 SCHIZOAFFECTIVE DISORDER, BIPOLAR TYPE WITH GOOD PROGNOSTIC FEATURES (HCC): Primary | ICD-10-CM

## 2025-02-05 PROCEDURE — 96372 THER/PROPH/DIAG INJ SC/IM: CPT

## 2025-02-05 RX ADMIN — PALIPERIDONE PALMITATE 156 MG: 156 INJECTION INTRAMUSCULAR at 15:35

## 2025-02-05 NOTE — PROGRESS NOTES
Pt arrived for his INVEGA Sustenna injection. Pt states never having had any reactions from this medication nor any other medication.    INVEGA given without any incident in Rt deltoid. Pt tolerated same well.    Card with next appt given to pt and made aware to return on March the 5th at 1:30PM.    Pt was discharged in stable and steady condition with all belongings.

## 2025-03-05 ENCOUNTER — HOSPITAL ENCOUNTER (OUTPATIENT)
Dept: INFUSION CENTER | Facility: HOSPITAL | Age: 50
Discharge: HOME | End: 2025-03-05
Attending: STUDENT IN AN ORGANIZED HEALTH CARE EDUCATION/TRAINING PROGRAM
Payer: MEDICARE

## 2025-03-05 DIAGNOSIS — F25.0 SCHIZOAFFECTIVE DISORDER, BIPOLAR TYPE WITH GOOD PROGNOSTIC FEATURES (HCC): Primary | ICD-10-CM

## 2025-03-05 PROCEDURE — 96372 THER/PROPH/DIAG INJ SC/IM: CPT

## 2025-03-05 RX ADMIN — PALIPERIDONE PALMITATE 156 MG: 156 INJECTION INTRAMUSCULAR at 13:44

## 2025-03-05 NOTE — PROGRESS NOTES
Ant Antoine  tolerated treatment well with no complications.  Admin in R deltoid     Ant Antoine is aware of future appt on 4/2/25 at 130.     AVS printed and given to Ant Antoine:  No (Declined by Ant Antoine)

## 2025-03-07 NOTE — MEDICAL STUDENT
Progress Note - Behavioral Health   Camryn Dean 41 y o  male MRN: [id number]  Unit/Bed#: [facility] 868-21 Encounter: [id number]    Assessment/Plan   Principal Problem:    Bipolar I disorder, most recent episode manic, severe with psychotic features ([facility] )  Active Problems:    Alcohol abuse, continuous    Cannabis dependence, continuous ([facility] )    1  Bipolar I disorder, most recent episode manic, severe with psychotic features   -Patient's mood is more stable today and his insight has improved  -Continue risperidone 2 mg BID  -Carbamazepine level was 7 2; continue carbamazepine at 400 mg BID  -Check new carbamazepine level, CBC and CMP tomorrow AM  -Encourage group therapy, milieu therapy and occupational therapy    2  Alcohol Abuse   -Continue thiamine, folic acid and vitamin supplements  -Lorazepam treatment completed on 10/5  Subjective:  Mando Angeles has no acute complaints  His mood has improved  He appears calmer, less irritable and less grandiose  He has been compliant with his medications, attends group therapy and interacts with the other patients  He stated today that being here has helped him understand his Bipolar disorder and that he will continue to take medications after discharge  He had a meeting with his father over the weekend which went well and was trying to arrange another meeting with him today       Behavior over the last 24 hours:  improved  Sleep: normal  Appetite: weight gain of 8 pounds  Medication side effects: No  ROS: no complaints    Mental Status Evaluation:  Appearance:  age appropriate and casually dressed   Behavior:  cooperative   Speech:  normal pitch, normal volume and less pressured   Mood:  euthymic   Affect:  mood-congruent   Thought Process:  less tangential   Thought Content:  No delusions, slightly less grandiosity   Perceptual Disturbances: None   Risk Potential: Suicidal Ideations none, Homicidal Ideations none and Potential for Aggression No   Sensorium:  person, place and time/date   Cognition:  grossly intact   Consciousness:  alert and awake    Attention: attention span and concentration were age appropriate   Insight:  improved   Judgment: improved   Gait/Station: normal gait/station and normal balance   Motor Activity: no abnormal movements     Progress Toward Goals: Patient made progress towards his goals over the weekend and today has a more appropriate mood and an improved insight  Recommended Treatment: Continue with group therapy, milieu therapy and occupational therapy  Risks, benefits and possible side effects of Medications:   Risks, benefits, and possible side effects of medications explained to patient and patient verbalizes understanding  Medications: all current active meds have been reviewed  No

## 2025-04-01 DIAGNOSIS — F25.0 SCHIZOAFFECTIVE DISORDER, BIPOLAR TYPE WITH GOOD PROGNOSTIC FEATURES (HCC): Primary | ICD-10-CM

## 2025-04-02 ENCOUNTER — HOSPITAL ENCOUNTER (OUTPATIENT)
Dept: INFUSION CENTER | Facility: HOSPITAL | Age: 50
Discharge: HOME | End: 2025-04-02
Attending: STUDENT IN AN ORGANIZED HEALTH CARE EDUCATION/TRAINING PROGRAM
Payer: MEDICARE

## 2025-04-02 DIAGNOSIS — F25.0 SCHIZOAFFECTIVE DISORDER, BIPOLAR TYPE WITH GOOD PROGNOSTIC FEATURES (HCC): Primary | ICD-10-CM

## 2025-04-02 PROCEDURE — 96372 THER/PROPH/DIAG INJ SC/IM: CPT

## 2025-04-02 RX ADMIN — PALIPERIDONE PALMITATE 156 MG: 156 INJECTION INTRAMUSCULAR at 13:33

## 2025-04-02 NOTE — PROGRESS NOTES
Patient tolerated L arm Invega injection without complications. Next appointment scheduled 4/30 at 1:30pm- infusion center. Declined AVS.

## 2025-04-30 ENCOUNTER — HOSPITAL ENCOUNTER (OUTPATIENT)
Dept: INFUSION CENTER | Facility: HOSPITAL | Age: 50
End: 2025-04-30
Attending: STUDENT IN AN ORGANIZED HEALTH CARE EDUCATION/TRAINING PROGRAM

## 2025-05-01 ENCOUNTER — HOSPITAL ENCOUNTER (OUTPATIENT)
Dept: INFUSION CENTER | Facility: HOSPITAL | Age: 50
End: 2025-05-01
Attending: STUDENT IN AN ORGANIZED HEALTH CARE EDUCATION/TRAINING PROGRAM
Payer: MEDICARE

## 2025-05-01 DIAGNOSIS — F25.0 SCHIZOAFFECTIVE DISORDER, BIPOLAR TYPE WITH GOOD PROGNOSTIC FEATURES (HCC): Primary | ICD-10-CM

## 2025-05-01 PROCEDURE — 96372 THER/PROPH/DIAG INJ SC/IM: CPT

## 2025-05-01 RX ADMIN — PALIPERIDONE PALMITATE 156 MG: 156 INJECTION INTRAMUSCULAR at 14:07

## 2025-05-01 NOTE — PROGRESS NOTES
Patient tolerated R deltoid injection without complications. Next apt confirmed for May 29, at 1330. Declined AVS and patient left unit ambulatory with steady gait.

## 2025-05-01 NOTE — PLAN OF CARE
Problem: SAFETY ADULT  Goal: Patient will remain free of falls  Description: INTERVENTIONS:- Educate patient/family on patient safety including physical limitations- Instruct patient to call for assistance with activity - Consult OT/PT to assist with strengthening/mobility - Keep Call bell within reach- Keep bed low and locked with side rails adjusted as appropriate- Keep care items and personal belongings within reach- Initiate and maintain comfort rounds  Outcome: Progressing

## 2025-05-14 NOTE — ED NOTES
An (DEVICE CLSR L24 MM WATCHMAN FLX PRO LT ATR APPENDAGE - [id number]) occlusion device was deployed and released. Provider at bedside  Observed slapping head  Observed making :frog like" noises and spitting in his hand and rubbing it on his head       Hanh Bonilla RN  03/11/22 0967

## 2025-05-29 ENCOUNTER — HOSPITAL ENCOUNTER (OUTPATIENT)
Dept: INFUSION CENTER | Facility: HOSPITAL | Age: 50
Discharge: HOME | End: 2025-05-29
Attending: STUDENT IN AN ORGANIZED HEALTH CARE EDUCATION/TRAINING PROGRAM

## 2025-06-03 ENCOUNTER — HOSPITAL ENCOUNTER (OUTPATIENT)
Dept: INFUSION CENTER | Facility: HOSPITAL | Age: 50
Discharge: HOME | End: 2025-06-03
Attending: STUDENT IN AN ORGANIZED HEALTH CARE EDUCATION/TRAINING PROGRAM
Payer: COMMERCIAL

## 2025-06-03 DIAGNOSIS — F25.0 SCHIZOAFFECTIVE DISORDER, BIPOLAR TYPE WITH GOOD PROGNOSTIC FEATURES (HCC): Primary | ICD-10-CM

## 2025-06-03 PROCEDURE — 96372 THER/PROPH/DIAG INJ SC/IM: CPT

## 2025-06-03 RX ADMIN — PALIPERIDONE PALMITATE 156 MG: 156 INJECTION INTRAMUSCULAR at 13:24

## 2025-06-03 NOTE — PROGRESS NOTES
Patient tolerated RIGHT deltoid Invega injection without complications. AVS declined. Next appt confirmed July 2, at 1:00 pm. Patient left unit ambulatory with a steady gait.

## 2025-07-02 ENCOUNTER — HOSPITAL ENCOUNTER (OUTPATIENT)
Dept: INFUSION CENTER | Facility: HOSPITAL | Age: 50
Discharge: HOME | End: 2025-07-02
Attending: STUDENT IN AN ORGANIZED HEALTH CARE EDUCATION/TRAINING PROGRAM
Payer: COMMERCIAL

## 2025-07-02 DIAGNOSIS — F25.0 SCHIZOAFFECTIVE DISORDER, BIPOLAR TYPE WITH GOOD PROGNOSTIC FEATURES (HCC): Primary | ICD-10-CM

## 2025-07-02 PROCEDURE — 96372 THER/PROPH/DIAG INJ SC/IM: CPT

## 2025-07-02 RX ADMIN — PALIPERIDONE PALMITATE 156 MG: 156 INJECTION INTRAMUSCULAR at 12:44

## 2025-07-02 NOTE — PROGRESS NOTES
Patient tolerated LEFT deltoid Invega injection without complications. AVS declined. Next appt confirmed for 07/30/2025 at 12:30pm. Left unit ambulatory with a steady gait.